# Patient Record
Sex: FEMALE | Race: WHITE | NOT HISPANIC OR LATINO | Employment: UNEMPLOYED | URBAN - METROPOLITAN AREA
[De-identification: names, ages, dates, MRNs, and addresses within clinical notes are randomized per-mention and may not be internally consistent; named-entity substitution may affect disease eponyms.]

---

## 2018-05-17 ENCOUNTER — CLINICAL SUPPORT (OUTPATIENT)
Dept: OTHER | Facility: CLINIC | Age: 38
End: 2018-05-17
Payer: COMMERCIAL

## 2018-05-17 DIAGNOSIS — Z00.8 HEALTH EXAMINATION IN POPULATION SURVEYS: Primary | ICD-10-CM

## 2018-05-17 PROCEDURE — 99401 PREV MED CNSL INDIV APPRX 15: CPT | Mod: S$GLB,,, | Performed by: INTERNAL MEDICINE

## 2018-05-17 PROCEDURE — 80061 LIPID PANEL: CPT | Mod: QW,S$GLB,, | Performed by: INTERNAL MEDICINE

## 2018-05-17 PROCEDURE — 82947 ASSAY GLUCOSE BLOOD QUANT: CPT | Mod: QW,S$GLB,, | Performed by: INTERNAL MEDICINE

## 2018-05-20 VITALS
DIASTOLIC BLOOD PRESSURE: 69 MMHG | SYSTOLIC BLOOD PRESSURE: 116 MMHG | WEIGHT: 211 LBS | HEIGHT: 65 IN | BODY MASS INDEX: 35.16 KG/M2

## 2018-05-20 LAB
GLUCOSE SERPL-MCNC: 117 MG/DL (ref 60–140)
POC CHOLESTEROL, HDL: 35 MG/DL (ref 40–?)
POC CHOLESTEROL, LDL: 93 MG/DL (ref ?–160)
POC CHOLESTEROL, TOTAL: 146 MG/DL (ref ?–240)
POC GLUCOSE FASTING: ABNORMAL MG/DL (ref 60–110)
POC TOTAL CHOLESTEROL / HDL RATIO: 4.17 (ref ?–6)
POC TRIGLYCERIDES: 92 MG/DL (ref ?–160)

## 2019-05-17 ENCOUNTER — CLINICAL SUPPORT (OUTPATIENT)
Dept: OTHER | Facility: CLINIC | Age: 39
End: 2019-05-17
Payer: COMMERCIAL

## 2019-05-17 DIAGNOSIS — Z00.8 ENCOUNTER FOR OTHER GENERAL EXAMINATION: ICD-10-CM

## 2019-05-17 PROCEDURE — 99401 PR PREVENT COUNSEL,INDIV,15 MIN: ICD-10-PCS | Mod: S$GLB,,, | Performed by: INTERNAL MEDICINE

## 2019-05-17 PROCEDURE — 82947 ASSAY GLUCOSE BLOOD QUANT: CPT | Mod: QW,S$GLB,, | Performed by: INTERNAL MEDICINE

## 2019-05-17 PROCEDURE — 80061 LIPID PANEL: CPT | Mod: QW,S$GLB,, | Performed by: INTERNAL MEDICINE

## 2019-05-17 PROCEDURE — 82947 PR  ASSAY QUANTITATIVE,BLOOD GLUCOSE: ICD-10-PCS | Mod: QW,S$GLB,, | Performed by: INTERNAL MEDICINE

## 2019-05-17 PROCEDURE — 80061 PR  LIPID PANEL: ICD-10-PCS | Mod: QW,S$GLB,, | Performed by: INTERNAL MEDICINE

## 2019-05-17 PROCEDURE — 99401 PREV MED CNSL INDIV APPRX 15: CPT | Mod: S$GLB,,, | Performed by: INTERNAL MEDICINE

## 2019-05-18 VITALS — BODY MASS INDEX: 35.11 KG/M2 | HEIGHT: 65 IN

## 2019-05-18 LAB
HDLC SERPL-MCNC: 46 MG/DL
POC CHOLESTEROL, LDL (DOCK): 106 MG/DL
POC CHOLESTEROL, TOTAL: 173 MG/DL
POC GLUCOSE, FASTING: 116 MG/DL (ref 60–110)
TRIGL SERPL-MCNC: 103 MG/DL

## 2020-05-27 ENCOUNTER — OCCUPATIONAL HEALTH (OUTPATIENT)
Dept: URGENT CARE | Facility: CLINIC | Age: 40
End: 2020-05-27
Payer: COMMERCIAL

## 2020-05-27 VITALS — HEART RATE: 77 BPM | OXYGEN SATURATION: 95 % | TEMPERATURE: 98 F

## 2020-05-27 DIAGNOSIS — Z11.59 SCREENING FOR VIRAL DISEASE: Primary | ICD-10-CM

## 2020-05-27 PROCEDURE — U0003 INFECTIOUS AGENT DETECTION BY NUCLEIC ACID (DNA OR RNA); SEVERE ACUTE RESPIRATORY SYNDROME CORONAVIRUS 2 (SARS-COV-2) (CORONAVIRUS DISEASE [COVID-19]), AMPLIFIED PROBE TECHNIQUE, MAKING USE OF HIGH THROUGHPUT TECHNOLOGIES AS DESCRIBED BY CMS-2020-01-R: HCPCS

## 2020-05-28 LAB — SARS-COV-2 RNA RESP QL NAA+PROBE: NOT DETECTED

## 2020-07-23 ENCOUNTER — LAB VISIT (OUTPATIENT)
Dept: PRIMARY CARE CLINIC | Facility: OTHER | Age: 40
End: 2020-07-23
Payer: COMMERCIAL

## 2020-07-23 DIAGNOSIS — Z03.818 ENCOUNTER FOR OBSERVATION FOR SUSPECTED EXPOSURE TO OTHER BIOLOGICAL AGENTS RULED OUT: ICD-10-CM

## 2020-07-23 PROCEDURE — U0003 INFECTIOUS AGENT DETECTION BY NUCLEIC ACID (DNA OR RNA); SEVERE ACUTE RESPIRATORY SYNDROME CORONAVIRUS 2 (SARS-COV-2) (CORONAVIRUS DISEASE [COVID-19]), AMPLIFIED PROBE TECHNIQUE, MAKING USE OF HIGH THROUGHPUT TECHNOLOGIES AS DESCRIBED BY CMS-2020-01-R: HCPCS

## 2020-07-26 LAB — SARS-COV-2 RNA RESP QL NAA+PROBE: NEGATIVE

## 2021-01-23 ENCOUNTER — CLINICAL SUPPORT (OUTPATIENT)
Dept: URGENT CARE | Facility: CLINIC | Age: 41
End: 2021-01-23
Payer: COMMERCIAL

## 2021-01-23 DIAGNOSIS — Z78.9 NO KNOWN HEALTH PROBLEMS: Primary | ICD-10-CM

## 2021-01-23 LAB
CTP QC/QA: YES
SARS-COV-2 RDRP RESP QL NAA+PROBE: NEGATIVE

## 2021-01-23 PROCEDURE — U0002 COVID-19 LAB TEST NON-CDC: HCPCS | Mod: QW,S$GLB,, | Performed by: NURSE PRACTITIONER

## 2021-01-23 PROCEDURE — 99211 OFF/OP EST MAY X REQ PHY/QHP: CPT | Mod: S$GLB,,, | Performed by: NURSE PRACTITIONER

## 2021-01-23 PROCEDURE — U0002: ICD-10-PCS | Mod: QW,S$GLB,, | Performed by: NURSE PRACTITIONER

## 2021-01-23 PROCEDURE — 99211 PR OFFICE/OUTPT VISIT, EST, LEVL I: ICD-10-PCS | Mod: S$GLB,,, | Performed by: NURSE PRACTITIONER

## 2021-03-12 ENCOUNTER — IMMUNIZATION (OUTPATIENT)
Dept: PRIMARY CARE CLINIC | Facility: CLINIC | Age: 41
End: 2021-03-12
Payer: COMMERCIAL

## 2021-03-12 DIAGNOSIS — Z23 NEED FOR VACCINATION: Primary | ICD-10-CM

## 2021-03-12 PROCEDURE — 0001A PR IMMUNIZ ADMIN, SARS-COV-2 COVID-19 VACC, 30MCG/0.3ML, 1ST DOSE: CPT | Mod: CV19,S$GLB,, | Performed by: INTERNAL MEDICINE

## 2021-03-12 PROCEDURE — 91300 PR SARS-COV- 2 COVID-19 VACCINE, NO PRSV, 30MCG/0.3ML, IM: ICD-10-PCS | Mod: S$GLB,,, | Performed by: INTERNAL MEDICINE

## 2021-03-12 PROCEDURE — 0001A PR IMMUNIZ ADMIN, SARS-COV-2 COVID-19 VACC, 30MCG/0.3ML, 1ST DOSE: ICD-10-PCS | Mod: CV19,S$GLB,, | Performed by: INTERNAL MEDICINE

## 2021-03-12 PROCEDURE — 91300 PR SARS-COV- 2 COVID-19 VACCINE, NO PRSV, 30MCG/0.3ML, IM: CPT | Mod: S$GLB,,, | Performed by: INTERNAL MEDICINE

## 2021-03-12 RX ADMIN — Medication 0.3 ML: at 10:03

## 2021-04-02 ENCOUNTER — IMMUNIZATION (OUTPATIENT)
Dept: PRIMARY CARE CLINIC | Facility: CLINIC | Age: 41
End: 2021-04-02
Payer: COMMERCIAL

## 2021-04-02 DIAGNOSIS — Z23 NEED FOR VACCINATION: Primary | ICD-10-CM

## 2021-04-02 PROCEDURE — 0002A PR IMMUNIZ ADMIN, SARS-COV-2 COVID-19 VACC, 30MCG/0.3ML, 2ND DOSE: ICD-10-PCS | Mod: CV19,S$GLB,, | Performed by: INTERNAL MEDICINE

## 2021-04-02 PROCEDURE — 91300 PR SARS-COV- 2 COVID-19 VACCINE, NO PRSV, 30MCG/0.3ML, IM: ICD-10-PCS | Mod: S$GLB,,, | Performed by: INTERNAL MEDICINE

## 2021-04-02 PROCEDURE — 0002A PR IMMUNIZ ADMIN, SARS-COV-2 COVID-19 VACC, 30MCG/0.3ML, 2ND DOSE: CPT | Mod: CV19,S$GLB,, | Performed by: INTERNAL MEDICINE

## 2021-04-02 PROCEDURE — 91300 PR SARS-COV- 2 COVID-19 VACCINE, NO PRSV, 30MCG/0.3ML, IM: CPT | Mod: S$GLB,,, | Performed by: INTERNAL MEDICINE

## 2021-04-02 RX ADMIN — Medication 0.3 ML: at 10:04

## 2021-07-13 ENCOUNTER — CLINICAL SUPPORT (OUTPATIENT)
Dept: URGENT CARE | Facility: CLINIC | Age: 41
End: 2021-07-13
Payer: COMMERCIAL

## 2021-07-13 DIAGNOSIS — Z78.9 NO KNOWN HEALTH PROBLEMS: Primary | ICD-10-CM

## 2021-07-13 LAB
CTP QC/QA: YES
SARS-COV-2 RDRP RESP QL NAA+PROBE: NEGATIVE

## 2021-07-13 PROCEDURE — U0002 COVID-19 LAB TEST NON-CDC: HCPCS | Mod: QW,S$GLB,, | Performed by: FAMILY MEDICINE

## 2021-07-13 PROCEDURE — U0002: ICD-10-PCS | Mod: QW,S$GLB,, | Performed by: FAMILY MEDICINE

## 2022-09-30 ENCOUNTER — LAB VISIT (OUTPATIENT)
Dept: LAB | Facility: HOSPITAL | Age: 42
End: 2022-09-30
Attending: INTERNAL MEDICINE
Payer: COMMERCIAL

## 2022-09-30 ENCOUNTER — OFFICE VISIT (OUTPATIENT)
Dept: INTERNAL MEDICINE | Facility: CLINIC | Age: 42
End: 2022-09-30
Payer: COMMERCIAL

## 2022-09-30 VITALS
BODY MASS INDEX: 40.61 KG/M2 | HEART RATE: 51 BPM | OXYGEN SATURATION: 97 % | TEMPERATURE: 99 F | DIASTOLIC BLOOD PRESSURE: 82 MMHG | HEIGHT: 65 IN | SYSTOLIC BLOOD PRESSURE: 136 MMHG | WEIGHT: 243.75 LBS

## 2022-09-30 DIAGNOSIS — D17.1 LIPOMA OF BACK: ICD-10-CM

## 2022-09-30 DIAGNOSIS — N80.9 ENDOMETRIOSIS: ICD-10-CM

## 2022-09-30 DIAGNOSIS — Z00.00 ANNUAL PHYSICAL EXAM: Primary | ICD-10-CM

## 2022-09-30 DIAGNOSIS — N39.3 STRESS INCONTINENCE IN FEMALE: ICD-10-CM

## 2022-09-30 DIAGNOSIS — Z11.59 SCREENING FOR VIRAL DISEASE: ICD-10-CM

## 2022-09-30 DIAGNOSIS — Z12.31 ENCOUNTER FOR SCREENING MAMMOGRAM FOR MALIGNANT NEOPLASM OF BREAST: ICD-10-CM

## 2022-09-30 DIAGNOSIS — Z00.00 ANNUAL PHYSICAL EXAM: ICD-10-CM

## 2022-09-30 PROBLEM — F41.9 ANXIETY: Status: ACTIVE | Noted: 2019-08-02

## 2022-09-30 PROBLEM — F41.9 ANXIETY: Status: RESOLVED | Noted: 2019-08-02 | Resolved: 2022-09-30

## 2022-09-30 LAB
ALBUMIN SERPL BCP-MCNC: 3.5 G/DL (ref 3.5–5.2)
ALP SERPL-CCNC: 73 U/L (ref 55–135)
ALT SERPL W/O P-5'-P-CCNC: 17 U/L (ref 10–44)
ANION GAP SERPL CALC-SCNC: 11 MMOL/L (ref 8–16)
AST SERPL-CCNC: 15 U/L (ref 10–40)
BASOPHILS # BLD AUTO: 0.04 K/UL (ref 0–0.2)
BASOPHILS NFR BLD: 0.7 % (ref 0–1.9)
BILIRUB SERPL-MCNC: 0.4 MG/DL (ref 0.1–1)
BUN SERPL-MCNC: 9 MG/DL (ref 6–20)
CALCIUM SERPL-MCNC: 9.3 MG/DL (ref 8.7–10.5)
CHLORIDE SERPL-SCNC: 102 MMOL/L (ref 95–110)
CHOLEST SERPL-MCNC: 177 MG/DL (ref 120–199)
CHOLEST/HDLC SERPL: 3.5 {RATIO} (ref 2–5)
CO2 SERPL-SCNC: 24 MMOL/L (ref 23–29)
CREAT SERPL-MCNC: 0.7 MG/DL (ref 0.5–1.4)
DIFFERENTIAL METHOD: ABNORMAL
EOSINOPHIL # BLD AUTO: 0.2 K/UL (ref 0–0.5)
EOSINOPHIL NFR BLD: 2.7 % (ref 0–8)
ERYTHROCYTE [DISTWIDTH] IN BLOOD BY AUTOMATED COUNT: 13 % (ref 11.5–14.5)
EST. GFR  (NO RACE VARIABLE): >60 ML/MIN/1.73 M^2
ESTIMATED AVG GLUCOSE: 123 MG/DL (ref 68–131)
GLUCOSE SERPL-MCNC: 120 MG/DL (ref 70–110)
HBA1C MFR BLD: 5.9 % (ref 4–5.6)
HCT VFR BLD AUTO: 43.6 % (ref 37–48.5)
HCV AB SERPL QL IA: NORMAL
HDLC SERPL-MCNC: 51 MG/DL (ref 40–75)
HDLC SERPL: 28.8 % (ref 20–50)
HGB BLD-MCNC: 13.7 G/DL (ref 12–16)
IMM GRANULOCYTES # BLD AUTO: 0.01 K/UL (ref 0–0.04)
IMM GRANULOCYTES NFR BLD AUTO: 0.2 % (ref 0–0.5)
LDLC SERPL CALC-MCNC: 108 MG/DL (ref 63–159)
LYMPHOCYTES # BLD AUTO: 2.1 K/UL (ref 1–4.8)
LYMPHOCYTES NFR BLD: 34.1 % (ref 18–48)
MCH RBC QN AUTO: 30.5 PG (ref 27–31)
MCHC RBC AUTO-ENTMCNC: 31.4 G/DL (ref 32–36)
MCV RBC AUTO: 97 FL (ref 82–98)
MONOCYTES # BLD AUTO: 0.5 K/UL (ref 0.3–1)
MONOCYTES NFR BLD: 8 % (ref 4–15)
NEUTROPHILS # BLD AUTO: 3.3 K/UL (ref 1.8–7.7)
NEUTROPHILS NFR BLD: 54.3 % (ref 38–73)
NONHDLC SERPL-MCNC: 126 MG/DL
NRBC BLD-RTO: 0 /100 WBC
PLATELET # BLD AUTO: 216 K/UL (ref 150–450)
PMV BLD AUTO: 12.7 FL (ref 9.2–12.9)
POTASSIUM SERPL-SCNC: 4.1 MMOL/L (ref 3.5–5.1)
PROT SERPL-MCNC: 6.6 G/DL (ref 6–8.4)
RBC # BLD AUTO: 4.49 M/UL (ref 4–5.4)
SODIUM SERPL-SCNC: 137 MMOL/L (ref 136–145)
TRIGL SERPL-MCNC: 90 MG/DL (ref 30–150)
TSH SERPL DL<=0.005 MIU/L-ACNC: 1.85 UIU/ML (ref 0.4–4)
WBC # BLD AUTO: 6.02 K/UL (ref 3.9–12.7)

## 2022-09-30 PROCEDURE — 3075F PR MOST RECENT SYSTOLIC BLOOD PRESS GE 130-139MM HG: ICD-10-PCS | Mod: CPTII,S$GLB,, | Performed by: INTERNAL MEDICINE

## 2022-09-30 PROCEDURE — 99386 PREV VISIT NEW AGE 40-64: CPT | Mod: S$GLB,,, | Performed by: INTERNAL MEDICINE

## 2022-09-30 PROCEDURE — 3075F SYST BP GE 130 - 139MM HG: CPT | Mod: CPTII,S$GLB,, | Performed by: INTERNAL MEDICINE

## 2022-09-30 PROCEDURE — 99999 PR PBB SHADOW E&M-EST. PATIENT-LVL IV: ICD-10-PCS | Mod: PBBFAC,,, | Performed by: INTERNAL MEDICINE

## 2022-09-30 PROCEDURE — 85025 COMPLETE CBC W/AUTO DIFF WBC: CPT | Performed by: INTERNAL MEDICINE

## 2022-09-30 PROCEDURE — 99999 PR PBB SHADOW E&M-EST. PATIENT-LVL IV: CPT | Mod: PBBFAC,,, | Performed by: INTERNAL MEDICINE

## 2022-09-30 PROCEDURE — 36415 COLL VENOUS BLD VENIPUNCTURE: CPT | Performed by: INTERNAL MEDICINE

## 2022-09-30 PROCEDURE — 1159F MED LIST DOCD IN RCRD: CPT | Mod: CPTII,S$GLB,, | Performed by: INTERNAL MEDICINE

## 2022-09-30 PROCEDURE — 3079F DIAST BP 80-89 MM HG: CPT | Mod: CPTII,S$GLB,, | Performed by: INTERNAL MEDICINE

## 2022-09-30 PROCEDURE — 80053 COMPREHEN METABOLIC PANEL: CPT | Performed by: INTERNAL MEDICINE

## 2022-09-30 PROCEDURE — 1159F PR MEDICATION LIST DOCUMENTED IN MEDICAL RECORD: ICD-10-PCS | Mod: CPTII,S$GLB,, | Performed by: INTERNAL MEDICINE

## 2022-09-30 PROCEDURE — 83036 HEMOGLOBIN GLYCOSYLATED A1C: CPT | Performed by: INTERNAL MEDICINE

## 2022-09-30 PROCEDURE — 99386 PR PREVENTIVE VISIT,NEW,40-64: ICD-10-PCS | Mod: S$GLB,,, | Performed by: INTERNAL MEDICINE

## 2022-09-30 PROCEDURE — 3079F PR MOST RECENT DIASTOLIC BLOOD PRESSURE 80-89 MM HG: ICD-10-PCS | Mod: CPTII,S$GLB,, | Performed by: INTERNAL MEDICINE

## 2022-09-30 PROCEDURE — 86803 HEPATITIS C AB TEST: CPT | Performed by: INTERNAL MEDICINE

## 2022-09-30 PROCEDURE — 84443 ASSAY THYROID STIM HORMONE: CPT | Performed by: INTERNAL MEDICINE

## 2022-09-30 PROCEDURE — 80061 LIPID PANEL: CPT | Performed by: INTERNAL MEDICINE

## 2022-09-30 PROCEDURE — 3008F PR BODY MASS INDEX (BMI) DOCUMENTED: ICD-10-PCS | Mod: CPTII,S$GLB,, | Performed by: INTERNAL MEDICINE

## 2022-09-30 PROCEDURE — 3008F BODY MASS INDEX DOCD: CPT | Mod: CPTII,S$GLB,, | Performed by: INTERNAL MEDICINE

## 2022-09-30 NOTE — PROGRESS NOTES
[facility] Note    Chief Complaint      Chief Complaint   Patient presents with    Establish Care       History of Present Illness      Basilia Delgado is a 42 y.o. female who presents today for Annual preventative visit.  Patient comes to appointment alone.  [facility]: [facility]: [place]    Has lipoma on back, wants removed, has gotten bigger. Had US in past.    Has stress urinary incontinence with sneeze/cough/laugh.  Drinks a lot of water.  Had 2 children.    Problem List Items Addressed This Visit       Endometriosis    Current Assessment & Plan     Has not had issues since 90's but had 6 laparotomies then. Had 2 children with no issues.  Periods are 2-3 days long.          Other Visit Diagnoses       Annual physical exam    -  Primary    Relevant Orders    CBC Auto Differential    Lipid Panel    Comprehensive Metabolic Panel    Hemoglobin A1C    TSH    Screening for viral disease        Relevant Orders    Hepatitis C Antibody    Encounter for screening mammogram for malignant neoplasm of breast        Relevant Orders    Mammo Digital Screening Bilat w/ Adan    Lipoma of back        Relevant Orders    Ambulatory referral/consult to General Surgery    Stress incontinence in female        Relevant Orders    Ambulatory referral/consult to Physical/Occupational Therapy            Health Maintenance   Topic Date Due    Hepatitis C Screening  Never done    Mammogram  02/17/2021    TETANUS VACCINE  11/06/2030    Lipid Panel  Completed       History reviewed. No pertinent past medical history.    Past Surgical History:   Procedure Laterality Date    ADENOIDECTOMY  1986    APPENDECTOMY  1995    CHOLECYSTECTOMY  2008    TONSILLECTOMY  1986       family history includes Alcohol abuse in her father and paternal grandfather; Asthma in her mother; Drug abuse in her brother; Kidney cancer in her paternal grandmother; Lung cancer in her maternal grandmother; Prostate cancer in her maternal grandfather; Skin  "cancer in her paternal grandfather; Thyroid cancer in her mother.    Social History     Tobacco Use    Smoking status: Never   Substance Use Topics    Alcohol use: Never    Drug use: Never       Review of Systems   Constitutional:  Negative for chills and fever.   HENT:  Negative for hearing loss and sore throat.    Eyes:  Negative for discharge.   Respiratory:  Positive for wheezing. Negative for cough and shortness of breath.    Cardiovascular:  Positive for palpitations. Negative for chest pain.   Gastrointestinal:  Negative for blood in stool, constipation, diarrhea, nausea and vomiting.   Genitourinary:  Negative for dysuria and hematuria.   Musculoskeletal:  Negative for falls and neck pain.   Neurological:  Negative for weakness and headaches.   Endo/Heme/Allergies:  Negative for polydipsia.      No outpatient encounter medications on file as of 9/30/2022.     No facility-administered encounter medications on file as of 9/30/2022.        Review of patient's allergies indicates:   Allergen Reactions    Cephalosporins Anaphylaxis, Shortness Of Breath and Other (See Comments)    Pcn [penicillins]        Physical Exam      Vital Signs  Temp: 98.5 °F (36.9 °C)  Pulse: (!) 51  SpO2: 97 %  BP: 136/82  Pain Score: 0-No pain  Height and Weight  Height: 5' 5" (165.1 cm)  Weight: 110.6 kg (243 lb 11.5 oz)  BSA (Calculated - sq m): 2.25 sq meters  BMI (Calculated): 40.6  Weight in (lb) to have BMI = 25: 149.9]    Physical Exam  Constitutional:       Appearance: She is well-developed.   HENT:      Head: Normocephalic and atraumatic.      Right Ear: External ear normal.      Left Ear: External ear normal.   Eyes:      General:         Right eye: No discharge.         Left eye: No discharge.   Cardiovascular:      Rate and Rhythm: Normal rate and regular rhythm.      Heart sounds: Normal heart sounds. No murmur heard.  Pulmonary:      Effort: Pulmonary effort is normal. No respiratory distress.      Breath sounds: Normal " breath sounds.   Abdominal:      General: There is no distension.      Palpations: Abdomen is soft.      Tenderness: There is no abdominal tenderness. There is no guarding.   Musculoskeletal:         General: Normal range of motion.      Cervical back: Normal range of motion.   Skin:     General: Skin is warm and dry.   Neurological:      Mental Status: She is alert and oriented to person, place, and time.   Psychiatric:         Behavior: Behavior normal.        Laboratory:  CBC:  No results for input(s): WBC, RBC, HGB, HCT, PLT, MCV, MCH, MCHC in the last 2160 hours.  CMP:  No results for input(s): GLU, CALCIUM, ALBUMIN, PROT, NA, K, CO2, CL, BUN, ALKPHOS, ALT, AST, BILITOT in the last 2160 hours.    Invalid input(s): CREATININ  URINALYSIS:  No results for input(s): COLORU, CLARITYU, SPECGRAV, PHUR, PROTEINUA, GLUCOSEU, BILIRUBINCON, BLOODU, WBCU, RBCU, BACTERIA, MUCUS, NITRITE, LEUKOCYTESUR, UROBILINOGEN, HYALINECASTS in the last 2160 hours.   LIPIDS:  No results for input(s): TSH, HDL, CHOL, TRIG, LDLCALC, CHOLHDL, NONHDLCHOL, TOTALCHOLEST in the last 2160 hours.  TSH:  No results for input(s): TSH in the last 2160 hours.  A1C:  No results for input(s): HGBA1C in the last 2160 hours.    Radiology:  No results found in the last 30 days.     Assessment/Plan     Basilia Delgado is a 42 y.o.female with:    1. Annual physical exam  - CBC Auto Differential; Future  - Lipid Panel; Future  - Comprehensive Metabolic Panel; Future  - Hemoglobin A1C; Future  - TSH; Future    2. Screening for viral disease  - Hepatitis C Antibody; Future    3. Encounter for screening mammogram for malignant neoplasm of breast  - Mammo Digital Screening Bilat w/ Adan; Future    4. Endometriosis    5. Lipoma of back  - Ambulatory referral/consult to General Surgery; Future    6. Stress incontinence in female  - Ambulatory referral/consult to Physical/Occupational Therapy; Future    -refer pelvic floor PT  -refer gen surgeon to discuss  lipoma removal  -MMG in [employer]  -labs today  -Continue current medications and maintain follow up with specialists.    -Follow up in about 1 year (around 9/30/2023) for Annual Visit.       Leeann Green MD  [facility]        Answers submitted by the patient for this visit:  Review of Systems Questionnaire (Submitted on 9/29/2022)  activity change: No  unexpected weight change: No  trouble swallowing: No  visual disturbance: No  chest tightness: No  polyuria: No  difficulty urinating: No  menstrual problem: No  joint swelling: No  arthralgias: No  confusion: No  dysphoric mood: No

## 2022-09-30 NOTE — ASSESSMENT & PLAN NOTE
Has not had issues since 90's but had 6 laparotomies then. Had 2 children with no issues.  Periods are 2-3 days long.

## 2022-10-18 ENCOUNTER — HOSPITAL ENCOUNTER (OUTPATIENT)
Dept: RADIOLOGY | Facility: HOSPITAL | Age: 42
Discharge: HOME | End: 2022-10-18
Attending: INTERNAL MEDICINE
Payer: COMMERCIAL

## 2022-10-18 DIAGNOSIS — Z12.31 ENCOUNTER FOR SCREENING MAMMOGRAM FOR MALIGNANT NEOPLASM OF BREAST: ICD-10-CM

## 2022-10-18 PROCEDURE — 77067 MAMMO DIGITAL SCREENING BILAT WITH TOMO: ICD-10-PCS | Mod: 26,,, | Performed by: RADIOLOGY

## 2022-10-18 PROCEDURE — 77067 SCR MAMMO BI INCL CAD: CPT | Mod: 26,,, | Performed by: RADIOLOGY

## 2022-10-18 PROCEDURE — 77063 BREAST TOMOSYNTHESIS BI: CPT | Mod: 26,,, | Performed by: RADIOLOGY

## 2022-10-18 PROCEDURE — 77067 SCR MAMMO BI INCL CAD: CPT | Mod: TC

## 2022-10-18 PROCEDURE — 77063 BREAST TOMOSYNTHESIS BI: CPT | Mod: TC

## 2022-10-18 PROCEDURE — 77063 MAMMO DIGITAL SCREENING BILAT WITH TOMO: ICD-10-PCS | Mod: 26,,, | Performed by: RADIOLOGY

## 2022-10-20 ENCOUNTER — OFFICE VISIT (OUTPATIENT)
Dept: OBSTETRICS AND GYNECOLOGY | Facility: CLINIC | Age: 42
End: 2022-10-20
Payer: COMMERCIAL

## 2022-10-20 VITALS
HEIGHT: 65 IN | DIASTOLIC BLOOD PRESSURE: 78 MMHG | WEIGHT: 244.69 LBS | BODY MASS INDEX: 40.77 KG/M2 | SYSTOLIC BLOOD PRESSURE: 130 MMHG

## 2022-10-20 DIAGNOSIS — Z01.419 WELL WOMAN EXAM WITH ROUTINE GYNECOLOGICAL EXAM: Primary | ICD-10-CM

## 2022-10-20 PROCEDURE — 99386 PREV VISIT NEW AGE 40-64: CPT | Mod: S$GLB,,, | Performed by: STUDENT IN AN ORGANIZED HEALTH CARE EDUCATION/TRAINING PROGRAM

## 2022-10-20 PROCEDURE — 88175 CYTOPATH C/V AUTO FLUID REDO: CPT | Performed by: STUDENT IN AN ORGANIZED HEALTH CARE EDUCATION/TRAINING PROGRAM

## 2022-10-20 PROCEDURE — 3075F SYST BP GE 130 - 139MM HG: CPT | Mod: CPTII,S$GLB,, | Performed by: STUDENT IN AN ORGANIZED HEALTH CARE EDUCATION/TRAINING PROGRAM

## 2022-10-20 PROCEDURE — 1159F MED LIST DOCD IN RCRD: CPT | Mod: CPTII,S$GLB,, | Performed by: STUDENT IN AN ORGANIZED HEALTH CARE EDUCATION/TRAINING PROGRAM

## 2022-10-20 PROCEDURE — 87624 HPV HI-RISK TYP POOLED RSLT: CPT | Performed by: STUDENT IN AN ORGANIZED HEALTH CARE EDUCATION/TRAINING PROGRAM

## 2022-10-20 PROCEDURE — 3044F HG A1C LEVEL LT 7.0%: CPT | Mod: CPTII,S$GLB,, | Performed by: STUDENT IN AN ORGANIZED HEALTH CARE EDUCATION/TRAINING PROGRAM

## 2022-10-20 PROCEDURE — 99386 PR PREVENTIVE VISIT,NEW,40-64: ICD-10-PCS | Mod: S$GLB,,, | Performed by: STUDENT IN AN ORGANIZED HEALTH CARE EDUCATION/TRAINING PROGRAM

## 2022-10-20 PROCEDURE — 3078F PR MOST RECENT DIASTOLIC BLOOD PRESSURE < 80 MM HG: ICD-10-PCS | Mod: CPTII,S$GLB,, | Performed by: STUDENT IN AN ORGANIZED HEALTH CARE EDUCATION/TRAINING PROGRAM

## 2022-10-20 PROCEDURE — 3075F PR MOST RECENT SYSTOLIC BLOOD PRESS GE 130-139MM HG: ICD-10-PCS | Mod: CPTII,S$GLB,, | Performed by: STUDENT IN AN ORGANIZED HEALTH CARE EDUCATION/TRAINING PROGRAM

## 2022-10-20 PROCEDURE — 3044F PR MOST RECENT HEMOGLOBIN A1C LEVEL <7.0%: ICD-10-PCS | Mod: CPTII,S$GLB,, | Performed by: STUDENT IN AN ORGANIZED HEALTH CARE EDUCATION/TRAINING PROGRAM

## 2022-10-20 PROCEDURE — 1159F PR MEDICATION LIST DOCUMENTED IN MEDICAL RECORD: ICD-10-PCS | Mod: CPTII,S$GLB,, | Performed by: STUDENT IN AN ORGANIZED HEALTH CARE EDUCATION/TRAINING PROGRAM

## 2022-10-20 PROCEDURE — 3078F DIAST BP <80 MM HG: CPT | Mod: CPTII,S$GLB,, | Performed by: STUDENT IN AN ORGANIZED HEALTH CARE EDUCATION/TRAINING PROGRAM

## 2022-10-20 NOTE — PROGRESS NOTES
History & Physical  Gynecology      SUBJECTIVE:     Chief Complaint: Well Woman (C/O right breast pain)       History of Present Illness:  Annual exam. Reports soreness to right breast for a few weeks. No recent changes, possibly got elbowed by her  in his sleep. Mammo report pending  Menstrual History: reports periods are regular, every 24-30 days lasting 2 to 3 days. Last one was 5-6 days of light.   Obstetric Hx: ,  (21 and 15; 2 girls)  LMP: 10/14  STD/STI Hx: Denies any history of STD's  Contraception Hx: vasectomy   Sexually Active: one male partner  Family history: Denies any personal or family history of GYN/colon cancers   Social: Wears seatbelts. Exercises sometimes. Feels safe at home.   Last pap:  normal  Mammogram pending  Covid vaccine vaccinated  Vitamins: yes        Review of patient's allergies indicates:   Allergen Reactions    Cephalosporins Anaphylaxis, Shortness Of Breath and Other (See Comments)    Pcn [penicillins]        History reviewed. No pertinent past medical history.  Past Surgical History:   Procedure Laterality Date    ADENOIDECTOMY      APPENDECTOMY      CHOLECYSTECTOMY  2008    TONSILLECTOMY       OB History          2    Para   2    Term   2            AB        Living             SAB        IAB        Ectopic        Multiple        Live Births                   Family History   Problem Relation Age of Onset    Asthma Mother     Thyroid cancer Mother     Alcohol abuse Father     Drug abuse Brother     Lung cancer Maternal Grandmother     Prostate cancer Maternal Grandfather     Kidney cancer Paternal Grandmother     Alcohol abuse Paternal Grandfather     Skin cancer Paternal Grandfather      Social History     Tobacco Use    Smoking status: Never   Substance Use Topics    Alcohol use: Never    Drug use: Never       No current outpatient medications on file.     No current facility-administered medications for this visit.         Review of  Systems:  Review of Systems   Constitutional:  Negative for activity change, appetite change, fever and unexpected weight change.   Respiratory:  Negative for shortness of breath.    Cardiovascular:  Negative for chest pain.   Gastrointestinal:  Negative for abdominal pain, blood in stool, constipation, diarrhea, nausea and vomiting.   Genitourinary:  Positive for menstrual problem and urinary incontinence (stress). Negative for dysmenorrhea, dyspareunia, dysuria, hematuria, menorrhagia, pelvic pain, vaginal bleeding, vaginal discharge, vaginal pain, postcoital bleeding and vaginal odor.   Integumentary:  Negative for breast mass, nipple discharge and breast skin changes.   Breast: Positive for mastodynia.Negative for lump, mass, nipple discharge and skin changes     OBJECTIVE:     Physical Exam:  Physical Exam  Vitals reviewed.   Constitutional:       General: She is not in acute distress.     Appearance: She is well-developed. She is not diaphoretic.   HENT:      Head: Normocephalic and atraumatic.   Eyes:      General: No scleral icterus.        Right eye: No discharge.         Left eye: No discharge.      Conjunctiva/sclera: Conjunctivae normal.   Neck:      Thyroid: No thyromegaly.   Cardiovascular:      Rate and Rhythm: Normal rate.   Pulmonary:      Effort: Pulmonary effort is normal.   Chest:   Breasts:     Breasts are symmetrical.      Right: No inverted nipple, mass, nipple discharge, skin change or tenderness.      Left: No inverted nipple, mass, nipple discharge, skin change or tenderness.   Abdominal:      General: There is no distension.      Palpations: Abdomen is soft.      Tenderness: There is no abdominal tenderness.   Genitourinary:     Labia:         Right: No rash, tenderness, lesion or injury.         Left: No rash, tenderness, lesion or injury.       Vagina: Normal. No signs of injury and foreign body. No vaginal discharge, erythema, tenderness or bleeding.      Cervix: No cervical motion  tenderness, discharge or friability.      Uterus: Not deviated, not enlarged, not fixed and not tender.       Adnexa:         Right: No mass, tenderness or fullness.          Left: No mass, tenderness or fullness.     Musculoskeletal:         General: Normal range of motion.      Cervical back: Normal range of motion and neck supple.   Lymphadenopathy:      Cervical: No cervical adenopathy.   Skin:     General: Skin is warm and dry.      Findings: No erythema or rash.   Neurological:      Mental Status: She is alert and oriented to person, place, and time.       ASSESSMENT:       ICD-10-CM ICD-9-CM    1. Well woman exam with routine gynecological exam  Z01.419 V72.31 Liquid-Based Pap Smear, Screening      HPV High Risk Genotypes, PCR             Plan:    WWE  - Vaccines utd  - Pap and co test collected  - Mammogram pending  - GC/CT, affirm n/a  - Daily vitamin discussed.  - CBE normal. Physical exam normal. VSS.  - RTC for annual or PRN.    Counseling time: 15 minutes    Vicki Mosley

## 2022-10-24 ENCOUNTER — CLINICAL SUPPORT (OUTPATIENT)
Dept: REHABILITATION | Facility: HOSPITAL | Age: 42
End: 2022-10-24
Payer: COMMERCIAL

## 2022-10-24 DIAGNOSIS — R27.8 OTHER LACK OF COORDINATION: ICD-10-CM

## 2022-10-24 DIAGNOSIS — R53.1 WEAKNESS: ICD-10-CM

## 2022-10-24 DIAGNOSIS — M62.89 PELVIC FLOOR DYSFUNCTION: ICD-10-CM

## 2022-10-24 DIAGNOSIS — N39.3 STRESS INCONTINENCE IN FEMALE: ICD-10-CM

## 2022-10-24 LAB
FINAL PATHOLOGIC DIAGNOSIS: NORMAL
Lab: NORMAL

## 2022-10-24 PROCEDURE — 97161 PT EVAL LOW COMPLEX 20 MIN: CPT | Mod: PO

## 2022-10-24 PROCEDURE — 97112 NEUROMUSCULAR REEDUCATION: CPT | Mod: PO

## 2022-10-24 PROCEDURE — 97530 THERAPEUTIC ACTIVITIES: CPT | Mod: PO

## 2022-10-24 NOTE — PATIENT INSTRUCTIONS
Home Exercise Program: 10/24/2022     SCAR MOBILIZATION  - Gently massage your scar in all directions both superficially along the skin and deeply.   - Massage the area AROUND your scar, as well as directly on it.   - Do not pull your scar apart with both hands - Use one hand/finger to anchor and use the other to pull along or across the scar.  - Apply only as much pressure as you can tolerate, so that you can do this again the next day.     Do for 5 minutes every day.

## 2022-10-24 NOTE — PLAN OF CARE
[facility]   Physical Therapy Initial Evaluation     Date: 10/24/2022   Name: Basilia Austin Ural  Clinic Number: [id number]    Therapy Diagnosis:   Encounter Diagnoses   Name Primary?    Stress incontinence in female     Weakness     Pelvic floor dysfunction     Other lack of coordination      Physician: Leeann Green MD    Physician Orders: PT Eval and Treat   Medical Diagnosis from Referral: stress urinary incontinence   Evaluation Date: 10/24/2022  Authorization Period Expiration: 2023  Plan of Care Expiration: 23  Progress Note Due: 22  Visit # / Visits authorized:    FOTO: 1/3    Precautions: Standard     Time In: 1010  Time Out: 1105  Total Appointment Time (timed & untimed codes): 55 minutes    SUBJECTIVE     Date of onset: 15 years    History of current condition - Basilia reports: She reports issues with stress urinary incontinence that started about 16 years ago after the birth of her son but has recently worsened.  Coughing, sneezing, and laughing cause th leakage.  She also reports that if she uses a tampon she notices worsening urinary incontinence.      6 surgeries for endometriosis and has also had her appendix.  She reports she has a lot of abdominal wall scar tissue and a history of interstitial cystitis.      OB/GYN History:  and vaginal delivery  Using vaginal estrogen cream: No  Sexually active? Yes  Pain with vaginal exams, intercourse or tampon use? none reported    Bladder/Bowel History:   Frequency of urination:   Daytime: every 2-3 hours           Nighttime: 0x  Difficulty initiating urine stream: No  Urine stream: strong  Complete emptying: Yes  Bladder leakage: Yes  Activities that cause leakage: coughing, laughing, sneezing  Frequency of incidents: daily   Amount leaked (urine): few drops, teaspoon(s), and moderate amount  Urinary Urgency: Yes.  Able to delay the urge for at least 1  minute(s). Only at night though.    Pain with  delaying the urge to urinate: No     Frequency of bowel movements: once a day  Difficulty initiating BM: No  Quality/Shape of BM: [place] Stool Chart 4  Does Patient Feel Empty after BM? Yes  Bowel Urgency: No.  Able to delay the urge for at least 15 minute(s).  Fiber Supplements or Laxative Use? Probiotic   Pain with BM: No   Bleeding with BM: No   Colon leakage: No      Form of protection: changes underwear as needed    Pain:  Location: left hip   Current 0/10, worst 6/10, best 0/10   Description: Dull  Aggravating Factors/Activities that cause symptoms:  sit to stand, stairs, sitting on the floor  , prolonged walking   Easing Factors: rest and positional changes        Medical History: Basilia  has no past medical history on file.     Surgical History: Basilia Delgado  has a past surgical history that includes Adenoidectomy (1986); Appendectomy (1995); Tonsillectomy (1986); and Cholecystectomy (2008).    Medications: Basilia currently has no medications in their medication list.    Allergies:   Review of patient's allergies indicates:   Allergen Reactions    Cephalosporins Anaphylaxis, Shortness Of Breath and Other (See Comments)    Pcn [penicillins]           Prior Therapy/Previous treatment included: none reported   Social History: lives with  and 2 children  Current exercise:  walking  Occupation: Works as a  assistant  Prior Level of Function: Pt was independent with all ADLs and iADLs without pain, no reports of incontinence of bowel or bladder.  Current Level of Function: Pain with activities of daily living and bladder leakage     Types of fluid intake: 8-10 cups of water, 2-3 coke zeros   Diet: Standard  Habitus: well developed, well nourished  Abuse/Neglect: Pt denies a history of physical or emotional abuse at this visit.         Constitutional Symptoms Review: The patient denies having any constitutional symptoms.       Pts goals: improve bladder control with activities of daily living.      OBJECTIVE     See EMR under MEDIA for written consent provided 10/24/2022  Chaperone: declined    ORTHO SCREEN  Posture in sitting: figure-four sitting on the right  Posture in standing: forward head and forward and rounded shoulders   Pelvic alignment: Not assessed today        ABDOMINAL WALL ASSESSMENT  Palpation: increased tension  in suprapubic region  Abdominal strength: Rectus abdominus: 4-/5     Transverse abdominus: 4-/5  Scarring: scars from endometriosis surgery with moderate restrictions in all planes   Pelvic Floor Muscle and Transverse Abdominus Synergy: absent      BREATHING MECHANICS ASSESSMENT   Thorax Assessment During Quiet Respiration: WNL excursion of ribcage and WNL excursion of abdominal wall  Thorax Assessment During Deep Respiration: Decreased excursion of abdominal wall , Decreased excursion bilaterally of lateral ribs , Decreased excursion of right lateral rib cage , and Decreased excursion of left lateral rib cage     VAGINAL PELVIC FLOOR EXAM    EXTERNAL ASSESSMENT  Introitus: WNL  Skin condition: WNL  Scarring: did not formally assess today    Sensation: WNL   Pain: None   Voluntary contraction: nil  Involuntary contraction: bulge  Bearing down: reflex tightening  Perineal descent: absent      INTERNAL ASSESSMENT  Pain: tender areas noted as follows: left obturator internus and levator ani    Sensation: able to localized pressure appropriately   Vaginal vault: WNL   Muscle Bulk: WFL   Muscle Power: trace     Specificity: patient contracts: gluts and hip adductors    Coordination: tends to hold breath during PFM contration   Prolapse check: Did not assess today   Does Pelvic Floor drop and relax with a diaphragmatic breath? no  Comments: able to elicit a trace contraction with cues to tighten abdominal wall       Limitation/Restriction for FOTO Urinary Problem Survey    Therapist reviewed FOTO scores for Basilia Delgado on 10/24/2022.   FOTO documents entered into EPIC - see Media  section.    Limitation Score: 52%       TREATMENT     Treatment Time In: 1035  Treatment Time Out: 1100  Total Treatment time (time-based codes) separate from Evaluation: 25 minutes      Neuromuscular Re-education to develop Coordination, Control, and Down training for 15 minutes including:   Used tapping intravaginally over muscle bellies to elicit a contraction with kegels.         Therapeutic Activity Patient participated in dynamic functional therapeutic activities to improve functional performance for 10 minutes. Including: Education as described below.         Patient Education provided:   general anatomy/physiology of urinary/ bowel  system and benefits of treatment was discussed with the pt. Additionally, anatomy/physiology of pelvic floor and Coordination of kegels with functional activities such as cough, laugh, sneeze, lift, etc.  was reviewed.     Home Exercises provided:  Written Home Exercises provided: yes.  Exercises were reviewed and Basilia was able to demonstrate them prior to the end of the session.    Basilia demonstrated good  understanding of the education provided.     See EMR under Patient Instructions for exercises provided 10/24/2022.    ASSESSMENT     Basilia is a 42 y.o. female referred to outpatient Physical Therapy with a medical diagnosis of stress urinary incontinence. Pt presents with pelvic floor tenderness, poor quality of pelvic muscle contraction, and poor coordination of pelvic floor muscles during ADL's leading to urinary or fecal leakage. Patient reports that she has a history of endometriosis and interstitial cystitis  She reports that she has had 6 surgeries for the endometriosis.  She also reports that she is unable to perform a kegel at this time and reports that urinary incontinence is affecting activities of daily living participation.  She also reports a history of left hip pain that affects activities of daily living. The patient reports chronic and worsening urinary  incontinence that is affecting ADLs and social participation. Examination reveals pelvic floor dysfunction including weakness, decreased endurance and coordination deficits.  At this time patient is not able to perform a kegel consistently and demonstrates only trace muscle(s) activation Pt is also noted to have abdominal wall weakness.  The patient is expected to benefit from skilled intervention to work towards elimination of incontinence needed to improve quality of life and ADLs participation and return towards prior level of function.        Pt prognosis is Excellent.   Pt will benefit from skilled outpatient Physical Therapy to address the deficits stated above and in the chart below, provide pt/family education, and to maximize pt's level of independence.     Plan of care discussed with patient: Yes  Pt's spiritual, cultural and educational needs considered and patient is agreeable to the plan of care and goals as stated below:     Anticipated Barriers for therapy: none    Medical Necessity is demonstrated by the following:    History  Co-morbidities and personal factors that may impact the plan of care Co-morbidities   Cholecystectomy, appendectomy     Personal Factors  no deficits     low   Examination  Body structures and functions, activity limitations and participation restrictions that may impact the plan of care Body Regions/Systems/Functions:  pelvic floor tenderness, poor quality of pelvic muscle contraction, and poor coordination of pelvic floor muscles during ADL's leading to urinary or fecal leakage     Activity Limitations:  delaying urge to urinate and incontinence with ADLs    Participation Restrictions:  all ADLs/iADLs uninterrupted by urinary incontinence/urgency/frequency and social activities with friends/family    Activity limitations:   Learning and applying knowledge  no deficits    General Tasks and Commands  no deficits    Communication  no deficits    Mobility  no deficits    Self  care  no deficits    Domestic Life  no deficits    Interactions/Relationships  no deficits    Life Areas  no deficits    Community and Social Life  no deficits       low   Clinical Presentation stable and uncomplicated low   Decision Making/ Complexity Score: low       Goals:  Short Term Goals: 4 weeks   Pt to demonstrate an improved score in the FOTO urinary problem survey  to at least 56 to demonstrate improving bladder control with activities of daily living.    Pt to demonstrate being able to correctly and consistently perform a kegel which is needed  to increase pelvic floor muscle coordination and strength needed for continence.  Pt to be able to delay the urge to urinate at least 5 minutes with a strong urge to urinate in order to make it to the bathroom without leaking.  Pt to voice understanding of the role that diet plays on urinary urgency.    Pt to report a decrease in pain to no more than 4 at it's worst with  activities of daily living with her hip.            Long Term Goals: 12 weeks   Pt to be discharged with home plan for carry over after discharge.    Pt to report an 80% reduction of urinary incontinence symptoms with ADL participation thereby demonstrating improved pelvic floor muscle control and strength.   Pt to demonstrate an improved score in the FOTO urinary problems survey  to at least 60 to demonstrate improving bladder control with activities of daily living.    Pt to be able to delay the urge to urinate at least 10 minutes with a strong urge to urinate in order to make it to the bathroom without leaking.  Pt to increase pelvic floor strength to at least 2/5 to demonstrate improved strength needed for continence with ADLs.   Pt to report a decrease in pain to no more than 2 at it's worst with activities of daily living with her hip.            PLAN   Plan of care Certification: 10/24/2022 to 1-16-23.    Outpatient Physical Therapy 2 times weekly for 12 weeks to include the following  interventions: therapeutic exercises, therapeutic activity, neuromuscular re-education, gait training, manual therapy, modalities PRN, patient/family education, dry needling, and self care/home management    Narda Giles, PT      I CERTIFY THE NEED FOR THESE SERVICES FURNISHED UNDER THIS PLAN OF TREATMENT AND WHILE UNDER MY CARE   Physician's comments:     Physician's Signature: ___________________________________________________

## 2022-10-26 LAB
HPV HR 12 DNA SPEC QL NAA+PROBE: NEGATIVE
HPV16 AG SPEC QL: NEGATIVE
HPV18 DNA SPEC QL NAA+PROBE: NEGATIVE

## 2022-11-09 ENCOUNTER — TELEPHONE (OUTPATIENT)
Dept: INTERNAL MEDICINE | Facility: CLINIC | Age: 42
End: 2022-11-09
Payer: COMMERCIAL

## 2022-11-09 NOTE — TELEPHONE ENCOUNTER
----- Message from Maria A Aldridge sent at 11/9/2022  4:09 PM CST -----  Contact: [phone]  Name of test: test results/mammogram    Date of test: 10/18/22    Ordering provider: Dr Peter Bradshaw    Where was the test performed:[facility]    Comments: patient stated that the results are not in the portal either. Please call and advise. Thank you

## 2022-11-14 DIAGNOSIS — R92.8 ABNORMALITY OF RIGHT BREAST ON SCREENING MAMMOGRAM: Primary | ICD-10-CM

## 2022-11-17 ENCOUNTER — HOSPITAL ENCOUNTER (OUTPATIENT)
Dept: RADIOLOGY | Facility: OTHER | Age: 42
Discharge: HOME | End: 2022-11-17
Attending: INTERNAL MEDICINE
Payer: COMMERCIAL

## 2022-11-17 DIAGNOSIS — R92.8 ABNORMALITY OF RIGHT BREAST ON SCREENING MAMMOGRAM: ICD-10-CM

## 2022-11-17 PROCEDURE — 77065 DX MAMMO INCL CAD UNI: CPT | Mod: TC,RT

## 2022-11-17 PROCEDURE — 76642 US BREAST RIGHT LIMITED: ICD-10-PCS | Mod: 26,RT,, | Performed by: RADIOLOGY

## 2022-11-17 PROCEDURE — 77061 MAMMO DIGITAL DIAGNOSTIC RIGHT WITH TOMO: ICD-10-PCS | Mod: 26,RT,, | Performed by: RADIOLOGY

## 2022-11-17 PROCEDURE — 76642 ULTRASOUND BREAST LIMITED: CPT | Mod: TC,RT

## 2022-11-17 PROCEDURE — 77065 MAMMO DIGITAL DIAGNOSTIC RIGHT WITH TOMO: ICD-10-PCS | Mod: 26,RT,, | Performed by: RADIOLOGY

## 2022-11-17 PROCEDURE — 76642 ULTRASOUND BREAST LIMITED: CPT | Mod: 26,RT,, | Performed by: RADIOLOGY

## 2022-11-17 PROCEDURE — 77065 DX MAMMO INCL CAD UNI: CPT | Mod: 26,RT,, | Performed by: RADIOLOGY

## 2022-11-17 PROCEDURE — 77061 BREAST TOMOSYNTHESIS UNI: CPT | Mod: 26,RT,, | Performed by: RADIOLOGY

## 2022-11-18 ENCOUNTER — TELEPHONE (OUTPATIENT)
Dept: OBSTETRICS AND GYNECOLOGY | Facility: CLINIC | Age: 42
End: 2022-11-18
Payer: COMMERCIAL

## 2022-12-01 ENCOUNTER — CLINICAL SUPPORT (OUTPATIENT)
Dept: REHABILITATION | Facility: HOSPITAL | Age: 42
End: 2022-12-01
Attending: INTERNAL MEDICINE
Payer: COMMERCIAL

## 2022-12-01 DIAGNOSIS — R53.1 WEAKNESS: Primary | ICD-10-CM

## 2022-12-01 DIAGNOSIS — M62.89 PELVIC FLOOR DYSFUNCTION: ICD-10-CM

## 2022-12-01 DIAGNOSIS — R27.8 OTHER LACK OF COORDINATION: ICD-10-CM

## 2022-12-01 PROCEDURE — 97112 NEUROMUSCULAR REEDUCATION: CPT | Mod: PO

## 2022-12-01 PROCEDURE — 97140 MANUAL THERAPY 1/> REGIONS: CPT | Mod: PO

## 2022-12-01 NOTE — PROGRESS NOTES
"  Pelvic Health Physical Therapy   Treatment Note     Name: Basilia Austin Ural  Clinic Number: [id number]    Therapy Diagnosis:   Encounter Diagnoses   Name Primary?    Weakness Yes    Pelvic floor dysfunction     Other lack of coordination      Physician: Leeann Green MD    Visit Date: 12/1/2022    Physician Orders: PT Eval and Treat   Medical Diagnosis from Referral: stress urinary incontinence   Evaluation Date: 10/24/2022  Authorization Period Expiration: 09/30/2023  Plan of Care Expiration: 1-16-23  Progress Note Due: 11-24-22  Visit #/Visits authorized: 2/ 20   Cancelled Visits: 0  No Show Visits: 0  FOTO: Issued Vist #:  2/3  12/1/22    Time In: 805  Time Out: 900  Total Billable Time: 55 minutes    Precautions: Standard    Subjective     Pt reports: "I can't think of the last time I had an accident."  She has also been working on her scar as well every day.   She reports she had an extremely heavy period with lots of clotting.  She went through 26 pads in 2 days due to the heavy bleeding.  She is scheduled to see her GYN on 12/9 to discuss.  Her next period is expected to start in a few days.      She was compliant with home exercise program.  Response to previous treatment: no issues reported  Functional change: less leakage    Pain: none reported  Location: NA    Constitutional Symptoms Review: The patient denies having any constitutional symptoms.     Objective   Pt verbally consents to intravaginal treatment today.  Signed consent form already on file.       Basilia received the following manual therapy techniques: to develop flexibility, extensibility, and desensitization for 30 minutes including: trigger point/myofascial release of left obturator internus and levator ani intravaginally and scar mobilization of suprapubic scar in all planes      Basilia participated in neuromuscular re-education activities to develop Coordination, Control, and Down training for 25 minutes including: pelvic floor " relaxation/bulging training and 360 breathing   Used tapping intravaginally over muscle bellies to elicit a contraction with kegels. Patient demonstrates a flicker of activation about 40% of the time but improves with practice and verbal cues.  Used a towel in sitting for external feedback when attempting to perform a kegel.            Home Exercises Provided and Patient Education Provided     Education provided:   - anatomy/physiology of pelvic floor  Discussed progression of plan of care with patient; educated pt in activity modification; reviewed HEP with pt. Pt demonstrated and verbalized understanding of all instruction and was provided with a handout of HEP (see Patient Instructions).  -     Written Home Exercises Provided: yes.  Exercises were reviewed and Basilia was able to demonstrate them prior to the end of the session.  Basilia demonstrated good  understanding of the education provided.     See EMR under Patient Instructions for exercises provided 12/1/2022.    Assessment     Performed trigger point/myofascial release of left obturator internus and levator ani intravaginally to help with increased resting tension and pain with palpation on the left.  Also performed scar mobilization of suprapubic scar in all planes to help decrease tension to pelvic structures.  Patient struggles to activate her pelvic floor muscle(s) at this time.  Used tapping intravaginally over muscle bellies to elicit a contraction with kegels. Patient demonstrates a flicker of activation about 40% of the time but improves with practice and verbal cues.  Used a towel in sitting for external feedback when attempting to perform a kegel.     Basilia Is progressing well towards her goals.   Pt prognosis is Excellent.     Pt will continue to benefit from skilled outpatient physical therapy to address the deficits listed in the problem list box on initial evaluation, provide pt/family education and to maximize pt's level of independence in  the home and community environment.     Pt's spiritual, cultural and educational needs considered and pt agreeable to plan of care and goals.     Anticipated barriers to physical therapy: none    Goals:   Short Term Goals: 4 weeks   Pt to demonstrate an improved score in the FOTO urinary problem survey  to at least 56 to demonstrate improving bladder control with activities of daily living.    Pt to demonstrate being able to correctly and consistently perform a kegel which is needed  to increase pelvic floor muscle coordination and strength needed for continence.  Pt to be able to delay the urge to urinate at least 5 minutes with a strong urge to urinate in order to make it to the bathroom without leaking.  Pt to voice understanding of the role that diet plays on urinary urgency.    Pt to report a decrease in pain to no more than 4 at it's worst with  activities of daily living with her hip.                Long Term Goals: 12 weeks   Pt to be discharged with home plan for carry over after discharge.    Pt to report an 80% reduction of urinary incontinence symptoms with ADL participation thereby demonstrating improved pelvic floor muscle control and strength.   Pt to demonstrate an improved score in the FOTO urinary problems survey  to at least 60 to demonstrate improving bladder control with activities of daily living.    Pt to be able to delay the urge to urinate at least 10 minutes with a strong urge to urinate in order to make it to the bathroom without leaking.  Pt to increase pelvic floor strength to at least 2/5 to demonstrate improved strength needed for continence with ADLs.   Pt to report a decrease in pain to no more than 2 at it's worst with activities of daily living with her hip.                PLAN   Plan of care Certification: 10/24/2022 to 1-16-23.     Outpatient Physical Therapy 2 times weekly for 12 weeks to include the following interventions: therapeutic exercises, therapeutic activity,  neuromuscular re-education, gait training, manual therapy, modalities PRN, patient/family education, dry needling, and self care/home management       Narda Giles, PT

## 2022-12-01 NOTE — PATIENT INSTRUCTIONS
Kegels: Try rolling up a hand towel and sitting on it.  This can be used for feedback when doing the kegels. Try to pull your muscles up and away from the towel.                360 Breath - Inhale long, slow and deep. You should feel as if your lower ribs are expanding in all directions like the way an umbrella opens. You should feel the belly, back and sides gently expand and you may notice a relaxation in the pelvic floor.     Continue to breath like this for 2-5 minutes. Repeat 1-2 times/day.

## 2022-12-08 ENCOUNTER — CLINICAL SUPPORT (OUTPATIENT)
Dept: REHABILITATION | Facility: HOSPITAL | Age: 42
End: 2022-12-08
Attending: INTERNAL MEDICINE
Payer: COMMERCIAL

## 2022-12-08 DIAGNOSIS — M62.89 PELVIC FLOOR DYSFUNCTION: ICD-10-CM

## 2022-12-08 DIAGNOSIS — R27.8 OTHER LACK OF COORDINATION: ICD-10-CM

## 2022-12-08 DIAGNOSIS — R53.1 WEAKNESS: Primary | ICD-10-CM

## 2022-12-08 PROCEDURE — 97112 NEUROMUSCULAR REEDUCATION: CPT | Mod: PO

## 2022-12-08 PROCEDURE — 97140 MANUAL THERAPY 1/> REGIONS: CPT | Mod: PO

## 2022-12-08 PROCEDURE — 97110 THERAPEUTIC EXERCISES: CPT | Mod: PO

## 2022-12-08 NOTE — PROGRESS NOTES
Pelvic Health Physical Therapy   Treatment Note     Name: Basilia Austin Ural  Clinic Number: [id number]    Therapy Diagnosis:   Encounter Diagnoses   Name Primary?    Weakness Yes    Pelvic floor dysfunction     Other lack of coordination      Physician: Leeann Green MD    Visit Date: 12/8/2022    Physician Orders: PT Eval and Treat   Medical Diagnosis from Referral: stress urinary incontinence   Evaluation Date: 10/24/2022  Authorization Period Expiration: 09/30/2023  Plan of Care Expiration: 1-16-23  Progress Note Due: 11-24-22  Visit #/Visits authorized: 2/ 20   Cancelled Visits: 0  No Show Visits: 0  FOTO: Issued Vist #:  2/3  12/1/22    Time In: 1007  Time Out: 1001  Total Billable Time: 54 minutes    Precautions: Standard    Subjective     Pt reports: She reports she leaked 2x the day of her last treatment and 4x in total since that visit.  She has been working on her exercises though.        She was compliant with home exercise program.  Response to previous treatment: no issues reported  Functional change: less leakage    Pain: none reported  Location: NA    Constitutional Symptoms Review: The patient denies having any constitutional symptoms.     Objective   Pt verbally consents to intravaginal treatment today.  Signed consent form already on file.       Basilia received the following manual therapy techniques: to develop flexibility, extensibility, and desensitization for 23 minutes including: trigger point/myofascial release of left obturator internus and levator ani intravaginally and scar mobilization of suprapubic scar in all planes      Basilia participated in neuromuscular re-education activities to develop Coordination, Control, and Down training for 23 minutes including: pelvic floor relaxation/bulging training and 360 breathing   Used tapping intravaginally over muscle bellies to elicit a contraction with kegels. Patient demonstrates a flicker of activation about 80% of the time but improves  with practice and verbal cues.  Used a towel in sitting for external feedback when attempting to perform a kegel.        Therapeutic Exercise to develop  strength and endurance for 8 minutes including:   Kegels Endurance Holds and Kegels: Quick flicks   Posterior pelvic tilt 5 sec x 10 reps   Clamshell x10 reps bilaterally   Bridge with posterior pelvic tilt 5 sec x 10 reps     Home Exercises Provided and Patient Education Provided     Education provided:   - anatomy/physiology of pelvic floor  Discussed progression of plan of care with patient; educated pt in activity modification; reviewed HEP with pt. Pt demonstrated and verbalized understanding of all instruction and was provided with a handout of HEP (see Patient Instructions).  -     Written Home Exercises Provided: yes.  Exercises were reviewed and Basilia was able to demonstrate them prior to the end of the session.  Basilia demonstrated good  understanding of the education provided.     See EMR under Patient Instructions for exercises provided 12/1/2022.    Assessment      Performed trigger point/myofascial release of left obturator internus and levator ani intravaginally to help with increased resting tension and pain with palpation on the left.  Also performed scar mobilization of suprapubic scar in all planes to help decrease tension to pelvic structures.  Patient struggles to activate her pelvic floor muscle(s) at this time.  Used tapping intravaginally over muscle bellies to elicit a contraction with kegels. Patient demonstrates a flicker of activation about 80% of the time but improves with practice and verbal cues.  Noted to have improved consistency when attempting to perform a kegel today.      Basilia Is progressing well towards her goals.   Pt prognosis is Excellent.     Pt will continue to benefit from skilled outpatient physical therapy to address the deficits listed in the problem list box on initial evaluation, provide pt/family education and to  maximize pt's level of independence in the home and community environment.     Pt's spiritual, cultural and educational needs considered and pt agreeable to plan of care and goals.     Anticipated barriers to physical therapy: none    Goals:   Short Term Goals: 4 weeks   Pt to demonstrate an improved score in the FOTO urinary problem survey  to at least 56 to demonstrate improving bladder control with activities of daily living.    Pt to demonstrate being able to correctly and consistently perform a kegel which is needed  to increase pelvic floor muscle coordination and strength needed for continence.  Pt to be able to delay the urge to urinate at least 5 minutes with a strong urge to urinate in order to make it to the bathroom without leaking.  Pt to voice understanding of the role that diet plays on urinary urgency.    Pt to report a decrease in pain to no more than 4 at it's worst with  activities of daily living with her hip.                Long Term Goals: 12 weeks   Pt to be discharged with home plan for carry over after discharge.    Pt to report an 80% reduction of urinary incontinence symptoms with ADL participation thereby demonstrating improved pelvic floor muscle control and strength.   Pt to demonstrate an improved score in the FOTO urinary problems survey  to at least 60 to demonstrate improving bladder control with activities of daily living.    Pt to be able to delay the urge to urinate at least 10 minutes with a strong urge to urinate in order to make it to the bathroom without leaking.  Pt to increase pelvic floor strength to at least 2/5 to demonstrate improved strength needed for continence with ADLs.   Pt to report a decrease in pain to no more than 2 at it's worst with activities of daily living with her hip.                PLAN   Plan of care Certification: 10/24/2022 to 1-16-23.     Outpatient Physical Therapy 2 times weekly for 12 weeks to include the following interventions: therapeutic  exercises, therapeutic activity, neuromuscular re-education, gait training, manual therapy, modalities PRN, patient/family education, dry needling, and self care/home management       Narda Giles, PT

## 2022-12-15 ENCOUNTER — CLINICAL SUPPORT (OUTPATIENT)
Dept: REHABILITATION | Facility: HOSPITAL | Age: 42
End: 2022-12-15
Attending: INTERNAL MEDICINE
Payer: COMMERCIAL

## 2022-12-15 DIAGNOSIS — R53.1 WEAKNESS: Primary | ICD-10-CM

## 2022-12-15 DIAGNOSIS — R27.8 OTHER LACK OF COORDINATION: ICD-10-CM

## 2022-12-15 DIAGNOSIS — M62.89 PELVIC FLOOR DYSFUNCTION: ICD-10-CM

## 2022-12-15 PROCEDURE — 97112 NEUROMUSCULAR REEDUCATION: CPT | Mod: PO

## 2022-12-15 PROCEDURE — 97140 MANUAL THERAPY 1/> REGIONS: CPT | Mod: PO

## 2022-12-15 PROCEDURE — 97110 THERAPEUTIC EXERCISES: CPT | Mod: PO

## 2022-12-15 PROCEDURE — 97530 THERAPEUTIC ACTIVITIES: CPT | Mod: PO

## 2022-12-15 NOTE — PATIENT INSTRUCTIONS
Home Exercise Program: 12/15/2022    Hip and Pelvic Stretching Program                         Home Exercise Program: 12/15/2022    Bowel Movement Mechanics  1. Sit on the toilet comfortably with legs and buttocks relaxed.  2. Put your feet on a step stool or squatty potty (8 inches tall).  3. Lean forward while keeping your back straight.  4. Keep your knees apart.  5. Exhale like you are blowing out birthday candles while you gently bear down.       Do not strain and Do not hold your breath.

## 2022-12-15 NOTE — PROGRESS NOTES
Pelvic Health Physical Therapy   Treatment Note     Name: Basilia Austin Ural  Clinic Number: [id number]    Therapy Diagnosis:   Encounter Diagnoses   Name Primary?    Weakness Yes    Pelvic floor dysfunction     Other lack of coordination      Physician: Leeann Green MD    Visit Date: 12/15/2022    Physician Orders: PT Eval and Treat   Medical Diagnosis from Referral: stress urinary incontinence   Evaluation Date: 10/24/2022  Authorization Period Expiration: 09/30/2023  Plan of Care Expiration: 1-16-23  Progress Note Due: 11-24-22  Visit #/Visits authorized: 2/ 20   Cancelled Visits: 0  No Show Visits: 0  FOTO: Issued Vist #:  2/3  12/1/22    Time In: 1003  Time Out: 1106  Total Billable Time: 63 minutes    Precautions: Standard    Subjective     Pt reports: She reports she leaked 2x since her last visit.  Both times were in the morning while she was in bed.          She was compliant with home exercise program.  Response to previous treatment: no issues reported  Functional change: less leakage    Pain: none reported  Location: NA    Constitutional Symptoms Review: The patient denies having any constitutional symptoms.     Objective   Pt verbally consents to intravaginal treatment today.  Signed consent form already on file.       Basilia received the following manual therapy techniques: to develop flexibility, extensibility, and desensitization for 15 minutes including: trigger point/myofascial release of left obturator internus and levator ani intravaginally      Basilia participated in neuromuscular re-education activities to develop Coordination, Control, and Down training for 23 minutes including: pelvic floor relaxation/bulging training and 360 breathing   Used tapping intravaginally over muscle bellies to elicit a contraction with kegels. Patient demonstrates a flicker of activation about 80% of the time but improves with practice and verbal cues.      Therapeutic Exercise to develop  strength and  endurance for 15 minutes including:   Kegels Endurance Holds and Kegels: Quick flicks   Posterior pelvic tilt 5 sec x 10 reps x 2 sets   Clamshell 5 sec x10 reps bilaterally x 2 sets   Bridge with posterior pelvic tilt 5 sec x 20 reps   Piriformis stretch 3x30 sec bilaterally     Therapeutic Activity to improve dynamic functional performance, coordination and education for 10 minutes. Including: Edu on positioning on commode with breathing technique to improve ability to relax pelvic floor and have BM with less need to strain.  Practiced technique with patient and made modifications in technique as needed.  Shaquille picture of impact on positioning and breathing on colorectal angle for visual education.  Further edu patient by showing squatty potty video of impact of positioning on ability to have BM.         Home Exercises Provided and Patient Education Provided     Education provided:   - anatomy/physiology of pelvic floor  Discussed progression of plan of care with patient; educated pt in activity modification; reviewed HEP with pt. Pt demonstrated and verbalized understanding of all instruction and was provided with a handout of HEP (see Patient Instructions).  -     Written Home Exercises Provided: yes.  Exercises were reviewed and Basilia was able to demonstrate them prior to the end of the session.  Basilia demonstrated good  understanding of the education provided.     See EMR under Patient Instructions for exercises provided 12/1/2022.    Assessment     Performed trigger point/myofascial release of left obturator internus and levator ani intravaginally to help with increased resting tension and pain with palpation on the left.  Patient continues to struggle to activate her pelvic floor muscle(s) but with the use of visualizations is better able to lightly flicker the pelvic floor muscle(s).  Used tapping intravaginally over muscle bellies to elicit a contraction with kegels. Patient demonstrates a flicker of  activation about 80% of the time but improves with practice and verbal cues.  Noted to have improved consistency when attempting to perform a kegel today.      Basilia Is progressing well towards her goals.   Pt prognosis is Excellent.     Pt will continue to benefit from skilled outpatient physical therapy to address the deficits listed in the problem list box on initial evaluation, provide pt/family education and to maximize pt's level of independence in the home and community environment.     Pt's spiritual, cultural and educational needs considered and pt agreeable to plan of care and goals.     Anticipated barriers to physical therapy: none    Goals:   Short Term Goals: 4 weeks   Pt to demonstrate an improved score in the FOTO urinary problem survey  to at least 56 to demonstrate improving bladder control with activities of daily living.    Pt to demonstrate being able to correctly and consistently perform a kegel which is needed  to increase pelvic floor muscle coordination and strength needed for continence.  Pt to be able to delay the urge to urinate at least 5 minutes with a strong urge to urinate in order to make it to the bathroom without leaking.  Pt to voice understanding of the role that diet plays on urinary urgency.    Pt to report a decrease in pain to no more than 4 at it's worst with  activities of daily living with her hip.                Long Term Goals: 12 weeks   Pt to be discharged with home plan for carry over after discharge.    Pt to report an 80% reduction of urinary incontinence symptoms with ADL participation thereby demonstrating improved pelvic floor muscle control and strength.   Pt to demonstrate an improved score in the FOTO urinary problems survey  to at least 60 to demonstrate improving bladder control with activities of daily living.    Pt to be able to delay the urge to urinate at least 10 minutes with a strong urge to urinate in order to make it to the bathroom without  leaking.  Pt to increase pelvic floor strength to at least 2/5 to demonstrate improved strength needed for continence with ADLs.   Pt to report a decrease in pain to no more than 2 at it's worst with activities of daily living with her hip.                PLAN   Plan of care Certification: 10/24/2022 to 1-16-23.     Outpatient Physical Therapy 2 times weekly for 12 weeks to include the following interventions: therapeutic exercises, therapeutic activity, neuromuscular re-education, gait training, manual therapy, modalities PRN, patient/family education, dry needling, and self care/home management       Narda Giles, PT

## 2022-12-22 ENCOUNTER — CLINICAL SUPPORT (OUTPATIENT)
Dept: REHABILITATION | Facility: HOSPITAL | Age: 42
End: 2022-12-22
Attending: INTERNAL MEDICINE
Payer: COMMERCIAL

## 2022-12-22 DIAGNOSIS — R53.1 WEAKNESS: Primary | ICD-10-CM

## 2022-12-22 DIAGNOSIS — M62.89 PELVIC FLOOR DYSFUNCTION: ICD-10-CM

## 2022-12-22 DIAGNOSIS — R27.8 OTHER LACK OF COORDINATION: ICD-10-CM

## 2022-12-22 PROCEDURE — 97112 NEUROMUSCULAR REEDUCATION: CPT | Mod: PO

## 2022-12-22 PROCEDURE — 97110 THERAPEUTIC EXERCISES: CPT | Mod: PO

## 2022-12-22 NOTE — PROGRESS NOTES
Pelvic Health Physical Therapy   Treatment Note     Name: Basilia Austin Ural  Clinic Number: [id number]    Therapy Diagnosis:   Encounter Diagnoses   Name Primary?    Weakness Yes    Pelvic floor dysfunction     Other lack of coordination        Physician: Leeann Green MD    Visit Date: 12/22/2022    Physician Orders: PT Eval and Treat   Medical Diagnosis from Referral: stress urinary incontinence   Evaluation Date: 10/24/2022  Authorization Period Expiration: 09/30/2023  Plan of Care Expiration: 1-16-23  Progress Note Due: 11-24-22  Visit #/Visits authorized: 4/ 20   Cancelled Visits: 0  No Show Visits: 0  FOTO: Issued Vist #:  2/3  12/1/22    Time In: 1005  Time Out: 1100  Total Billable Time: 55 minutes    Precautions: Standard    Subjective     Pt reports: She reports she has been feeling frustrated this week due to some leakage from coughing or sneezing.      She was compliant with home exercise program.  Response to previous treatment: no issues reported  Functional change: less leakage    Pain: none reported  Location: NA    Constitutional Symptoms Review: The patient denies having any constitutional symptoms.     Objective   Pt verbally consents to intravaginal treatment today.  Signed consent form already on file.       Basilia received the following manual therapy techniques: to develop flexibility, extensibility, and desensitization for 00 minutes including: trigger point/myofascial release of left obturator internus and levator ani intravaginally      Basilia participated in neuromuscular re-education activities to develop Coordination, Control, and Down training for 40 minutes including: pelvic floor relaxation/bulging training and RUSI for breathing, drops, contractions of the PFM to help pt visualize PFM motion and function.  Used tapping intravaginally over muscle bellies to elicit a contraction with kegels. Patient demonstrates a flicker of activation about 80% of the time but improves with  practice and verbal cues.          Therapeutic Exercise to develop  strength and endurance for 15 minutes including:   Kegels Endurance Holds and Kegels: Quick flicks   Posterior pelvic tilt 5 sec x 10 reps x 2 sets   Clamshell 5 sec x10 reps bilaterally x 2 sets with green theraband   Bridge with posterior pelvic tilt 5 sec x 20 reps   Piriformis stretch 3x30 sec bilaterally not today     Therapeutic Activity to improve dynamic functional performance, coordination and education for 00 minutes. Including: Edu on positioning on commode with breathing technique to improve ability to relax pelvic floor and have BM with less need to strain.  Practiced technique with patient and made modifications in technique as needed.  Shaquille picture of impact on positioning and breathing on colorectal angle for visual education.  Further edu patient by showing squatty potty video of impact of positioning on ability to have BM.         Home Exercises Provided and Patient Education Provided     Education provided:   - anatomy/physiology of pelvic floor  Discussed progression of plan of care with patient; educated pt in activity modification; reviewed HEP with pt. Pt demonstrated and verbalized understanding of all instruction and was provided with a handout of HEP (see Patient Instructions).  -     Written Home Exercises Provided: yes.  Exercises were reviewed and Basilia was able to demonstrate them prior to the end of the session.  Basilia demonstrated good  understanding of the education provided.     See EMR under Patient Instructions for exercises provided 12/1/2022.    Assessment     Patient able to demonstrate overflow activation of the posterior pelvic floor with gluteal squeeze today. Continued using intravaginal tapping over the muscle(s) bellies to help elicit a contraction.  Also used rehabilitative ultrasound imaging to help patient visualize the pelvic floor muscle(s) and better see what a kegel looks like.        Basilia Is  progressing well towards her goals.   Pt prognosis is Excellent.     Pt will continue to benefit from skilled outpatient physical therapy to address the deficits listed in the problem list box on initial evaluation, provide pt/family education and to maximize pt's level of independence in the home and community environment.     Pt's spiritual, cultural and educational needs considered and pt agreeable to plan of care and goals.     Anticipated barriers to physical therapy: none    Goals:   Short Term Goals: 4 weeks   Pt to demonstrate an improved score in the FOTO urinary problem survey  to at least 56 to demonstrate improving bladder control with activities of daily living.    Pt to demonstrate being able to correctly and consistently perform a kegel which is needed  to increase pelvic floor muscle coordination and strength needed for continence.  Pt to be able to delay the urge to urinate at least 5 minutes with a strong urge to urinate in order to make it to the bathroom without leaking.  Pt to voice understanding of the role that diet plays on urinary urgency.    Pt to report a decrease in pain to no more than 4 at it's worst with  activities of daily living with her hip.                Long Term Goals: 12 weeks   Pt to be discharged with home plan for carry over after discharge.    Pt to report an 80% reduction of urinary incontinence symptoms with ADL participation thereby demonstrating improved pelvic floor muscle control and strength.   Pt to demonstrate an improved score in the FOTO urinary problems survey  to at least 60 to demonstrate improving bladder control with activities of daily living.    Pt to be able to delay the urge to urinate at least 10 minutes with a strong urge to urinate in order to make it to the bathroom without leaking.  Pt to increase pelvic floor strength to at least 2/5 to demonstrate improved strength needed for continence with ADLs.   Pt to report a decrease in pain to no more than  2 at it's worst with activities of daily living with her hip.                PLAN   Plan of care Certification: 10/24/2022 to 1-16-23.     Outpatient Physical Therapy 2 times weekly for 12 weeks to include the following interventions: therapeutic exercises, therapeutic activity, neuromuscular re-education, gait training, manual therapy, modalities PRN, patient/family education, dry needling, and self care/home management       Narda Giles, PT

## 2023-01-05 ENCOUNTER — CLINICAL SUPPORT (OUTPATIENT)
Dept: REHABILITATION | Facility: HOSPITAL | Age: 43
End: 2023-01-05
Attending: INTERNAL MEDICINE
Payer: COMMERCIAL

## 2023-01-05 DIAGNOSIS — M62.89 PELVIC FLOOR DYSFUNCTION: ICD-10-CM

## 2023-01-05 DIAGNOSIS — R53.1 WEAKNESS: Primary | ICD-10-CM

## 2023-01-05 DIAGNOSIS — R27.8 OTHER LACK OF COORDINATION: ICD-10-CM

## 2023-01-05 PROCEDURE — 97112 NEUROMUSCULAR REEDUCATION: CPT | Mod: PO

## 2023-01-05 PROCEDURE — 97140 MANUAL THERAPY 1/> REGIONS: CPT | Mod: PO

## 2023-01-05 NOTE — PROGRESS NOTES
Pelvic Health Physical Therapy   Treatment Note     Name: Basilia Austin Ural  Clinic Number: [id number]    Therapy Diagnosis:   Encounter Diagnoses   Name Primary?    Weakness Yes    Pelvic floor dysfunction     Other lack of coordination        Physician: Leeann Green MD    Visit Date: 1/5/2023    Physician Orders: PT Eval and Treat   Medical Diagnosis from Referral: stress urinary incontinence   Evaluation Date: 10/24/2022  Authorization Period Expiration: 09/30/2023  Plan of Care Expiration: 1-16-23  Progress Note Due: 11-24-22  Visit #/Visits authorized: 4/ 20   Cancelled Visits: 0  No Show Visits: 0  FOTO: Issued Vist #:  2/3  12/1/22    Time In: 1005  Time Out: 1105  Total Billable Time: 60 minutes    Precautions: Standard    Subjective     Pt reports: She reports she had ice tea and it irritated her bladder.  Reports she's had a couple of leaks.  She has been keeping up with her exercises.        She was compliant with home exercise program.  Response to previous treatment: no issues reported  Functional change: less leakage    Pain: none reported  Location: NA    Constitutional Symptoms Review: The patient denies having any constitutional symptoms.     Objective   Pt verbally consents to intravaginal treatment today.  Signed consent form already on file.       Basilia received the following manual therapy techniques: to develop flexibility, extensibility, and desensitization for 30 minutes including: trigger point/myofascial release of left obturator internus and levator ani intravaginally      Basilia participated in neuromuscular re-education activities to develop Coordination, Control, and Down training for 30 minutes including: pelvic floor relaxation/bulging training and RUSI for breathing, drops, contractions of the PFM to help pt visualize PFM motion and function.  Used tapping intravaginally over muscle bellies to elicit a contraction with kegels. Patient demonstrates a flicker of activation  about 80% of the time but improves with practice and verbal cues.          Therapeutic Exercise to develop  strength and endurance for 00 minutes including:   Kegels Endurance Holds and Kegels: Quick flicks   Posterior pelvic tilt 5 sec x 10 reps x 2 sets   Clamshell 5 sec x10 reps bilaterally x 2 sets with green theraband   Bridge with posterior pelvic tilt 5 sec x 20 reps   Piriformis stretch 3x30 sec bilaterally not today     Therapeutic Activity to improve dynamic functional performance, coordination and education for 00 minutes. Including: Edu on positioning on commode with breathing technique to improve ability to relax pelvic floor and have BM with less need to strain.  Practiced technique with patient and made modifications in technique as needed.  Shaquille picture of impact on positioning and breathing on colorectal angle for visual education.  Further edu patient by showing squatty potty video of impact of positioning on ability to have BM.         Home Exercises Provided and Patient Education Provided     Education provided:   - anatomy/physiology of pelvic floor  Discussed progression of plan of care with patient; educated pt in activity modification; reviewed HEP with pt. Pt demonstrated and verbalized understanding of all instruction and was provided with a handout of HEP (see Patient Instructions).  -     Written Home Exercises Provided: yes.  Exercises were reviewed and Basilia was able to demonstrate them prior to the end of the session.  Basilia demonstrated good  understanding of the education provided.     See EMR under Patient Instructions for exercises provided 12/1/2022.    Assessment     Worked on pelvic floor muscle(s) relaxation and downtraining today as patient is noted to have increased resting tension.  She also reports pain with muscular palpation.  After working on pelvic floor muscle(s) downtraining and myofascial release patient is noted to be able to perform a slight kegel.  Will continue  to work on pelvic floor muscle(s) downtraining and myofascial release in order decrease patient's active insufficiency with keges.      Basilia Is progressing well towards her goals.   Pt prognosis is Excellent.     Pt will continue to benefit from skilled outpatient physical therapy to address the deficits listed in the problem list box on initial evaluation, provide pt/family education and to maximize pt's level of independence in the home and community environment.     Pt's spiritual, cultural and educational needs considered and pt agreeable to plan of care and goals.     Anticipated barriers to physical therapy: none    Goals:   Short Term Goals: 4 weeks   Pt to demonstrate an improved score in the FOTO urinary problem survey  to at least 56 to demonstrate improving bladder control with activities of daily living.    Pt to demonstrate being able to correctly and consistently perform a kegel which is needed  to increase pelvic floor muscle coordination and strength needed for continence.  Pt to be able to delay the urge to urinate at least 5 minutes with a strong urge to urinate in order to make it to the bathroom without leaking.  Pt to voice understanding of the role that diet plays on urinary urgency.    Pt to report a decrease in pain to no more than 4 at it's worst with  activities of daily living with her hip.                Long Term Goals: 12 weeks   Pt to be discharged with home plan for carry over after discharge.    Pt to report an 80% reduction of urinary incontinence symptoms with ADL participation thereby demonstrating improved pelvic floor muscle control and strength.   Pt to demonstrate an improved score in the FOTO urinary problems survey  to at least 60 to demonstrate improving bladder control with activities of daily living.    Pt to be able to delay the urge to urinate at least 10 minutes with a strong urge to urinate in order to make it to the bathroom without leaking.  Pt to increase pelvic  floor strength to at least 2/5 to demonstrate improved strength needed for continence with ADLs.   Pt to report a decrease in pain to no more than 2 at it's worst with activities of daily living with her hip.                PLAN   Plan of care Certification: 10/24/2022 to 1-16-23.     Outpatient Physical Therapy 2 times weekly for 12 weeks to include the following interventions: therapeutic exercises, therapeutic activity, neuromuscular re-education, gait training, manual therapy, modalities PRN, patient/family education, dry needling, and self care/home management       Narda Giles, PT

## 2023-03-30 ENCOUNTER — PATIENT MESSAGE (OUTPATIENT)
Dept: REHABILITATION | Facility: HOSPITAL | Age: 43
End: 2023-03-30
Payer: COMMERCIAL

## 2023-04-06 ENCOUNTER — CLINICAL SUPPORT (OUTPATIENT)
Dept: REHABILITATION | Facility: HOSPITAL | Age: 43
End: 2023-04-06
Attending: STUDENT IN AN ORGANIZED HEALTH CARE EDUCATION/TRAINING PROGRAM
Payer: COMMERCIAL

## 2023-04-06 DIAGNOSIS — R53.1 WEAKNESS: Primary | ICD-10-CM

## 2023-04-06 DIAGNOSIS — R27.8 OTHER LACK OF COORDINATION: ICD-10-CM

## 2023-04-06 DIAGNOSIS — M62.89 PELVIC FLOOR DYSFUNCTION: ICD-10-CM

## 2023-04-06 PROCEDURE — 97140 MANUAL THERAPY 1/> REGIONS: CPT | Mod: PO

## 2023-04-06 PROCEDURE — 97110 THERAPEUTIC EXERCISES: CPT | Mod: PO

## 2023-04-06 PROCEDURE — 97112 NEUROMUSCULAR REEDUCATION: CPT | Mod: PO

## 2023-04-06 PROCEDURE — 97530 THERAPEUTIC ACTIVITIES: CPT | Mod: PO

## 2023-04-06 NOTE — PATIENT INSTRUCTIONS
Home Exercise Program: 04/06/2023    Hip and Pelvic Stretching Program

## 2023-04-13 ENCOUNTER — PATIENT MESSAGE (OUTPATIENT)
Dept: REHABILITATION | Facility: HOSPITAL | Age: 43
End: 2023-04-13
Payer: COMMERCIAL

## 2023-04-14 ENCOUNTER — OFFICE VISIT (OUTPATIENT)
Dept: OBSTETRICS AND GYNECOLOGY | Facility: CLINIC | Age: 43
End: 2023-04-14
Payer: COMMERCIAL

## 2023-04-14 VITALS
BODY MASS INDEX: 40.54 KG/M2 | WEIGHT: 243.63 LBS | SYSTOLIC BLOOD PRESSURE: 122 MMHG | DIASTOLIC BLOOD PRESSURE: 90 MMHG

## 2023-04-14 DIAGNOSIS — N93.9 ABNORMAL UTERINE BLEEDING (AUB): Primary | ICD-10-CM

## 2023-04-14 LAB
B-HCG UR QL: NEGATIVE
CTP QC/QA: YES

## 2023-04-14 PROCEDURE — 81025 URINE PREGNANCY TEST: CPT | Mod: S$GLB,,, | Performed by: STUDENT IN AN ORGANIZED HEALTH CARE EDUCATION/TRAINING PROGRAM

## 2023-04-14 PROCEDURE — 88305 TISSUE EXAM BY PATHOLOGIST: CPT | Performed by: PATHOLOGY

## 2023-04-14 PROCEDURE — 3080F PR MOST RECENT DIASTOLIC BLOOD PRESSURE >= 90 MM HG: ICD-10-PCS | Mod: CPTII,S$GLB,, | Performed by: STUDENT IN AN ORGANIZED HEALTH CARE EDUCATION/TRAINING PROGRAM

## 2023-04-14 PROCEDURE — 3074F SYST BP LT 130 MM HG: CPT | Mod: CPTII,S$GLB,, | Performed by: STUDENT IN AN ORGANIZED HEALTH CARE EDUCATION/TRAINING PROGRAM

## 2023-04-14 PROCEDURE — 99214 PR OFFICE/OUTPT VISIT, EST, LEVL IV, 30-39 MIN: ICD-10-PCS | Mod: 25,S$GLB,, | Performed by: STUDENT IN AN ORGANIZED HEALTH CARE EDUCATION/TRAINING PROGRAM

## 2023-04-14 PROCEDURE — 88305 TISSUE EXAM BY PATHOLOGIST: ICD-10-PCS | Mod: 26,,, | Performed by: PATHOLOGY

## 2023-04-14 PROCEDURE — 88305 TISSUE EXAM BY PATHOLOGIST: CPT | Mod: 26,,, | Performed by: PATHOLOGY

## 2023-04-14 PROCEDURE — 3008F PR BODY MASS INDEX (BMI) DOCUMENTED: ICD-10-PCS | Mod: CPTII,S$GLB,, | Performed by: STUDENT IN AN ORGANIZED HEALTH CARE EDUCATION/TRAINING PROGRAM

## 2023-04-14 PROCEDURE — 3008F BODY MASS INDEX DOCD: CPT | Mod: CPTII,S$GLB,, | Performed by: STUDENT IN AN ORGANIZED HEALTH CARE EDUCATION/TRAINING PROGRAM

## 2023-04-14 PROCEDURE — 58100 ENDOMETRIAL BIOPSY: ICD-10-PCS | Mod: S$GLB,,, | Performed by: STUDENT IN AN ORGANIZED HEALTH CARE EDUCATION/TRAINING PROGRAM

## 2023-04-14 PROCEDURE — 3080F DIAST BP >= 90 MM HG: CPT | Mod: CPTII,S$GLB,, | Performed by: STUDENT IN AN ORGANIZED HEALTH CARE EDUCATION/TRAINING PROGRAM

## 2023-04-14 PROCEDURE — 58100 BIOPSY OF UTERUS LINING: CPT | Mod: S$GLB,,, | Performed by: STUDENT IN AN ORGANIZED HEALTH CARE EDUCATION/TRAINING PROGRAM

## 2023-04-14 PROCEDURE — 81025 POCT URINE PREGNANCY: ICD-10-PCS | Mod: S$GLB,,, | Performed by: STUDENT IN AN ORGANIZED HEALTH CARE EDUCATION/TRAINING PROGRAM

## 2023-04-14 PROCEDURE — 3074F PR MOST RECENT SYSTOLIC BLOOD PRESSURE < 130 MM HG: ICD-10-PCS | Mod: CPTII,S$GLB,, | Performed by: STUDENT IN AN ORGANIZED HEALTH CARE EDUCATION/TRAINING PROGRAM

## 2023-04-14 PROCEDURE — 1159F MED LIST DOCD IN RCRD: CPT | Mod: CPTII,S$GLB,, | Performed by: STUDENT IN AN ORGANIZED HEALTH CARE EDUCATION/TRAINING PROGRAM

## 2023-04-14 PROCEDURE — 99214 OFFICE O/P EST MOD 30 MIN: CPT | Mod: 25,S$GLB,, | Performed by: STUDENT IN AN ORGANIZED HEALTH CARE EDUCATION/TRAINING PROGRAM

## 2023-04-14 PROCEDURE — 1159F PR MEDICATION LIST DOCUMENTED IN MEDICAL RECORD: ICD-10-PCS | Mod: CPTII,S$GLB,, | Performed by: STUDENT IN AN ORGANIZED HEALTH CARE EDUCATION/TRAINING PROGRAM

## 2023-04-14 RX ORDER — TRANEXAMIC ACID 650 MG/1
1300 TABLET ORAL 3 TIMES DAILY
Qty: 30 TABLET | Refills: 5 | Status: SHIPPED | OUTPATIENT
Start: 2023-04-14 | End: 2023-04-19

## 2023-04-14 NOTE — PROCEDURES
Endometrial biopsy    Date/Time: 4/14/2023 1:00 PM  Performed by: Vicki Mosley MD  Authorized by: Vicki Mosley MD     Consent:     Consent obtained:  Written    Consent given by:  Patient    Patient questions answered: yes      Patient agrees, verbalizes understanding, and wants to proceed: yes    Indication:     Indications: Menorrhagia    Pre-procedure:     Pre-procedure timeout performed: yes    Procedure:     Procedure: endometrial biopsy with Pipelle      Cervix cleaned and prepped: yes      Uterus sounded: yes      Uterus sound depth (cm):  10    Curettes used:  2    Specimen collected: specimen collected and sent to pathology      Patient tolerated procedure well with no complications: yes    Comments:     Procedure comments:  Silver nitrate applied around entire edge of transformation zone  Hemostatic after application

## 2023-04-14 NOTE — PROGRESS NOTES
History & Physical  Gynecology      SUBJECTIVE:     Chief Complaint: Menorrhagia       History of Present Illness:    Was seen for annual and had normal periods in October  In November had week of heavy bleeding.went through 26 pads/tampons for 5 days  She then went til February. That lasted 5 days. Was extremely heavy as well   same thing.    She has long history of endometriosis and her body was so reactive the hormones, that the thought of needing these things triggers an emotional response for her      Review of patient's allergies indicates:   Allergen Reactions    Cephalosporins Anaphylaxis, Shortness Of Breath and Other (See Comments)    Pcn [penicillins]        History reviewed. No pertinent past medical history.  Past Surgical History:   Procedure Laterality Date    ADENOIDECTOMY      APPENDECTOMY      CHOLECYSTECTOMY      TONSILLECTOMY       OB History          2    Para   2    Term   2            AB        Living             SAB        IAB        Ectopic        Multiple        Live Births                   Family History   Problem Relation Age of Onset    Asthma Mother     Thyroid cancer Mother     Alcohol abuse Father     Drug abuse Brother     Lung cancer Maternal Grandmother     Prostate cancer Maternal Grandfather     Kidney cancer Paternal Grandmother     Alcohol abuse Paternal Grandfather     Skin cancer Paternal Grandfather      Social History     Tobacco Use    Smoking status: Never   Substance Use Topics    Alcohol use: Never    Drug use: Never       No current outpatient medications on file.     No current facility-administered medications for this visit.         Review of Systems:  Review of Systems   Constitutional:  Negative for fever.   Genitourinary:  Positive for dysmenorrhea, menorrhagia and menstrual problem. Negative for vaginal discharge.      OBJECTIVE:     Physical Exam:  Physical Exam  Vitals reviewed.   Constitutional:       General: She is  not in acute distress.     Appearance: She is well-developed. She is not diaphoretic.   HENT:      Head: Normocephalic and atraumatic.   Eyes:      Conjunctiva/sclera: Conjunctivae normal.   Cardiovascular:      Rate and Rhythm: Normal rate.   Pulmonary:      Effort: Pulmonary effort is normal.   Abdominal:      General: There is no distension.      Palpations: Abdomen is soft. There is no mass.      Tenderness: There is no abdominal tenderness. There is no guarding or rebound.   Genitourinary:     Labia:         Right: No rash, tenderness, lesion or injury.         Left: No rash, tenderness, lesion or injury.       Vagina: No signs of injury and foreign body. No vaginal discharge, erythema, tenderness or bleeding.      Cervix: No cervical motion tenderness, discharge or friability.      Uterus: Not deviated, not enlarged, not fixed and not tender.       Adnexa:         Right: No mass, tenderness or fullness.          Left: No mass, tenderness or fullness.     Musculoskeletal:         General: Normal range of motion.      Cervical back: Normal range of motion.   Skin:     General: Skin is warm and dry.   Neurological:      Mental Status: She is alert.         ASSESSMENT:       ICD-10-CM ICD-9-CM    1. Abnormal uterine bleeding (AUB)  N93.9 626.9 POCT urine pregnancy             Plan:      Patient was counseled today on the causes of AUB/menorrhagia: fibroids, polyps, adenomyosis, anovulation resulting in endometrial hyperplasia, endometritis, cervicitis; the need for a proper evaluation and for a tissue diagnosis was discussed. A Hysteroscopy D/C may be necessary. The need for a bHCG, CX C/S and TVS was discussed. The hormonal treatment options for menorrhagia include: Prometrium, BCPs + NSAID, IUD-Merena, the pros, cons, risks, and benefits of each was discussed; other options include Uterine ablation and Hysterectomy, each was discussed in detail. During the consultation session all of her question were  answered.  EMBX today  Pt considering H-scope with Ablation or Mirena if continues issues  TXA sent for futurte acute episodes (pt declines hormonal options)    Face to Face time with patient: 30    45 minutes of total time spent on the encounter, which includes face to face time and non-face to face time preparing to see the patient (eg, review of tests), Obtaining and/or reviewing separately obtained history, Documenting clinical information in the electronic or other health record, Independently interpreting results (not separately reported) and communicating results to the patient/family/caregiver, or Care coordination (not separately reported).      Vicki Mosley

## 2023-04-15 ENCOUNTER — HOSPITAL ENCOUNTER (OUTPATIENT)
Dept: RADIOLOGY | Facility: HOSPITAL | Age: 43
Discharge: HOME | End: 2023-04-15
Attending: STUDENT IN AN ORGANIZED HEALTH CARE EDUCATION/TRAINING PROGRAM
Payer: COMMERCIAL

## 2023-04-15 DIAGNOSIS — N93.9 ABNORMAL UTERINE BLEEDING (AUB): ICD-10-CM

## 2023-04-15 PROCEDURE — 76830 US PELVIS COMP WITH TRANSVAG NON-OB (XPD): ICD-10-PCS | Mod: 26,,, | Performed by: INTERNAL MEDICINE

## 2023-04-15 PROCEDURE — 76830 TRANSVAGINAL US NON-OB: CPT | Mod: 26,,, | Performed by: INTERNAL MEDICINE

## 2023-04-15 PROCEDURE — 76856 US PELVIS COMP WITH TRANSVAG NON-OB (XPD): ICD-10-PCS | Mod: 26,,, | Performed by: INTERNAL MEDICINE

## 2023-04-15 PROCEDURE — 76856 US EXAM PELVIC COMPLETE: CPT | Mod: TC

## 2023-04-15 PROCEDURE — 76856 US EXAM PELVIC COMPLETE: CPT | Mod: 26,,, | Performed by: INTERNAL MEDICINE

## 2023-04-21 ENCOUNTER — PATIENT MESSAGE (OUTPATIENT)
Dept: OBSTETRICS AND GYNECOLOGY | Facility: CLINIC | Age: 43
End: 2023-04-21
Payer: COMMERCIAL

## 2023-04-21 LAB
FINAL PATHOLOGIC DIAGNOSIS: NORMAL
GROSS: NORMAL
Lab: NORMAL

## 2023-04-25 DIAGNOSIS — N93.9 ABNORMAL UTERINE BLEEDING (AUB): Primary | ICD-10-CM

## 2023-04-25 RX ORDER — SODIUM CHLORIDE 9 MG/ML
INJECTION, SOLUTION INTRAVENOUS CONTINUOUS
Status: CANCELLED | OUTPATIENT
Start: 2023-04-25

## 2023-04-29 ENCOUNTER — PATIENT MESSAGE (OUTPATIENT)
Dept: OBSTETRICS AND GYNECOLOGY | Facility: CLINIC | Age: 43
End: 2023-04-29
Payer: COMMERCIAL

## 2023-05-02 ENCOUNTER — TELEPHONE (OUTPATIENT)
Dept: OBSTETRICS AND GYNECOLOGY | Facility: CLINIC | Age: 43
End: 2023-05-02
Payer: COMMERCIAL

## 2023-05-02 ENCOUNTER — OFFICE VISIT (OUTPATIENT)
Dept: OBSTETRICS AND GYNECOLOGY | Facility: CLINIC | Age: 43
End: 2023-05-02
Payer: COMMERCIAL

## 2023-05-02 ENCOUNTER — ANESTHESIA EVENT (OUTPATIENT)
Dept: SURGERY | Facility: OTHER | Age: 43
End: 2023-05-02
Payer: COMMERCIAL

## 2023-05-02 ENCOUNTER — HOSPITAL ENCOUNTER (OUTPATIENT)
Dept: PREADMISSION TESTING | Facility: OTHER | Age: 43
Discharge: HOME | End: 2023-05-02
Attending: STUDENT IN AN ORGANIZED HEALTH CARE EDUCATION/TRAINING PROGRAM
Payer: COMMERCIAL

## 2023-05-02 VITALS
SYSTOLIC BLOOD PRESSURE: 168 MMHG | BODY MASS INDEX: 40.44 KG/M2 | HEART RATE: 95 BPM | OXYGEN SATURATION: 98 % | TEMPERATURE: 99 F | DIASTOLIC BLOOD PRESSURE: 83 MMHG | WEIGHT: 243 LBS

## 2023-05-02 DIAGNOSIS — N93.9 ABNORMAL UTERINE BLEEDING (AUB): Primary | ICD-10-CM

## 2023-05-02 PROCEDURE — 99214 PR OFFICE/OUTPT VISIT, EST, LEVL IV, 30-39 MIN: ICD-10-PCS | Mod: 95,,, | Performed by: STUDENT IN AN ORGANIZED HEALTH CARE EDUCATION/TRAINING PROGRAM

## 2023-05-02 PROCEDURE — 99214 OFFICE O/P EST MOD 30 MIN: CPT | Mod: 95,,, | Performed by: STUDENT IN AN ORGANIZED HEALTH CARE EDUCATION/TRAINING PROGRAM

## 2023-05-02 RX ORDER — SODIUM CHLORIDE 0.9 % (FLUSH) 0.9 %
3 SYRINGE (ML) INJECTION
Status: CANCELLED | OUTPATIENT
Start: 2023-05-02

## 2023-05-02 RX ORDER — SODIUM CHLORIDE, SODIUM LACTATE, POTASSIUM CHLORIDE, CALCIUM CHLORIDE 600; 310; 30; 20 MG/100ML; MG/100ML; MG/100ML; MG/100ML
INJECTION, SOLUTION INTRAVENOUS CONTINUOUS
Status: CANCELLED | OUTPATIENT
Start: 2023-05-02

## 2023-05-02 RX ORDER — PROCHLORPERAZINE EDISYLATE 5 MG/ML
5 INJECTION INTRAMUSCULAR; INTRAVENOUS EVERY 30 MIN PRN
Status: CANCELLED | OUTPATIENT
Start: 2023-05-02

## 2023-05-02 RX ORDER — HYDROMORPHONE HYDROCHLORIDE 2 MG/ML
0.4 INJECTION, SOLUTION INTRAMUSCULAR; INTRAVENOUS; SUBCUTANEOUS EVERY 5 MIN PRN
Status: CANCELLED | OUTPATIENT
Start: 2023-05-02

## 2023-05-02 RX ORDER — MEPERIDINE HYDROCHLORIDE 25 MG/ML
12.5 INJECTION INTRAMUSCULAR; INTRAVENOUS; SUBCUTANEOUS ONCE AS NEEDED
Status: CANCELLED | OUTPATIENT
Start: 2023-05-02 | End: 2023-05-03

## 2023-05-02 RX ORDER — OXYCODONE HYDROCHLORIDE 5 MG/1
5 TABLET ORAL
Status: CANCELLED | OUTPATIENT
Start: 2023-05-02

## 2023-05-02 RX ORDER — ALBUTEROL SULFATE 90 UG/1
1-2 AEROSOL, METERED RESPIRATORY (INHALATION) EVERY 6 HOURS PRN
COMMUNITY
End: 2023-10-03 | Stop reason: SDUPTHER

## 2023-05-02 NOTE — ANESTHESIA PREPROCEDURE EVALUATION
05/02/2023  Basilia Delgado is a 42 y.o., female.      Pre-op Assessment    I have reviewed the Patient Summary Reports.     I have reviewed the Nursing Notes. I have reviewed the NPO Status.   I have reviewed the Medications.     Review of Systems  Anesthesia Hx:  No problems with previous Anesthesia  Denies Family Hx of Anesthesia complications.   Denies Personal Hx of Anesthesia complications.   Social:  Non-Smoker    Hematology/Oncology:  Hematology Normal   Oncology Normal     EENT/Dental:EENT/Dental Normal   Cardiovascular:  Cardiovascular Normal Exercise tolerance: good     Pulmonary:   Asthma mild    Renal/:  Renal/ Normal     Musculoskeletal:  Musculoskeletal Normal    Neurological:  Neurology Normal    Endocrine:  Endocrine Normal  Morbid Obesity / BMI > 40  Dermatological:  Skin Normal    Psych:  Psychiatric Normal           Physical Exam  General: Well nourished, Cooperative, Oriented and Alert    Airway:  Mouth Opening: Normal  TM Distance: Normal  Neck ROM: Normal ROM    Dental:  Intact        Anesthesia Plan  Type of Anesthesia, risks & benefits discussed:    Anesthesia Type: Gen ETT, Gen Supraglottic Airway  Intra-op Monitoring Plan: Standard ASA Monitors  Post Op Pain Control Plan: multimodal analgesia  Induction:  IV  Airway Plan: Video and Direct  Informed Consent: Informed consent signed with the Patient and all parties understand the risks and agree with anesthesia plan.  All questions answered.   ASA Score: 3  Day of Surgery Review of History & Physical: H&P Update referred to the surgeon/provider.  Anesthesia Plan Notes: CBC today for patient bleeding    Ready For Surgery From Anesthesia Perspective.     .

## 2023-05-02 NOTE — PROGRESS NOTES
The patient location is:  Patient Home   The chief complaint leading to consultation is: pre op  Visit type: Virtual visit with synchronous audio and video  Total time spent with patient: 15 min  Each patient to whom he or she provides medical services by telemedicine is:  (1) informed of the relationship between the physician and patient and the respective role of any other health care provider with respect to management of the patient; and (2) notified that he or she may decline to receive medical services by telemedicine and may withdraw from such care at any time.        Patient is to have h-scope with ablation tomorrow for AUB. Please see last office note for history of this issue. In short, she has a long history of procedures and hormonal treatments for endometriosis that have caused her to have severe anxiety over more treatments  She feels well today, but is anxious about anesthesia etc  Had another heavy bleeding episode over the weekend, but lasted only 2 days and resolved without the lysteda  Final Pathologic Diagnosis 1. Endometrium, biopsy:   - Proliferative endometrium   - Negative for hyperplasia, atypia, and malignancy    FINDINGS:  Uterus:     Size: 10.1 x 5.4 x 5.2 cm     Masses: There is a 1.1 cm intramural fibroid in the posterior uterine body.  No submucosal fibroids.  Nabothian cysts are present.     Endometrium: Normal in this pre menopausal patient, measuring 9 mm.     Right ovary:     Size: 4.2 x 3.4 x 3.0 cm     Appearance: There is a 1.8 cm cystic lesion with peripheral hyperemia, likely a corpus luteum cyst.     Vascular flow: Normal.     Left ovary:     Not visualized.     Free Fluid:     None.     Impression:     Small intramural fibroid.     Right ovarian corpus luteum cyst.        - Medical clearance required: No  - Case request placed & pre-op orders completed  - Anticoagulation : Patient is not on antiocoagulation.   - COUNSELING: Indications, risks, benefits, alternatives  discussed. Pt states her understanding and wishes to proceed withe the procedure.  Informed patient of possible complications including bleeding, infection, the potential for injury to bowel, bladder, blood vessels, nerves, and ureters as they run through the pelvis. The possible need for a blood transfusion discussed. The potential of uterine or cervical cancer on permanent pathology specimen and need for another procedure was discussed. The possible need to do a repair of the ureter, bladder, and/or bowel and the need to open the abdomen was discussed. She indicated she understood the pros, cons and risks of the procedure and elected to have the surgery. Also counseled on post op expectations and precautions   - Pt was also made aware that [facility] is a teaching facility and that learners will be involved in her care. Residents typically assist me with my surgeries and medical students may or may not be present as well. She was made aware that exam under anesthesia usually takes place before the start of any GYN surgery. Pt states her understanding.   - Consents signed in clinic at last appt have not been scanned in yet. Will sign them tomorrow morning  - Pt has been seen by pre op for anesthesia consult    Face to Face time with patient: 15    30 minutes of total time spent on the encounter, which includes face to face time and non-face to face time preparing to see the patient (eg, review of tests), Obtaining and/or reviewing separately obtained history, Documenting clinical information in the electronic or other health record, Independently interpreting results (not separately reported) and communicating results to the patient/family/caregiver, or Care coordination (not separately reported).

## 2023-05-03 ENCOUNTER — ANESTHESIA (OUTPATIENT)
Dept: SURGERY | Facility: OTHER | Age: 43
End: 2023-05-03
Payer: COMMERCIAL

## 2023-05-03 ENCOUNTER — HOSPITAL ENCOUNTER (OUTPATIENT)
Facility: OTHER | Age: 43
Discharge: HOME | End: 2023-05-03
Attending: STUDENT IN AN ORGANIZED HEALTH CARE EDUCATION/TRAINING PROGRAM | Admitting: STUDENT IN AN ORGANIZED HEALTH CARE EDUCATION/TRAINING PROGRAM
Payer: COMMERCIAL

## 2023-05-03 DIAGNOSIS — Z01.818 PREOP TESTING: ICD-10-CM

## 2023-05-03 DIAGNOSIS — N93.9 ABNORMAL UTERINE BLEEDING (AUB): ICD-10-CM

## 2023-05-03 DIAGNOSIS — Z98.890 S/P ENDOMETRIAL ABLATION: Primary | ICD-10-CM

## 2023-05-03 LAB
B-HCG UR QL: NEGATIVE
BASOPHILS # BLD AUTO: 0.04 K/UL (ref 0–0.2)
BASOPHILS NFR BLD: 0.6 % (ref 0–1.9)
CTP QC/QA: YES
DIFFERENTIAL METHOD: ABNORMAL
EOSINOPHIL # BLD AUTO: 0.2 K/UL (ref 0–0.5)
EOSINOPHIL NFR BLD: 2.7 % (ref 0–8)
ERYTHROCYTE [DISTWIDTH] IN BLOOD BY AUTOMATED COUNT: 13.9 % (ref 11.5–14.5)
HCT VFR BLD AUTO: 41.8 % (ref 37–48.5)
HGB BLD-MCNC: 13.1 G/DL (ref 12–16)
IMM GRANULOCYTES # BLD AUTO: 0.02 K/UL (ref 0–0.04)
IMM GRANULOCYTES NFR BLD AUTO: 0.3 % (ref 0–0.5)
LYMPHOCYTES # BLD AUTO: 2.2 K/UL (ref 1–4.8)
LYMPHOCYTES NFR BLD: 34.5 % (ref 18–48)
MCH RBC QN AUTO: 28.5 PG (ref 27–31)
MCHC RBC AUTO-ENTMCNC: 31.3 G/DL (ref 32–36)
MCV RBC AUTO: 91 FL (ref 82–98)
MONOCYTES # BLD AUTO: 0.5 K/UL (ref 0.3–1)
MONOCYTES NFR BLD: 7.9 % (ref 4–15)
NEUTROPHILS # BLD AUTO: 3.4 K/UL (ref 1.8–7.7)
NEUTROPHILS NFR BLD: 54 % (ref 38–73)
NRBC BLD-RTO: 0 /100 WBC
PLATELET # BLD AUTO: 237 K/UL (ref 150–450)
PMV BLD AUTO: 11.1 FL (ref 9.2–12.9)
RBC # BLD AUTO: 4.6 M/UL (ref 4–5.4)
WBC # BLD AUTO: 6.31 K/UL (ref 3.9–12.7)

## 2023-05-03 PROCEDURE — 63600175 PHARM REV CODE 636 W HCPCS: Performed by: STUDENT IN AN ORGANIZED HEALTH CARE EDUCATION/TRAINING PROGRAM

## 2023-05-03 PROCEDURE — D9220A PRA ANESTHESIA: ICD-10-PCS | Mod: CRNA,,, | Performed by: STUDENT IN AN ORGANIZED HEALTH CARE EDUCATION/TRAINING PROGRAM

## 2023-05-03 PROCEDURE — 71000033 HC RECOVERY, INTIAL HOUR: Performed by: STUDENT IN AN ORGANIZED HEALTH CARE EDUCATION/TRAINING PROGRAM

## 2023-05-03 PROCEDURE — 58563 PR HYSTEROSCOPY,W/ENDOMETRIAL ABLATION: ICD-10-PCS | Mod: ,,, | Performed by: STUDENT IN AN ORGANIZED HEALTH CARE EDUCATION/TRAINING PROGRAM

## 2023-05-03 PROCEDURE — 85025 COMPLETE CBC W/AUTO DIFF WBC: CPT | Performed by: STUDENT IN AN ORGANIZED HEALTH CARE EDUCATION/TRAINING PROGRAM

## 2023-05-03 PROCEDURE — 36000707: Performed by: STUDENT IN AN ORGANIZED HEALTH CARE EDUCATION/TRAINING PROGRAM

## 2023-05-03 PROCEDURE — 81025 URINE PREGNANCY TEST: CPT | Performed by: ANESTHESIOLOGY

## 2023-05-03 PROCEDURE — 36000706: Performed by: STUDENT IN AN ORGANIZED HEALTH CARE EDUCATION/TRAINING PROGRAM

## 2023-05-03 PROCEDURE — D9220A PRA ANESTHESIA: Mod: CRNA,,, | Performed by: STUDENT IN AN ORGANIZED HEALTH CARE EDUCATION/TRAINING PROGRAM

## 2023-05-03 PROCEDURE — 71000016 HC POSTOP RECOV ADDL HR: Performed by: STUDENT IN AN ORGANIZED HEALTH CARE EDUCATION/TRAINING PROGRAM

## 2023-05-03 PROCEDURE — 27201423 OPTIME MED/SURG SUP & DEVICES STERILE SUPPLY: Performed by: STUDENT IN AN ORGANIZED HEALTH CARE EDUCATION/TRAINING PROGRAM

## 2023-05-03 PROCEDURE — 36415 COLL VENOUS BLD VENIPUNCTURE: CPT | Performed by: STUDENT IN AN ORGANIZED HEALTH CARE EDUCATION/TRAINING PROGRAM

## 2023-05-03 PROCEDURE — D9220A PRA ANESTHESIA: Mod: ANES,,, | Performed by: ANESTHESIOLOGY

## 2023-05-03 PROCEDURE — 71000015 HC POSTOP RECOV 1ST HR: Performed by: STUDENT IN AN ORGANIZED HEALTH CARE EDUCATION/TRAINING PROGRAM

## 2023-05-03 PROCEDURE — 63600175 PHARM REV CODE 636 W HCPCS: Performed by: ANESTHESIOLOGY

## 2023-05-03 PROCEDURE — 37000009 HC ANESTHESIA EA ADD 15 MINS: Performed by: STUDENT IN AN ORGANIZED HEALTH CARE EDUCATION/TRAINING PROGRAM

## 2023-05-03 PROCEDURE — 25000242 PHARM REV CODE 250 ALT 637 W/ HCPCS

## 2023-05-03 PROCEDURE — 58563 HYSTEROSCOPY ABLATION: CPT | Mod: ,,, | Performed by: STUDENT IN AN ORGANIZED HEALTH CARE EDUCATION/TRAINING PROGRAM

## 2023-05-03 PROCEDURE — 37000008 HC ANESTHESIA 1ST 15 MINUTES: Performed by: STUDENT IN AN ORGANIZED HEALTH CARE EDUCATION/TRAINING PROGRAM

## 2023-05-03 PROCEDURE — 25000003 PHARM REV CODE 250: Performed by: STUDENT IN AN ORGANIZED HEALTH CARE EDUCATION/TRAINING PROGRAM

## 2023-05-03 PROCEDURE — D9220A PRA ANESTHESIA: ICD-10-PCS | Mod: ANES,,, | Performed by: ANESTHESIOLOGY

## 2023-05-03 RX ORDER — FENTANYL CITRATE 50 UG/ML
INJECTION, SOLUTION INTRAMUSCULAR; INTRAVENOUS
Status: DISCONTINUED | OUTPATIENT
Start: 2023-05-03 | End: 2023-05-03

## 2023-05-03 RX ORDER — HYDROMORPHONE HYDROCHLORIDE 2 MG/ML
0.4 INJECTION, SOLUTION INTRAMUSCULAR; INTRAVENOUS; SUBCUTANEOUS EVERY 5 MIN PRN
Status: DISCONTINUED | OUTPATIENT
Start: 2023-05-03 | End: 2023-05-03 | Stop reason: SDUPTHER

## 2023-05-03 RX ORDER — HYDROMORPHONE HYDROCHLORIDE 2 MG/ML
0.4 INJECTION, SOLUTION INTRAMUSCULAR; INTRAVENOUS; SUBCUTANEOUS EVERY 5 MIN PRN
Status: DISCONTINUED | OUTPATIENT
Start: 2023-05-03 | End: 2023-05-03 | Stop reason: HOSPADM

## 2023-05-03 RX ORDER — ONDANSETRON 2 MG/ML
INJECTION INTRAMUSCULAR; INTRAVENOUS
Status: DISCONTINUED | OUTPATIENT
Start: 2023-05-03 | End: 2023-05-03

## 2023-05-03 RX ORDER — DEXAMETHASONE SODIUM PHOSPHATE 4 MG/ML
INJECTION, SOLUTION INTRA-ARTICULAR; INTRALESIONAL; INTRAMUSCULAR; INTRAVENOUS; SOFT TISSUE
Status: DISCONTINUED | OUTPATIENT
Start: 2023-05-03 | End: 2023-05-03

## 2023-05-03 RX ORDER — ALBUTEROL SULFATE 2.5 MG/.5ML
2.5 SOLUTION RESPIRATORY (INHALATION) EVERY 4 HOURS PRN
Status: DISCONTINUED | OUTPATIENT
Start: 2023-05-03 | End: 2023-05-03 | Stop reason: HOSPADM

## 2023-05-03 RX ORDER — LIDOCAINE HYDROCHLORIDE 20 MG/ML
INJECTION INTRAVENOUS
Status: DISCONTINUED | OUTPATIENT
Start: 2023-05-03 | End: 2023-05-03

## 2023-05-03 RX ORDER — ALBUTEROL SULFATE 2.5 MG/.5ML
SOLUTION RESPIRATORY (INHALATION)
Status: COMPLETED
Start: 2023-05-03 | End: 2023-05-03

## 2023-05-03 RX ORDER — MEPERIDINE HYDROCHLORIDE 25 MG/ML
12.5 INJECTION INTRAMUSCULAR; INTRAVENOUS; SUBCUTANEOUS ONCE AS NEEDED
Status: DISCONTINUED | OUTPATIENT
Start: 2023-05-03 | End: 2023-05-03 | Stop reason: HOSPADM

## 2023-05-03 RX ORDER — IBUPROFEN 600 MG/1
600 TABLET ORAL EVERY 6 HOURS PRN
Qty: 30 TABLET | Refills: 1 | Status: SHIPPED | OUTPATIENT
Start: 2023-05-03 | End: 2023-10-03

## 2023-05-03 RX ORDER — SODIUM CHLORIDE 9 MG/ML
INJECTION, SOLUTION INTRAVENOUS CONTINUOUS
Status: DISCONTINUED | OUTPATIENT
Start: 2023-05-03 | End: 2023-05-03 | Stop reason: HOSPADM

## 2023-05-03 RX ORDER — MEPERIDINE HYDROCHLORIDE 25 MG/ML
12.5 INJECTION INTRAMUSCULAR; INTRAVENOUS; SUBCUTANEOUS ONCE AS NEEDED
Status: DISCONTINUED | OUTPATIENT
Start: 2023-05-03 | End: 2023-05-03 | Stop reason: SDUPTHER

## 2023-05-03 RX ORDER — DEXTROMETHORPHAN HYDROBROMIDE, GUAIFENESIN 5; 100 MG/5ML; MG/5ML
650 LIQUID ORAL EVERY 6 HOURS PRN
Qty: 30 TABLET | Refills: 1 | Status: SHIPPED | OUTPATIENT
Start: 2023-05-03 | End: 2023-10-03

## 2023-05-03 RX ORDER — SODIUM CHLORIDE, SODIUM LACTATE, POTASSIUM CHLORIDE, CALCIUM CHLORIDE 600; 310; 30; 20 MG/100ML; MG/100ML; MG/100ML; MG/100ML
INJECTION, SOLUTION INTRAVENOUS CONTINUOUS
Status: DISCONTINUED | OUTPATIENT
Start: 2023-05-03 | End: 2023-05-03 | Stop reason: HOSPADM

## 2023-05-03 RX ORDER — PROCHLORPERAZINE EDISYLATE 5 MG/ML
5 INJECTION INTRAMUSCULAR; INTRAVENOUS EVERY 30 MIN PRN
Status: DISCONTINUED | OUTPATIENT
Start: 2023-05-03 | End: 2023-05-03 | Stop reason: SDUPTHER

## 2023-05-03 RX ORDER — PROPOFOL 10 MG/ML
VIAL (ML) INTRAVENOUS
Status: DISCONTINUED | OUTPATIENT
Start: 2023-05-03 | End: 2023-05-03

## 2023-05-03 RX ORDER — PROCHLORPERAZINE EDISYLATE 5 MG/ML
5 INJECTION INTRAMUSCULAR; INTRAVENOUS EVERY 30 MIN PRN
Status: DISCONTINUED | OUTPATIENT
Start: 2023-05-03 | End: 2023-05-03 | Stop reason: HOSPADM

## 2023-05-03 RX ORDER — OXYCODONE HYDROCHLORIDE 5 MG/1
5 TABLET ORAL
Status: DISCONTINUED | OUTPATIENT
Start: 2023-05-03 | End: 2023-05-03 | Stop reason: HOSPADM

## 2023-05-03 RX ORDER — MIDAZOLAM HYDROCHLORIDE 1 MG/ML
INJECTION INTRAMUSCULAR; INTRAVENOUS
Status: DISCONTINUED | OUTPATIENT
Start: 2023-05-03 | End: 2023-05-03

## 2023-05-03 RX ORDER — SODIUM CHLORIDE 0.9 % (FLUSH) 0.9 %
3 SYRINGE (ML) INJECTION
Status: DISCONTINUED | OUTPATIENT
Start: 2023-05-03 | End: 2023-05-03 | Stop reason: HOSPADM

## 2023-05-03 RX ORDER — OXYCODONE HYDROCHLORIDE 5 MG/1
5 TABLET ORAL
Status: DISCONTINUED | OUTPATIENT
Start: 2023-05-03 | End: 2023-05-03 | Stop reason: SDUPTHER

## 2023-05-03 RX ORDER — SODIUM CHLORIDE 0.9 % (FLUSH) 0.9 %
3 SYRINGE (ML) INJECTION
Status: DISCONTINUED | OUTPATIENT
Start: 2023-05-03 | End: 2023-05-03 | Stop reason: SDUPTHER

## 2023-05-03 RX ADMIN — PROPOFOL 200 MG: 10 INJECTION, EMULSION INTRAVENOUS at 09:05

## 2023-05-03 RX ADMIN — SODIUM CHLORIDE, SODIUM LACTATE, POTASSIUM CHLORIDE, AND CALCIUM CHLORIDE: 600; 310; 30; 20 INJECTION, SOLUTION INTRAVENOUS at 09:05

## 2023-05-03 RX ADMIN — DEXAMETHASONE SODIUM PHOSPHATE 4 MG: 4 INJECTION, SOLUTION INTRAMUSCULAR; INTRAVENOUS at 09:05

## 2023-05-03 RX ADMIN — FENTANYL CITRATE 100 MCG: 50 INJECTION, SOLUTION INTRAMUSCULAR; INTRAVENOUS at 09:05

## 2023-05-03 RX ADMIN — ONDANSETRON HYDROCHLORIDE 4 MG: 2 INJECTION INTRAMUSCULAR; INTRAVENOUS at 09:05

## 2023-05-03 RX ADMIN — MIDAZOLAM HYDROCHLORIDE 2 MG: 1 INJECTION, SOLUTION INTRAMUSCULAR; INTRAVENOUS at 09:05

## 2023-05-03 RX ADMIN — ALBUTEROL SULFATE 2.5 MG: 2.5 SOLUTION RESPIRATORY (INHALATION) at 10:05

## 2023-05-03 RX ADMIN — LIDOCAINE HYDROCHLORIDE 80 MG: 20 INJECTION, SOLUTION INTRAVENOUS at 09:05

## 2023-05-03 NOTE — OP NOTE
Operative Report     Date of Surgery:  05/03/2023     Surgeon: Vicki Mosley MD     Assistant: Dailla Iverson MD - PGY3     Pre-Op Diagnosis:   1. AUB    Post-op Diagnosis:  same    Procedure:  1. Hysteroscopy  2. Endometrial ablation     Findings:   FINDINGS:   1. Normal appearing external genitalia.  2. Normal appearing vaginal and cervical mucosa, free of lesions.  3. Single-toothed tenaculum applied to the anterior lip of the cervix  4. Uterus sounded to 10.5 cm, Uterine cavity length 4.5 cm, Uterine width 3.6 cm.  5. Hysteroscopically, normal endometrium.  6. NovaSure inserted, seal tested, electricity applied at power 89 for 1:54. NovaSure removed.   7. Hysteroscopically, no damage to the endocervical canal, uniform ablation to the uterine cavity; no evidence of perforation.  8. Tenaculum removed, tenaculum puncture sites with mild bleeding well-controlled after applying pressure with the sponge stick.    EBL: <5cc      IVF: see anesthesia report     Urine Output: 50 cc    NOVASURE FLUID IN: 220 cc  NOVASURE FLUID DEFICIT: 90 cc   NOVASURE CAVITY MEASUREMENTS: 4.5 cm (L) x 3.6 cm (W)  NOVASURE POWER: 89  NOVASURE TIME: 1:54      Specimen: none     Procedure in Detail:  The patient was taken to the Operating Room where general anesthesia was obtained, the patient was placed in the lithotomy position, with her legs in the Yellow fin stirrups. The patient was then prepped and draped in the normal sterile fashion and her bladder was drained using in-and-out catheterization. The right angle was placed in the posterior aspect of the vagina and the right angle retractor was placed in the anterior aspect of the vagina.  The cervix was visualized and a single-tooth tenaculum was placed on the anterior aspect of the cervix. The uterus sounded to 10.5 cm. The cervix was dilated to allow introduction of the 5.5mm hysteroscope. The hysteroscope was then white balanced and the line cleared of air. The hysteroscope was  then inserted into the cervical os and the uterine cavity was directly visualized, as well as both ostia .  The endometrium appeared normal.     The Novasure system was placed into the endometrial cavity, the width was measured, and the device was removed from the uterus. The cavity width and length were then applied to the Novasure system.  The Novasure was then placed back into the endometrial cavity to the uterine fundus, pulled back 0.5 cm from the fundus, and array was then deployed.  The cavity assessment was performed and was passed. The ablation was then conducted. Following removal of the Novasure device, the cavity was again inspected with a thorough burn noted. The cervix was not burned. The single tooth tenaculum was then removed and the cervix was noted to be hemostatic.     Sponge, lap,and instrument counts were correct x 2.  The patient tolerated the procedure well and was taken to recovery in stable condition.      Dalila Iverson MD/MPH  OB/GYN PGY3

## 2023-05-03 NOTE — PLAN OF CARE
Basilia Delgado has met all discharge criteria from Phase II. Vital Signs are stable, ambulating  without difficulty. Discharge instructions given, patient verbalized understanding. Discharged from facility via wheelchair in stable condition.

## 2023-05-03 NOTE — DISCHARGE INSTRUCTIONS

## 2023-05-03 NOTE — DISCHARGE SUMMARY
[facility] ([place])  Brief Operative Note     SUMMARY     Surgery Date: 5/3/2023     Surgeon(s) and Role:     * Vicki Mosley MD - Primary    Assisting Surgeon: Dalila Iverson MD - PGY3    Pre-op Diagnosis:  Abnormal uterine bleeding (AUB) [N93.9]    Post-op Diagnosis:  Post-Op Diagnosis Codes:     * Abnormal uterine bleeding (AUB) [N93.9]    Procedure(s) (LRB):  ABLATION, ENDOMETRIUM (N/A)    Anesthesia: Choice    Findings/Key Components:   1. Normal appearing external genitalia.  2. Normal appearing vaginal and cervical mucosa, free of lesions.  3. Single-toothed tenaculum applied to the anterior lip of the cervix  4. Uterus sounded to 10.5 cm, Uterine cavity length 4.5 cm, Uterine width 3.6 cm.  5. Hysteroscopically, normal endometrium.  6. NovaSure inserted, seal tested, electricity applied at power 89 for 1:54. NovaSure removed.   7. Hysteroscopically, no damage to the endocervical canal, uniform ablation to the uterine cavity; no evidence of perforation.  8. Tenaculum removed, tenaculum puncture sites with mild bleeding well-controlled after applying pressure with the sponge stick.    Estimated Blood Loss: minimal, <10cc    IVF: see anesthesia report    UOP: 50cc    Complications: none         Specimens:   Specimen (24h ago, onward)      None            Discharge Note    SUMMARY     Admit Date: 5/3/2023    Discharge Date:  05/03/2023 10:36 AM    Hospital Course (synopsis of major diagnoses, care, treatment, and services provided during the course of the hospital stay): Patient was admitted for the above mentioned procedure.  Procedure was performed without difficulty.  Please see operative report for further details.  She was transferred to recovery in stable condition and discharged home the same day.      Final Diagnosis: Post-Op Diagnosis Codes:     * Abnormal uterine bleeding (AUB) [N93.9]    Disposition: Home or Self Care    Follow Up/Patient Instructions: see  below    Medications:  Reconciled Home Medications:      Medication List        START taking these medications      acetaminophen 650 MG Tbsr  Commonly known as: TYLENOL  Take 1 tablet (650 mg total) by mouth every 6 (six) hours as needed.     ibuprofen 600 MG tablet  Commonly known as: ADVIL,MOTRIN  Take 1 tablet (600 mg total) by mouth every 6 (six) hours as needed for Pain.            CONTINUE taking these medications      albuterol 90 mcg/actuation inhaler  Commonly known as: PROVENTIL/VENTOLIN HFA  Inhale 1-2 puffs into the lungs every 6 (six) hours as needed for Wheezing. Rescue            Discharge Procedure Orders   Diet general     Lifting restrictions   Order Comments: LIFTING:  No lifting greater than 15 pounds for six weeks.     PELVIC REST:  No douching, tampons, or intercourse for 6 weeks.  If prescribed, vaginal estrogen cream may be used during the postoperative period.     Other restrictions (specify):   Order Comments: DRIVING:  No driving while on narcotics. Driving may be resumed initially with a competent passenger one to two weeks after surgery if no longer taking narcotics.     EXERCISE:  For six weeks your exercise should be limited to walking. You may walk as far as you wish, as long as you increase your level of exertion gradually and avoid slippery surfaces. You may climb stairs as needed to get around, but should not use stair climbing for exercise.     Leave dressing on - Keep it clean, dry, and intact until clinic visit     Remove dressing in 24 hours   Order Comments: If you have a bandage on wound, you may remove it the day after dismissal.  If you had steri-strips remove them once they begin to peel off (usually 2 weeks). Keep incision clean and dry.  Inspect the incision daily for signs and symptoms of infection.     Call MD for:  temperature >100.4     Call MD for:  persistent nausea and vomiting     Call MD for:  severe uncontrolled pain     Call MD for:  difficulty breathing,  headache or visual disturbances     Call MD for:  redness, tenderness, or signs of infection (pain, swelling, redness, odor or green/yellow discharge around incision site)     Call MD for:  hives     Call MD for:   Order Comments: inability to void,urine is ketchup colored or you have large clots, vaginal bleeding is heavier than a period.    VAGINAL DISCHARGE: You may develop a vaginal discharge and intermittent vaginal spotting after surgery and up to 6 weeks postoperatively.  The discharge may have an odor and may change in color but it is normal.  This is due to dissolving stiches.  Contact your surgical team if you develop vaginal or vulvar irritation along with a discharge.  Also contact your surgical team if you have vaginal discharge that smells like urine or stool.    PAIN MEDICATIONS:     Take your pain medications as instructed. It is best to take pain medications before your pain becomes severe. This will allow you to take less medication yet have better pain relief. For the first 2 or 3 days it may be helpful to take your pain medications on a regular schedule (e.g. every 4 to 6 hours). This will help you to keep your pain under better control. You should then begin to take fewer medications each day until you no longer need them. Do not take pain medication on an empty stomach. This may lead to nausea and vomiting.    CONSTIPATION REMEDIES: Patients are often constipated after surgery or with use of oral narcotic medicine. You should continue to take the stool softener, Senokot-S during the next six weeks, and consume adequate amounts of water.  If you have not had a bowel movement for 3 days after dismissal, or are uncomfortable and unable to pass stool, please try one or all of the following measures:  1.  Milk of Magnesia - 30 cc by mouth every 12 hours   2.  Dulcolax suppository - One suppository per rectum every 4-6 hours   3.  Metamucil, Fibercon or other bulk former - use as directed  4.  Fleets  Enema  5.  Prunes or Prune juice    If you continue to have constipation after trying the above remedies, you should contact your surgical team using the contact information listed above     Activity as tolerated   Order Comments: Return to normal activity slowly as you feel able.  For 6 weeks your exercise should be limited to walking.  You may walk as far as you wish, as long as you increase your level of exertion gradually and avoid slippery surfaces.     Shower on day dressing removed (No bath)   Order Comments: You may shower at any time but should avoid immersing any abdominal incisions in water for at least 2 weeks after surgery or until the wound is completely healed.  If given, please shower with Hibaclens soap until bottle is completely finish      Follow-up Information       Vicki Mosley MD. Schedule an appointment as soon as possible for a visit in 6 week(s).    Specialty: Obstetrics and Gynecology  Why: Postoperative visit  Contact information:  [address] [address]  [phone]                           Dalila Iverson MD/MPH  OB/GYN PGY3

## 2023-05-03 NOTE — ANESTHESIA POSTPROCEDURE EVALUATION
Anesthesia Post Evaluation    Patient: Basilia Austin Ural    Procedure(s) Performed: Procedure(s) (LRB):  ABLATION, ENDOMETRIUM (N/A)    Final Anesthesia Type: general      Patient location during evaluation: PACU  Patient participation: Yes- Able to Participate  Level of consciousness: awake and alert  Post-procedure vital signs: reviewed and stable  Pain management: adequate  Airway patency: patent    PONV status at discharge: No PONV  Anesthetic complications: no      Cardiovascular status: blood pressure returned to baseline and stable  Respiratory status: room air  Hydration status: euvolemic  Follow-up not needed.          Vitals Value Taken Time   /70 05/03/23 1130   Temp 36.7 °C (98.1 °F) 05/03/23 1100   Pulse 80 05/03/23 1130   Resp 16 05/03/23 1130   SpO2 100 % 05/03/23 1130         Event Time   Out of Recovery 10:53:00         Pain/Renny Score: Renny Score: 10 (5/3/2023 11:30 AM)

## 2023-05-03 NOTE — INTERVAL H&P NOTE
The patient has been examined and the H&P has been reviewed:    I concur with the findings and no changes have occurred since H&P was written.    Surgery risks, benefits and alternative options discussed and understood by patient/family. All questions answered and concerns addressed. To OR for planned procedure.    Dalila Iverson MD/MPH  OB/GYN PGY3            There are no hospital problems to display for this patient.

## 2023-05-03 NOTE — ANESTHESIA PROCEDURE NOTES
Intubation    Date/Time: 5/3/2023 9:55 AM  Performed by: Damaso Black CRNA  Authorized by: Chan Domingo MD     Intubation:     Induction:  Intravenous    Intubated:  Postinduction    Mask Ventilation:  N/a    Attempts:  1    Attempted By:  CRNA    Difficult Airway Encountered?: No      Complications:  None    Airway Device:  Supraglottic airway/LMA    Airway Device Size:  4.0    Placement Verified By:  Capnometry    Complicating Factors:  None    Findings Post-Intubation:  BS equal bilateral and atraumatic/condition of teeth unchanged

## 2023-05-03 NOTE — TRANSFER OF CARE
Anesthesia Transfer of Care Note    Patient: Basilia Grimesret Ural    Procedure(s) Performed: Procedure(s) (LRB):  ABLATION, ENDOMETRIUM (N/A)    Patient location: PACU    Anesthesia Type: general    Transport from OR: Transported from OR on 2-3 L/min O2 by NC with adequate spontaneous ventilation    Post pain: adequate analgesia    Post assessment: no apparent anesthetic complications and tolerated procedure well    Post vital signs: stable    Level of consciousness: responds to stimulation    Nausea/Vomiting: no nausea/vomiting    Complications: none    Transfer of care protocol was followed      Last vitals:   Visit Vitals  BP (!) 153/94   Pulse 85   Temp 37.1 °C (98.8 °F) (Oral)   Resp 16   LMP 04/29/2023   SpO2 100%   Breastfeeding No

## 2023-05-04 VITALS
SYSTOLIC BLOOD PRESSURE: 120 MMHG | DIASTOLIC BLOOD PRESSURE: 70 MMHG | HEART RATE: 80 BPM | TEMPERATURE: 98 F | RESPIRATION RATE: 16 BRPM | OXYGEN SATURATION: 100 %

## 2023-05-09 NOTE — CHAPLAIN
05/03/23 1157   Clinical Encounter Type   Visit Type Initial Visit   Visit Category Post-Op   Visited With Patient and family together;Health care provider  (Two Relatives were present for the Spiritual Care  visit.)   Number of Family Visited 2   Length of Visit 15 Minutes   Continue Visiting No   Mormonism Encounters   Spiritual Resources Requested Prayer   Patient Spiritual Encounters   Care Provided Compassionate presence;Prayer support;Reflective listening;Life review/ reflection;Explored pt/family concerns   Patient Coping Accepting;Open/discussion;Internal strength;Family/ friends resources   Comments - Patient Thankful for the Spiritual care  visit.   Family Spiritual Encounters   Care Provided Compassionate presence;Reflective listening;Explored pt/family concerns   Family Coping Accepting;Open/discussion;Family/ friends resources   Comments - Family Thankful for the Spiritual Care  visit.

## 2023-05-11 ENCOUNTER — OFFICE VISIT (OUTPATIENT)
Dept: OBSTETRICS AND GYNECOLOGY | Facility: CLINIC | Age: 43
End: 2023-05-11
Payer: COMMERCIAL

## 2023-05-11 VITALS
WEIGHT: 245.56 LBS | HEIGHT: 65 IN | BODY MASS INDEX: 40.91 KG/M2 | DIASTOLIC BLOOD PRESSURE: 70 MMHG | SYSTOLIC BLOOD PRESSURE: 101 MMHG

## 2023-05-11 DIAGNOSIS — Z98.890 S/P ENDOMETRIAL ABLATION: Primary | ICD-10-CM

## 2023-05-11 PROCEDURE — 3008F BODY MASS INDEX DOCD: CPT | Mod: CPTII,S$GLB,, | Performed by: STUDENT IN AN ORGANIZED HEALTH CARE EDUCATION/TRAINING PROGRAM

## 2023-05-11 PROCEDURE — 3074F PR MOST RECENT SYSTOLIC BLOOD PRESSURE < 130 MM HG: ICD-10-PCS | Mod: CPTII,S$GLB,, | Performed by: STUDENT IN AN ORGANIZED HEALTH CARE EDUCATION/TRAINING PROGRAM

## 2023-05-11 PROCEDURE — 1159F PR MEDICATION LIST DOCUMENTED IN MEDICAL RECORD: ICD-10-PCS | Mod: CPTII,S$GLB,, | Performed by: STUDENT IN AN ORGANIZED HEALTH CARE EDUCATION/TRAINING PROGRAM

## 2023-05-11 PROCEDURE — 3074F SYST BP LT 130 MM HG: CPT | Mod: CPTII,S$GLB,, | Performed by: STUDENT IN AN ORGANIZED HEALTH CARE EDUCATION/TRAINING PROGRAM

## 2023-05-11 PROCEDURE — 1159F MED LIST DOCD IN RCRD: CPT | Mod: CPTII,S$GLB,, | Performed by: STUDENT IN AN ORGANIZED HEALTH CARE EDUCATION/TRAINING PROGRAM

## 2023-05-11 PROCEDURE — 3008F PR BODY MASS INDEX (BMI) DOCUMENTED: ICD-10-PCS | Mod: CPTII,S$GLB,, | Performed by: STUDENT IN AN ORGANIZED HEALTH CARE EDUCATION/TRAINING PROGRAM

## 2023-05-11 PROCEDURE — 99024 POSTOP FOLLOW-UP VISIT: CPT | Mod: S$GLB,,, | Performed by: STUDENT IN AN ORGANIZED HEALTH CARE EDUCATION/TRAINING PROGRAM

## 2023-05-11 PROCEDURE — 3078F DIAST BP <80 MM HG: CPT | Mod: CPTII,S$GLB,, | Performed by: STUDENT IN AN ORGANIZED HEALTH CARE EDUCATION/TRAINING PROGRAM

## 2023-05-11 PROCEDURE — 99024 PR POST-OP FOLLOW-UP VISIT: ICD-10-PCS | Mod: S$GLB,,, | Performed by: STUDENT IN AN ORGANIZED HEALTH CARE EDUCATION/TRAINING PROGRAM

## 2023-05-11 PROCEDURE — 3078F PR MOST RECENT DIASTOLIC BLOOD PRESSURE < 80 MM HG: ICD-10-PCS | Mod: CPTII,S$GLB,, | Performed by: STUDENT IN AN ORGANIZED HEALTH CARE EDUCATION/TRAINING PROGRAM

## 2023-05-11 NOTE — PROGRESS NOTES
History & Physical  Gynecology      SUBJECTIVE:     Chief Complaint: No chief complaint on file.       History of Present Illness:    Pt is here today for her post op visit. She underwent uterine hysteroscopy with ablation on 5/3 for the indication of AUB. She has had an uncomplicated post operative course thus far. She has been eating normally, no N/V. Normal bladder and bowel function. Had minimal bleeding right after the surgery, but no longer bleeding. She is not requiring any pain medications. She has not had sex since surgery. Has some back pain and urinary frequency -but has been hydrating more. Some watery discharge. ROS otherwise negative.        Review of patient's allergies indicates:   Allergen Reactions    Cephalosporins Anaphylaxis, Shortness Of Breath and Other (See Comments)    Pcn [penicillins]        Past Medical History:   Diagnosis Date    Asthma      Past Surgical History:   Procedure Laterality Date    ADENOIDECTOMY      APPENDECTOMY      CHOLECYSTECTOMY      ENDOMETRIAL ABLATION N/A 5/3/2023    Procedure: ABLATION, ENDOMETRIUM;  Surgeon: Vicki Mosley MD;  Location: [facility];  Service: OB/GYN;  Laterality: N/A;    LAPAROSCOPY      x 5    LAPAROTOMY      x 1    TONSILLECTOMY       OB History          2    Para   2    Term   2            AB        Living             SAB        IAB        Ectopic        Multiple        Live Births                   Family History   Problem Relation Age of Onset    Asthma Mother     Thyroid cancer Mother     Alcohol abuse Father     Drug abuse Brother     Lung cancer Maternal Grandmother     Prostate cancer Maternal Grandfather     Kidney cancer Paternal Grandmother     Alcohol abuse Paternal Grandfather     Skin cancer Paternal Grandfather      Social History     Tobacco Use    Smoking status: Never   Substance Use Topics    Alcohol use: Never    Drug use: Never       Current Outpatient Medications   Medication Sig     acetaminophen (TYLENOL) 650 MG TbSR Take 1 tablet (650 mg total) by mouth every 6 (six) hours as needed.    albuterol (PROVENTIL/VENTOLIN HFA) 90 mcg/actuation inhaler Inhale 1-2 puffs into the lungs every 6 (six) hours as needed for Wheezing. Rescue    ibuprofen (ADVIL,MOTRIN) 600 MG tablet Take 1 tablet (600 mg total) by mouth every 6 (six) hours as needed for Pain.     No current facility-administered medications for this visit.         Review of Systems:  Review of Systems   Constitutional:  Negative for activity change, appetite change, chills and fever.   Respiratory:  Negative for shortness of breath.    Cardiovascular:  Negative for chest pain.   Gastrointestinal:  Negative for abdominal pain, blood in stool, constipation, diarrhea, nausea and vomiting.   Genitourinary:  Negative for dysuria, hematuria, pelvic pain, vaginal bleeding, vaginal discharge and vaginal pain.   Musculoskeletal:  Positive for back pain.      OBJECTIVE:     Physical Exam:  Physical Exam  Vitals reviewed.   Constitutional:       General: She is not in acute distress.     Appearance: She is well-developed. She is not diaphoretic.   HENT:      Head: Normocephalic and atraumatic.   Eyes:      Conjunctiva/sclera: Conjunctivae normal.   Cardiovascular:      Rate and Rhythm: Normal rate.   Pulmonary:      Effort: Pulmonary effort is normal.   Musculoskeletal:         General: Normal range of motion.      Cervical back: Normal range of motion.   Skin:     General: Skin is warm and dry.   Neurological:      Mental Status: She is alert and oriented to person, place, and time.         ASSESSMENT:       ICD-10-CM ICD-9-CM    1. S/P endometrial ablation  Z98.890 V45.89              Plan:        - Pt healing well . Will progress with time.  - Pathology and surgical findings reviewed with the patient. Reviewed expectations for ovulation and menses moving forward  - Post op exam benign today.  - Pt cleared for all activity.  - RTC for annual or  PRN.     Counseling time: 15 minutes    Vicki Mosley

## 2023-05-16 ENCOUNTER — PATIENT MESSAGE (OUTPATIENT)
Dept: REHABILITATION | Facility: HOSPITAL | Age: 43
End: 2023-05-16

## 2023-05-16 ENCOUNTER — CLINICAL SUPPORT (OUTPATIENT)
Dept: REHABILITATION | Facility: HOSPITAL | Age: 43
End: 2023-05-16
Payer: COMMERCIAL

## 2023-05-16 DIAGNOSIS — M62.89 PELVIC FLOOR DYSFUNCTION: ICD-10-CM

## 2023-05-16 DIAGNOSIS — R27.8 OTHER LACK OF COORDINATION: ICD-10-CM

## 2023-05-16 DIAGNOSIS — R53.1 WEAKNESS: Primary | ICD-10-CM

## 2023-05-16 PROCEDURE — 97530 THERAPEUTIC ACTIVITIES: CPT | Mod: PO

## 2023-05-16 PROCEDURE — 97110 THERAPEUTIC EXERCISES: CPT | Mod: PO

## 2023-05-16 PROCEDURE — 97112 NEUROMUSCULAR REEDUCATION: CPT | Mod: PO

## 2023-05-16 NOTE — PROGRESS NOTES
"       Pelvic Health Physical Therapy   Treatment Note     Name: Basilia Austin Ural  Clinic Number: [id number]    Therapy Diagnosis:   Encounter Diagnoses   Name Primary?    Weakness Yes    Pelvic floor dysfunction     Other lack of coordination        Physician: Leeann Green MD    Visit Date: 5/16/2023    Physician Orders: PT Eval and Treat   Medical Diagnosis from Referral: stress urinary incontinence   Evaluation Date: 10/24/2022  Authorization Period Expiration: 09/30/2023  Plan of Care Expiration: 6-29-23  Progress Note Due: 5-6-2311-24-22  Visit #/Visits authorized: 5/ 20   Cancelled Visits: 0  No Show Visits: 0  FOTO: Issued Vist #:  2/3  12/1/22       Time In:  900  Time Out:  954  Total Billable Time:  54 minutes    Precautions: Standard    Subjective     Pt reports:  She had a hysteroscopy and ablation on May 3rd.  She reports she is still feeling uncomfortable from the procedure.  She has lifting restrictions of no more than 15 pounds and no internal work.    She reports discomfort with a full bladder and full bowels currently.  "I've not had a lot of problems except for late in the day when I really have to go."      She was compliant with home exercise program.  Response to previous treatment: no issues reported  Functional change: less leakage    Pain: none reported  Location: NA    Constitutional Symptoms Review: The patient denies having any constitutional symptoms.     Objective   Pt verbally consents to intravaginal treatment today.  Signed consent form already on file.       VAGINAL PELVIC FLOOR EXAM        INTERNAL ASSESSMENT  Pain: tender areas noted as follows: obturator internus bilaterally    Sensation: able to localized pressure appropriately   Vaginal vault: WNL   Muscle Bulk: slight increase in resting tension   Muscle Power: trace    Comments: improve ability to activate her deep pelvic floor with cues to      Basilia received the following manual therapy techniques: to develop " flexibility, extensibility, and desensitization for 00 minutes including: trigger point/myofascial release of left obturator internus and levator ani intravaginally and scar mobilization of suprapubic scar      Basilia participated in neuromuscular re-education activities to develop Coordination, Control, and Down training for 23 minutes including: pelvic floor relaxation/bulging training  [x] Used tapping intravaginally over muscle bellies to elicit a contraction with kegels. Patient demonstrates a flicker of activation about 80% of the time but improves with practice and verbal cues.  Worked on trying to elicit a contraction in layer 2 to help with urethral closure.     [x] 15 min body scan practicing mindfulness  [x] Diaphragmatic breathing  [x] pelvic floor relaxation/bulging training        Therapeutic Exercise to develop  strength and endurance for 8 minutes including:   Posterior pelvic tilt 5 sec x 10 reps x 2 sets (not today)  Clamshell 5 sec x10 reps bilaterally x 2 sets with green theraband (not today)  Bridge with posterior pelvic tilt 5 sec x 20 reps (not today)  Piriformis stretch 3x30 sec bilaterally   Happy baby stretch 3x30 sec (not today)  Groin stretch 3x30 sec bilaterally   Child's pose 3x30 sec (not today)    Therapeutic Activity to improve dynamic functional performance, coordination and education for 23 minutes. Including: plan of care review.  Edu on anatomy and impact on current level of function.    [x] Pt edu in the Roll for Control technique to decrease incontinence with strong urge.  Practiced new technique in sitting and standing.    [x] Visualization edu and practice   [x] Plan of care review and home exercise program review   [x] Anatomy review and impact on current level of function   [x] Reviewed importance of keeping pelvic floor relaxed to help with pain with activities of daily living.         Home Exercises Provided and Patient Education Provided     Education provided:   -  anatomy/physiology of pelvic floor  Discussed progression of plan of care with patient; educated pt in activity modification; reviewed HEP with pt. Pt demonstrated and verbalized understanding of all instruction and was provided with a handout of HEP (see Patient Instructions).  -     Written Home Exercises Provided: yes.  Exercises were reviewed and Basilia was able to demonstrate them prior to the end of the session.  Basilia demonstrated good  understanding of the education provided.     See EMR under Patient Instructions for exercises provided 12/1/2022.    Assessment   Worked on strategies to help with pain management today including visualization, mindfulness and meditation edu and practice.  Worked on pelvic floor muscle(s) myofascial release of layer 3 as well as suprapubic scar tissue mobilization.  Also worked on hip and pelvic floor muscle(s) relaxation and stretching training.  Edu provided on urge delay strategies to help with stress urinary incontinence. Home exercise program was progressed to include mindfulness and meditation.      Pt will continue to benefit from skilled outpatient physical therapy to address the deficits listed in the problem list box on initial evaluation, provide pt/family education and to maximize pt's level of independence in the home and community environment.     Basilia Is progressing well towards her goals.   Pt prognosis is Excellent.       Pt's spiritual, cultural and educational needs considered and pt agreeable to plan of care and goals.     Anticipated barriers to physical therapy: none    Goals:   Short Term Goals: 4 weeks   Pt to demonstrate an improved score in the FOTO urinary problem survey  to at least 56 to demonstrate improving bladder control with activities of daily living.    Pt to demonstrate being able to correctly and consistently perform a kegel which is needed  to increase pelvic floor muscle coordination and strength needed for continence.  Pt to be able  to delay the urge to urinate at least 5 minutes with a strong urge to urinate in order to make it to the bathroom without leaking.  Pt to voice understanding of the role that diet plays on urinary urgency.    Pt to report a decrease in pain to no more than 4 at it's worst with  activities of daily living with her hip.                Long Term Goals: 12 weeks   Pt to be discharged with home plan for carry over after discharge.    Pt to report an 80% reduction of urinary incontinence symptoms with ADL participation thereby demonstrating improved pelvic floor muscle control and strength.   Pt to demonstrate an improved score in the FOTO urinary problems survey  to at least 60 to demonstrate improving bladder control with activities of daily living.    Pt to be able to delay the urge to urinate at least 10 minutes with a strong urge to urinate in order to make it to the bathroom without leaking.  Pt to increase pelvic floor strength to at least 2/5 to demonstrate improved strength needed for continence with ADLs.   Pt to report a decrease in pain to no more than 2 at it's worst with activities of daily living with her hip.                PLAN   Plan of care Certification: 4-6-23 to 6-29-23     Outpatient Physical Therapy 1-2 times weekly for 12 weeks to include the following interventions: therapeutic exercises, therapeutic activity, neuromuscular re-education, gait training, manual therapy, modalities PRN, patient/family education, dry needling, and self care/home management       Narda Giles, PT

## 2023-05-16 NOTE — PATIENT INSTRUCTIONS
"Home Exercise Program: 05/16/2023      Meditations for Pelvic Health by Cindi Brannon: Can find on YouTube.          "Roll for Control"  Strategies to Delay Voiding    What to do when you feel like you "gotta go gotta go!"     Stop what you are doing.    Take a few good deep breaths (this settles down your nervous system and relaxes your bladder).    WHILE YOU CONTINUE DEEP BREATHING, activate your pelvic floor by performing several quick contractions and releases.  (Pelvic floor contractions send a message to the bladder to relax and hold urine.)  Sitting: Roll thigh in and out slowly or squeeze knees together  Standing: Roll thigh in/out slowly and gently    As you do the above, you can also count backwards from 100 (to help get your mind off the urge!).       After the urge to void has quietly, you may be able to process with your activity OR if it has been approximately 3 hours since you've voided, you may choose to go to the bathroom and void.        Remember......"Control your bladder before it controls YOU!"             "

## 2023-05-26 ENCOUNTER — CLINICAL SUPPORT (OUTPATIENT)
Dept: REHABILITATION | Facility: HOSPITAL | Age: 43
End: 2023-05-26
Payer: COMMERCIAL

## 2023-05-26 DIAGNOSIS — M62.89 PELVIC FLOOR DYSFUNCTION: ICD-10-CM

## 2023-05-26 DIAGNOSIS — R53.1 WEAKNESS: Primary | ICD-10-CM

## 2023-05-26 DIAGNOSIS — R27.8 OTHER LACK OF COORDINATION: ICD-10-CM

## 2023-05-26 PROCEDURE — 97110 THERAPEUTIC EXERCISES: CPT | Mod: PN

## 2023-05-26 PROCEDURE — 97112 NEUROMUSCULAR REEDUCATION: CPT | Mod: PN

## 2023-05-26 PROCEDURE — 97530 THERAPEUTIC ACTIVITIES: CPT | Mod: PN

## 2023-05-26 PROCEDURE — 97140 MANUAL THERAPY 1/> REGIONS: CPT | Mod: PN

## 2023-05-26 NOTE — PROGRESS NOTES
Pelvic Health Physical Therapy   Treatment Note     Name: Basilia Austin Ural  Clinic Number: [id number]    Therapy Diagnosis:   Encounter Diagnoses   Name Primary?    Weakness Yes    Pelvic floor dysfunction     Other lack of coordination        Physician: Leeann Green MD    Visit Date: 5/26/2023    Physician Orders: PT Eval and Treat   Medical Diagnosis from Referral: stress urinary incontinence   Evaluation Date: 10/24/2022  Authorization Period Expiration: 09/30/2023  Plan of Care Expiration: 6-29-23  Progress Note Due: 5-6-23  Visit #/Visits authorized: 9/ 20   Cancelled Visits: 0  No Show Visits: 0  FOTO: Issued Vist #:  3/3  5/26/23    Time In:  902  Time Out:  1000  Total Billable Time:   58 minutes    Precautions: Standard    Subjective     Pt reports:  She is still recovering from her hysteroscopy and ablation on May 3rd.  No reports of urinary incontinence this week.      She was compliant with home exercise program.  Response to previous treatment: no issues reported  Functional change: less leakage    Pain: none reported  Location: NA    Constitutional Symptoms Review: The patient denies having any constitutional symptoms.     Objective   Pt verbally consents to intravaginal treatment today.  Signed consent form already on file.       VAGINAL PELVIC FLOOR EXAM        INTERNAL ASSESSMENT  Pain: tender areas noted as follows: obturator internus bilaterally    Sensation: able to localized pressure appropriately   Vaginal vault: WNL   Muscle Bulk: slight increase in resting tension   Muscle Power: trace    Comments: improve ability to activate her deep pelvic floor with cues to      Basilia received the following manual therapy techniques: to develop flexibility, extensibility, and desensitization for 10 minutes including: trigger point/myofascial release of left obturator internus and levator ani intravaginally and scar mobilization of suprapubic scar  (not today)  [x] Soft tissue mobilization to  abdominal wall in suprapubic region       Basilia participated in neuromuscular re-education activities to develop Coordination, Control, and Down training for 15 minutes including: pelvic floor relaxation/bulging training  [] Used tapping intravaginally over muscle bellies to elicit a contraction with kegels. Patient demonstrates a flicker of activation about 80% of the time but improves with practice and verbal cues.  Worked on trying to elicit a contraction in layer 2 to help with urethral closure.     [] 15 min body scan practicing mindfulness  [x] Diaphragmatic breathing  [x] pelvic floor relaxation/bulging training  [x] Bent knee fall out x 10 reps bilaterally   [x] Marching x 10 reps bilaterally keeping pelvis level         Therapeutic Exercise to develop  strength and endurance for 10 minutes including:   [x] Posterior pelvic tilt 5 sec x 10 reps x 2 sets  Clamshell 5 sec x10 reps bilaterally x 2 sets with green theraband (not today)  [] Bridge with posterior pelvic tilt 5 sec x 20 reps (not today)  [x] Piriformis stretch 3x30 sec bilaterally   [] Happy baby stretch 3x30 sec   [x] Groin stretch 3 min   [] Child's pose 3x30 sec   [x] Lateral trunk rotation 3 min     Therapeutic Activity to improve dynamic functional performance, coordination and education for 23 minutes. Including: plan of care review.  Edu on anatomy and impact on current level of function.    [] Pt edu in the Roll for Control technique to decrease incontinence with strong urge.  Practiced new technique in sitting and standing.    [x] Visualization edu and practice   [x] Plan of care review and home exercise program review   [x] Anatomy review and impact on current level of function: discussed post medical procedure impact on muscle(s) function and pain levels  [x] Reviewed importance of keeping pelvic floor relaxed to help with pain with activities of daily living.   [x] Edu on health affirmations with health affirmations guided meditation    [x] Self-care strategies to help with stress reduction and pain management         Home Exercises Provided and Patient Education Provided     Education provided:   - anatomy/physiology of pelvic floor  Discussed progression of plan of care with patient; educated pt in activity modification; reviewed HEP with pt. Pt demonstrated and verbalized understanding of all instruction and was provided with a handout of HEP (see Patient Instructions).  -     Written Home Exercises Provided: yes.  Exercises were reviewed and Basilia was able to demonstrate them prior to the end of the session.  Basilia demonstrated good  understanding of the education provided.     See EMR under Patient Instructions for exercises provided 12/1/2022.    Assessment   Continued working on strategies to help with pain management today including visualization, mindfulness and meditation edu and practice.  Edu on health affirmations with health affirmations guided meditation.  Discussed self-care strategies to help with stress reduction and pain management Home exercise program was progressed to include mindfulness and meditation.  Resumed gentle strengthening exercises today and also worked on hip stretching. Worked on addressing abdominal wall muscular tightness in the suprapubic region.     Pt will continue to benefit from skilled outpatient physical therapy to address the deficits listed in the problem list box on initial evaluation, provide pt/family education and to maximize pt's level of independence in the home and community environment.     Basilia Is progressing well towards her goals.   Pt prognosis is Excellent.       Pt's spiritual, cultural and educational needs considered and pt agreeable to plan of care and goals.     Anticipated barriers to physical therapy: none    Goals:   Short Term Goals: 4 weeks   Pt to demonstrate an improved score in the FOTO urinary problem survey  to at least 56 to demonstrate improving bladder control with  activities of daily living.    Pt to demonstrate being able to correctly and consistently perform a kegel which is needed  to increase pelvic floor muscle coordination and strength needed for continence.  Pt to be able to delay the urge to urinate at least 5 minutes with a strong urge to urinate in order to make it to the bathroom without leaking.  Pt to voice understanding of the role that diet plays on urinary urgency.    Pt to report a decrease in pain to no more than 4 at it's worst with  activities of daily living with her hip.                Long Term Goals: 12 weeks   Pt to be discharged with home plan for carry over after discharge.    Pt to report an 80% reduction of urinary incontinence symptoms with ADL participation thereby demonstrating improved pelvic floor muscle control and strength.   Pt to demonstrate an improved score in the FOTO urinary problems survey  to at least 60 to demonstrate improving bladder control with activities of daily living.    Pt to be able to delay the urge to urinate at least 10 minutes with a strong urge to urinate in order to make it to the bathroom without leaking.  Pt to increase pelvic floor strength to at least 2/5 to demonstrate improved strength needed for continence with ADLs.   Pt to report a decrease in pain to no more than 2 at it's worst with activities of daily living with her hip.                PLAN   Plan of care Certification: 4-6-23 to 6-29-23     Outpatient Physical Therapy 1-2 times weekly for 12 weeks to include the following interventions: therapeutic exercises, therapeutic activity, neuromuscular re-education, gait training, manual therapy, modalities PRN, patient/family education, dry needling, and self care/home management       Narda Giles, PT

## 2023-06-08 ENCOUNTER — PATIENT MESSAGE (OUTPATIENT)
Dept: REHABILITATION | Facility: HOSPITAL | Age: 43
End: 2023-06-08
Payer: COMMERCIAL

## 2023-06-09 ENCOUNTER — CLINICAL SUPPORT (OUTPATIENT)
Dept: REHABILITATION | Facility: HOSPITAL | Age: 43
End: 2023-06-09
Payer: COMMERCIAL

## 2023-06-09 DIAGNOSIS — M62.89 PELVIC FLOOR DYSFUNCTION: ICD-10-CM

## 2023-06-09 DIAGNOSIS — R27.8 OTHER LACK OF COORDINATION: ICD-10-CM

## 2023-06-09 DIAGNOSIS — R53.1 WEAKNESS: Primary | ICD-10-CM

## 2023-06-09 PROCEDURE — 97530 THERAPEUTIC ACTIVITIES: CPT | Mod: PN

## 2023-06-09 PROCEDURE — 97140 MANUAL THERAPY 1/> REGIONS: CPT | Mod: PN

## 2023-06-09 PROCEDURE — 97110 THERAPEUTIC EXERCISES: CPT | Mod: PN

## 2023-06-09 NOTE — PROGRESS NOTES
Pelvic Health Physical Therapy   Treatment Note     Name: Basilia Austin Ural  Clinic Number: [id number]    Therapy Diagnosis:   Encounter Diagnoses   Name Primary?    Weakness Yes    Pelvic floor dysfunction     Other lack of coordination          Physician: Leeann Green MD    Visit Date: 6/9/2023    Physician Orders: PT Eval and Treat   Medical Diagnosis from Referral: stress urinary incontinence   Evaluation Date: 10/24/2022  Authorization Period Expiration: 09/30/2023  Plan of Care Expiration: 6-29-23  Progress Note Due: 5-6-23  Visit #/Visits authorized: 10/ 20   Cancelled Visits: 0  No Show Visits: 0  FOTO: Issued Vist #:  3/3  5/26/23    Time In:  905  Time Out:  1001   Total Billable Time:   56  minutes    Precautions: Standard    Subjective     Pt reports:   She reports she's had several episodes of urinary incontinence with urgency and sneezing.  She is still recovering from her hysteroscopy and ablation on May 3rd.      She was compliant with home exercise program.  Response to previous treatment: no issues reported  Functional change: less leakage    Pain: none reported  Location: NA    Constitutional Symptoms Review: The patient denies having any constitutional symptoms.     Objective   Pt verbally consents to intravaginal treatment today.  Signed consent form already on file.       VAGINAL PELVIC FLOOR EXAM        INTERNAL ASSESSMENT  Pain: tender areas noted as follows: obturator internus bilaterally    Sensation: able to localized pressure appropriately   Vaginal vault: WNL   Muscle Bulk: slight increase in resting tension   Muscle Power: trace    Comments: improve ability to activate her deep pelvic floor with cues to      Basilia received the following manual therapy techniques: to develop flexibility, extensibility, and desensitization for 23 minutes including: trigger point/myofascial release of left obturator internus and levator ani intravaginally and scar mobilization of suprapubic scar   (not today)  [x] Soft tissue mobilization to abdominal wall in suprapubic region   [x] Scar mobilization all planes to suprapubic scar  [x] Visceral mobilization around bladder and uterus       Basilia participated in neuromuscular re-education activities to develop Coordination, Control, and Down training for 00 minutes including: pelvic floor relaxation/bulging training  [] Used tapping intravaginally over muscle bellies to elicit a contraction with kegels. Patient demonstrates a flicker of activation about 80% of the time but improves with practice and verbal cues.  Worked on trying to elicit a contraction in layer 2 to help with urethral closure.     [] 15 min body scan practicing mindfulness  [] Diaphragmatic breathing  [] pelvic floor relaxation/bulging training  [] Bent knee fall out x 10 reps bilaterally   [] Marching x 10 reps bilaterally keeping pelvis level         Therapeutic Exercise to develop  strength and endurance for 10 minutes including:   [x] Posterior pelvic tilt 5 sec x 10 reps x 2 sets  Clamshell 5 sec x10 reps bilaterally x 2 sets with green theraband (not today)  [] Bridge with posterior pelvic tilt 5 sec x 20 reps (not today)  [x] Piriformis stretch 3x30 sec bilaterally   [] Happy baby stretch 3x30 sec   [x] Groin stretch 3 min   [] Child's pose 3x30 sec   [x] Lateral trunk rotation 3 min   [] Open book stretch 10 sec x 10 reps bilaterally     Therapeutic Activity to improve dynamic functional performance, coordination and education for 23 minutes. Including: plan of care review.  Edu on anatomy and impact on current level of function.    [x] Pt edu in the Roll for Control technique to decrease incontinence with strong urge.  Practiced new technique in sitting and standing.    [x] Edu on fight or flight vs rest and digest response as well as anxiety triggering the amygdala   [] Visualization edu and practice   [x] Plan of care review and home exercise program review   [x] Anatomy review and  impact on current level of function: discussed post medical procedure impact on muscle(s) function and pain levels especially posterior pelvic pain  [x] Reviewed importance of keeping pelvic floor relaxed to help with pain with activities of daily living.   [x] Reviewed edu on health affirmations with health affirmations guided meditation   [x] Self-care strategies to help with stress reduction and pain management         Home Exercises Provided and Patient Education Provided     Education provided:   - anatomy/physiology of pelvic floor  Discussed progression of plan of care with patient; educated pt in activity modification; reviewed HEP with pt. Pt demonstrated and verbalized understanding of all instruction and was provided with a handout of HEP (see Patient Instructions).  -     Written Home Exercises Provided: yes.  Exercises were reviewed and Basilia was able to demonstrate them prior to the end of the session.  Basilia demonstrated good  understanding of the education provided.     See EMR under Patient Instructions for exercises provided 12/1/2022.    Assessment   Continued working on strategies to help with pain management today including visualization, mindfulness and meditation edu and practice.  Reviewed on health affirmations with health affirmations guided meditation.  Discussed self-care strategies to help with stress reduction and pain management. Edu on fight or flight vs rest and digest response as well as anxiety triggering the amygdala Reviewed and practiced urge delay strategies.  Resumed gentle strengthening exercises today and also worked on hip stretching. Worked on addressing abdominal wall muscular tightness in the suprapubic region as well as scar tissue restrictions.      Pt will continue to benefit from skilled outpatient physical therapy to address the deficits listed in the problem list box on initial evaluation, provide pt/family education and to maximize pt's level of independence in  the home and community environment.     Basilia Is progressing well towards her goals.   Pt prognosis is Excellent.       Pt's spiritual, cultural and educational needs considered and pt agreeable to plan of care and goals.     Anticipated barriers to physical therapy: none    Goals:   Short Term Goals: 4 weeks   Pt to demonstrate an improved score in the FOTO urinary problem survey  to at least 56 to demonstrate improving bladder control with activities of daily living.    Pt to demonstrate being able to correctly and consistently perform a kegel which is needed  to increase pelvic floor muscle coordination and strength needed for continence.  Pt to be able to delay the urge to urinate at least 5 minutes with a strong urge to urinate in order to make it to the bathroom without leaking.  Pt to voice understanding of the role that diet plays on urinary urgency.    Pt to report a decrease in pain to no more than 4 at it's worst with  activities of daily living with her hip.                Long Term Goals: 12 weeks   Pt to be discharged with home plan for carry over after discharge.    Pt to report an 80% reduction of urinary incontinence symptoms with ADL participation thereby demonstrating improved pelvic floor muscle control and strength.   Pt to demonstrate an improved score in the FOTO urinary problems survey  to at least 60 to demonstrate improving bladder control with activities of daily living.    Pt to be able to delay the urge to urinate at least 10 minutes with a strong urge to urinate in order to make it to the bathroom without leaking.  Pt to increase pelvic floor strength to at least 2/5 to demonstrate improved strength needed for continence with ADLs.   Pt to report a decrease in pain to no more than 2 at it's worst with activities of daily living with her hip.                PLAN   Plan of care Certification: 4-6-23 to 6-29-23     Outpatient Physical Therapy 1-2 times weekly for 12 weeks to include the  following interventions: therapeutic exercises, therapeutic activity, neuromuscular re-education, gait training, manual therapy, modalities PRN, patient/family education, dry needling, and self care/home management       Narda Giles, PT

## 2023-06-09 NOTE — PATIENT INSTRUCTIONS
"Home Exercise Program: 06/09/2023    "Roll for Control"  Strategies to Delay Voiding    What to do when you feel like you "gotta go gotta go!"  We are trying to get out of fight or flight and enter rest and digest.      Stop what you are doing.    Take a few good deep breaths (this settles down your nervous system and relaxes your bladder).    WHILE YOU CONTINUE DEEP BREATHING, activate your pelvic floor by performing several quick contractions and releases.  (Pelvic floor contractions send a message to the bladder to relax and hold urine.)  Sitting: Roll thigh in and out slowly or squeeze knees together  Standing: Roll thigh in/out slowly and gently    As you do the above, you can also count backwards from 100 (to help get your mind off the urge!).       After the urge to void has quietly, you may be able to process with your activity OR if it has been approximately 3 hours since you've voided, you may choose to go to the bathroom and void.        Remember......"Control your bladder before it controls YOU!"   "

## 2023-06-23 ENCOUNTER — CLINICAL SUPPORT (OUTPATIENT)
Dept: REHABILITATION | Facility: HOSPITAL | Age: 43
End: 2023-06-23
Payer: COMMERCIAL

## 2023-06-23 DIAGNOSIS — R53.1 WEAKNESS: Primary | ICD-10-CM

## 2023-06-23 DIAGNOSIS — M62.89 PELVIC FLOOR DYSFUNCTION: ICD-10-CM

## 2023-06-23 DIAGNOSIS — R27.8 OTHER LACK OF COORDINATION: ICD-10-CM

## 2023-06-23 PROCEDURE — 97140 MANUAL THERAPY 1/> REGIONS: CPT | Mod: PN

## 2023-06-23 PROCEDURE — 97112 NEUROMUSCULAR REEDUCATION: CPT | Mod: PN

## 2023-06-23 PROCEDURE — 97530 THERAPEUTIC ACTIVITIES: CPT | Mod: PN

## 2023-06-23 NOTE — PROGRESS NOTES
Pelvic Health Physical Therapy   Treatment and Discharge Note     Name: Basilia Austin Ural  Clinic Number: [id number]    Therapy Diagnosis:   Encounter Diagnoses   Name Primary?    Weakness Yes    Pelvic floor dysfunction     Other lack of coordination          Physician: Leeann Green MD    Visit Date: 6/23/2023    Physician Orders: PT Eval and Treat   Medical Diagnosis from Referral: stress urinary incontinence   Evaluation Date: 10/24/2022  Authorization Period Expiration: 09/30/2023  Plan of Care Expiration: 6-29-23  Progress Note Due: 5-6-23  Visit #/Visits authorized: 11/ 20   Cancelled Visits: 0  No Show Visits: 0  FOTO: Issued Vist #:  3/3  6/23/23      Time In:  1003  Time Out: 1100   Total Billable Time:   57  minutes    Precautions: Standard    Subjective     Pt reports:   She has been using the urge delay strategy which has helped for a couple seconds at a time.  She reports she is much more aware of when her pelvic floor muscle(s) is relaxed.  She reports a decrease in urinary incontinence since starting therapy.     She was compliant with home exercise program.  Response to previous treatment: no issues reported  Functional change: less leakage    Pain: none reported  Location: NA    Constitutional Symptoms Review: The patient denies having any constitutional symptoms.     Objective   Pt verbally consents to intravaginal treatment today.  Signed consent form already on file.       VAGINAL PELVIC FLOOR EXAM        INTERNAL ASSESSMENT  Pain: tender areas noted as follows: obturator internus bilaterally    Sensation: able to localized pressure appropriately   Vaginal vault: WNL   Muscle Bulk: slight increase in resting tension   Muscle Power: trace    Comments: improve ability to activate her deep pelvic floor with cues to      Basilia received the following manual therapy techniques: to develop flexibility, extensibility, and desensitization for 23 minutes including: trigger point/myofascial  "release of left obturator internus and levator ani intravaginally and scar mobilization of suprapubic scar  (not today)  [x] Soft tissue mobilization to abdominal wall in suprapubic region   [x] Scar mobilization all planes to suprapubic scar  [x] Visceral mobilization around bladder and uterus       Therapeutic Exercise to develop  strength and endurance for 10 minutes including:   [x] Posterior pelvic tilt 5 sec x 10 reps x 2 sets  Clamshell 5 sec x10 reps bilaterally x 2 sets with green theraband (not today)  [x] Bridge with posterior pelvic tilt 5 sec x 20 reps   [x] Piriformis stretch 3x30 sec bilaterally   [x] Groin stretch 3 min   [x] Lateral trunk rotation 3 min       Therapeutic Activity to improve dynamic functional performance, coordination and education for 23 minutes. Including: plan of care review.  Edu on anatomy and impact on current level of function.    [x] Pt edu in the Roll for Control technique to decrease incontinence with strong urge.    [x] Discharge planning performed   [x] Anatomy review and impact on current level of function: discussed post medical procedure impact on muscle(s) function and pain levels especially posterior pelvic pain  [x] Reviewed importance of keeping pelvic floor relaxed to help with pain with activities of daily living.   [x] Reviewed edu on health affirmations with health affirmations guided meditation   [x] Pt edu in avoidance of "Just in Case" urination.  Shaquille picture describing how urges to urinate come in waves and discussed strategies to delay urinating.  Edu on voiding schedule.     [x] Bladder retraining and voiding schedule.  Goal is also to urinate for at least 8 seconds or more.    [x] Edu on water in take and normal voiding frequency every 3-4 hours.   [x] Review of FOTO   [x] Edu on brain plasticity         Home Exercises Provided and Patient Education Provided     Education provided:   - anatomy/physiology of pelvic floor  Discussed progression of plan " of care with patient; educated pt in activity modification; reviewed HEP with pt. Pt demonstrated and verbalized understanding of all instruction and was provided with a handout of HEP (see Patient Instructions).  -     Written Home Exercises Provided: yes.  Exercises were reviewed and Basilia was able to demonstrate them prior to the end of the session.  Basilia demonstrated good  understanding of the education provided.     See EMR under Patient Instructions for exercises provided 12/1/2022.    Assessment     Basilia has made good progress with participation in the POC towards reduction of pain, urinary urgency and urinary incontinence.  She has met the majority of her goals and is independent with her home program.  At this time Basilia no longer requires skilled intervention and is appropriate for discharge.       Discharge reason: Patient has met all of his/her goals and Patient has reached the maximum rehab potential for the present time    Discharge FOTO Score:       Goals:   Short Term Goals: 4 weeks   Pt to demonstrate an improved score in the FOTO urinary problem survey  to at least 56 to demonstrate improving bladder control with activities of daily living.  NOT MET  Pt to demonstrate being able to correctly and consistently perform a kegel which is needed  to increase pelvic floor muscle coordination and strength needed for continence. MET  Pt to be able to delay the urge to urinate at least 5 minutes with a strong urge to urinate in order to make it to the bathroom without leaking. MET  Pt to voice understanding of the role that diet plays on urinary urgency.  MET  Pt to report a decrease in pain to no more than 4 at it's worst with  activities of daily living with her hip.  MET              Long Term Goals: 12 weeks   Pt to be discharged with home plan for carry over after discharge.  MET  Pt to report an 80% reduction of urinary incontinence symptoms with ADL participation thereby demonstrating improved  pelvic floor muscle control and strength. MET  Pt to demonstrate an improved score in the FOTO urinary problems survey  to at least 60 to demonstrate improving bladder control with activities of daily living.  NOT MET  Pt to be able to delay the urge to urinate at least 10 minutes with a strong urge to urinate in order to make it to the bathroom without leaking. PARTIALLY MET  Pt to increase pelvic floor strength to at least 2/5 to demonstrate improved strength needed for continence with ADLs. MET  Pt to report a decrease in pain to no more than 2 at it's worst with activities of daily living with her hip.  MET              PLAN   This patient is discharged from Physical Therapy    Narda Giles, PT

## 2023-07-24 NOTE — PROGRESS NOTES
"       Pelvic Health Physical Therapy   Treatment and Recertification Note     Name: Basilia Austin [facility]  Clinic Number: [id number]    Therapy Diagnosis:   Encounter Diagnoses   Name Primary?    Weakness Yes    Pelvic floor dysfunction     Other lack of coordination        Physician: Leeann Green MD    Visit Date: 4/6/2023    Physician Orders: PT Eval and Treat   Medical Diagnosis from Referral: stress urinary incontinence   Evaluation Date: 10/24/2022  Authorization Period Expiration: 09/30/2023  Plan of Care Expiration: 6-29-23  Progress Note Due: 5-6-2311-24-22  Visit #/Visits authorized: 5/ 20   Cancelled Visits: 0  No Show Visits: 0  FOTO: Issued Vist #:  2/3  12/1/22       Time In:  1002  Time Out:  1100  Total Billable Time:  58 minutes    Precautions: Standard    Subjective     Pt reports:  Patient reports she has been having heavy and frequent periods recently.  "It's been going really good so I almost forgot that I've had a problem. I can't remember the last time I had a problem." She has been doing her exercises and scar massage almost every day.      She was compliant with home exercise program.  Response to previous treatment: no issues reported  Functional change: less leakage    Pain: none reported  Location: NA    Constitutional Symptoms Review: The patient denies having any constitutional symptoms.     Objective   Pt verbally consents to intravaginal treatment today.  Signed consent form already on file.       VAGINAL PELVIC FLOOR EXAM        INTERNAL ASSESSMENT  Pain: tender areas noted as follows: obturator internus bilaterally    Sensation: able to localized pressure appropriately   Vaginal vault: WNL   Muscle Bulk: slight increase in resting tension   Muscle Power: trace    Comments: improve ability to activate her deep pelvic floor with cues to      Basilia received the following manual therapy techniques: to develop flexibility, extensibility, and desensitization for 12 minutes including: " trigger point/myofascial release of left obturator internus and levator ani intravaginally and scar mobilization of suprapubic scar      Basilia participated in neuromuscular re-education activities to develop Coordination, Control, and Down training for 23 minutes including: pelvic floor relaxation/bulging training  Used tapping intravaginally over muscle bellies to elicit a contraction with kegels. Patient demonstrates a flicker of activation about 80% of the time but improves with practice and verbal cues.  Worked on trying to elicit a contraction in layer 2 to help with urethral closure.         Therapeutic Exercise to develop  strength and endurance for 15 minutes including:   Posterior pelvic tilt 5 sec x 10 reps x 2 sets   Clamshell 5 sec x10 reps bilaterally x 2 sets with green theraband (not today)  Bridge with posterior pelvic tilt 5 sec x 20 reps   Piriformis stretch 3x30 sec bilaterally   Happy baby stretch 3x30 sec   Groin stretch 3x30 sec bilaterally   Child's pose 3x30 sec     Therapeutic Activity to improve dynamic functional performance, coordination and education for 10 minutes. Including: plan of care review.  Edu on anatomy and impact on current level of function.  Reviewed importance of keeping pelvic floor relaxed to help with pain with activities of daily living.         Home Exercises Provided and Patient Education Provided     Education provided:   - anatomy/physiology of pelvic floor  Discussed progression of plan of care with patient; educated pt in activity modification; reviewed HEP with pt. Pt demonstrated and verbalized understanding of all instruction and was provided with a handout of HEP (see Patient Instructions).  -     Written Home Exercises Provided: yes.  Exercises were reviewed and Basilia was able to demonstrate them prior to the end of the session.  Basilia demonstrated good  understanding of the education provided.     See EMR under Patient Instructions for exercises provided  12/1/2022.    Assessment   Worked on pelvic floor muscle(s) myofascial release of layer 3 as well as suprapubic scar tissue mobilization.  Also worked on hip and pelvic floor muscle(s) relaxation and stretching training.  Home exercise program was progressed.  Continued hip strengthening program as well.  Patient is noted to have improved resting tension of her pelvic floor muscle(s) but continues to demonstrate pain with palpation. She is noted to now have a flicker of a pelvic floor contraction and is moving in the right direction for muscle(s) control.  Pt will continue to benefit from skilled outpatient physical therapy to address the deficits listed in the problem list box on initial evaluation, provide pt/family education and to maximize pt's level of independence in the home and community environment.     Basilia Is progressing well towards her goals.   Pt prognosis is Excellent.       Pt's spiritual, cultural and educational needs considered and pt agreeable to plan of care and goals.     Anticipated barriers to physical therapy: none    Goals:   Short Term Goals: 4 weeks   Pt to demonstrate an improved score in the FOTO urinary problem survey  to at least 56 to demonstrate improving bladder control with activities of daily living.    Pt to demonstrate being able to correctly and consistently perform a kegel which is needed  to increase pelvic floor muscle coordination and strength needed for continence.  Pt to be able to delay the urge to urinate at least 5 minutes with a strong urge to urinate in order to make it to the bathroom without leaking.  Pt to voice understanding of the role that diet plays on urinary urgency.    Pt to report a decrease in pain to no more than 4 at it's worst with  activities of daily living with her hip.                Long Term Goals: 12 weeks   Pt to be discharged with home plan for carry over after discharge.    Pt to report an 80% reduction of urinary incontinence symptoms  with ADL participation thereby demonstrating improved pelvic floor muscle control and strength.   Pt to demonstrate an improved score in the FOTO urinary problems survey  to at least 60 to demonstrate improving bladder control with activities of daily living.    Pt to be able to delay the urge to urinate at least 10 minutes with a strong urge to urinate in order to make it to the bathroom without leaking.  Pt to increase pelvic floor strength to at least 2/5 to demonstrate improved strength needed for continence with ADLs.   Pt to report a decrease in pain to no more than 2 at it's worst with activities of daily living with her hip.                PLAN   Plan of care Certification: 4-6-23 to 6-29-23     Outpatient Physical Therapy 1-2 times weekly for 12 weeks to include the following interventions: therapeutic exercises, therapeutic activity, neuromuscular re-education, gait training, manual therapy, modalities PRN, patient/family education, dry needling, and self care/home management       Narda Giles, PT                  Fall Risk; Allergy;

## 2023-10-03 ENCOUNTER — OFFICE VISIT (OUTPATIENT)
Dept: PRIMARY CARE CLINIC | Facility: CLINIC | Age: 43
End: 2023-10-03
Payer: COMMERCIAL

## 2023-10-03 ENCOUNTER — LAB VISIT (OUTPATIENT)
Dept: LAB | Facility: HOSPITAL | Age: 43
End: 2023-10-03
Attending: INTERNAL MEDICINE
Payer: COMMERCIAL

## 2023-10-03 VITALS
WEIGHT: 250.88 LBS | OXYGEN SATURATION: 97 % | HEART RATE: 96 BPM | SYSTOLIC BLOOD PRESSURE: 114 MMHG | DIASTOLIC BLOOD PRESSURE: 76 MMHG | HEIGHT: 65 IN | BODY MASS INDEX: 41.8 KG/M2

## 2023-10-03 DIAGNOSIS — E66.01 CLASS 3 SEVERE OBESITY DUE TO EXCESS CALORIES WITHOUT SERIOUS COMORBIDITY WITH BODY MASS INDEX (BMI) OF 40.0 TO 44.9 IN ADULT: ICD-10-CM

## 2023-10-03 DIAGNOSIS — F41.9 ANXIETY: ICD-10-CM

## 2023-10-03 DIAGNOSIS — R73.03 PREDIABETES: ICD-10-CM

## 2023-10-03 DIAGNOSIS — Z12.31 ENCOUNTER FOR SCREENING MAMMOGRAM FOR MALIGNANT NEOPLASM OF BREAST: ICD-10-CM

## 2023-10-03 DIAGNOSIS — Z00.00 ANNUAL PHYSICAL EXAM: ICD-10-CM

## 2023-10-03 DIAGNOSIS — N80.9 ENDOMETRIOSIS: ICD-10-CM

## 2023-10-03 DIAGNOSIS — R06.02 SHORTNESS OF BREATH: ICD-10-CM

## 2023-10-03 DIAGNOSIS — Z00.00 ANNUAL PHYSICAL EXAM: Primary | ICD-10-CM

## 2023-10-03 PROBLEM — E66.813 CLASS 3 SEVERE OBESITY DUE TO EXCESS CALORIES WITHOUT SERIOUS COMORBIDITY WITH BODY MASS INDEX (BMI) OF 40.0 TO 44.9 IN ADULT: Status: ACTIVE | Noted: 2023-10-03

## 2023-10-03 PROBLEM — Z98.890 S/P ENDOMETRIAL ABLATION: Status: RESOLVED | Noted: 2023-05-03 | Resolved: 2023-10-03

## 2023-10-03 LAB
ALBUMIN SERPL BCP-MCNC: 3.6 G/DL (ref 3.5–5.2)
ALP SERPL-CCNC: 89 U/L (ref 55–135)
ALT SERPL W/O P-5'-P-CCNC: 18 U/L (ref 10–44)
ANION GAP SERPL CALC-SCNC: 9 MMOL/L (ref 8–16)
AST SERPL-CCNC: 17 U/L (ref 10–40)
BASOPHILS # BLD AUTO: 0.05 K/UL (ref 0–0.2)
BASOPHILS NFR BLD: 0.8 % (ref 0–1.9)
BILIRUB SERPL-MCNC: 0.5 MG/DL (ref 0.1–1)
BUN SERPL-MCNC: 7 MG/DL (ref 6–20)
CALCIUM SERPL-MCNC: 9 MG/DL (ref 8.7–10.5)
CHLORIDE SERPL-SCNC: 104 MMOL/L (ref 95–110)
CHOLEST SERPL-MCNC: 179 MG/DL (ref 120–199)
CHOLEST/HDLC SERPL: 3.8 {RATIO} (ref 2–5)
CO2 SERPL-SCNC: 26 MMOL/L (ref 23–29)
CREAT SERPL-MCNC: 0.8 MG/DL (ref 0.5–1.4)
DIFFERENTIAL METHOD: NORMAL
EOSINOPHIL # BLD AUTO: 0.1 K/UL (ref 0–0.5)
EOSINOPHIL NFR BLD: 2.2 % (ref 0–8)
ERYTHROCYTE [DISTWIDTH] IN BLOOD BY AUTOMATED COUNT: 14.5 % (ref 11.5–14.5)
EST. GFR  (NO RACE VARIABLE): >60 ML/MIN/1.73 M^2
ESTIMATED AVG GLUCOSE: 134 MG/DL (ref 68–131)
GLUCOSE SERPL-MCNC: 123 MG/DL (ref 70–110)
HBA1C MFR BLD: 6.3 % (ref 4–5.6)
HCT VFR BLD AUTO: 42.3 % (ref 37–48.5)
HDLC SERPL-MCNC: 47 MG/DL (ref 40–75)
HDLC SERPL: 26.3 % (ref 20–50)
HGB BLD-MCNC: 13.8 G/DL (ref 12–16)
IMM GRANULOCYTES # BLD AUTO: 0.01 K/UL (ref 0–0.04)
IMM GRANULOCYTES NFR BLD AUTO: 0.2 % (ref 0–0.5)
LDLC SERPL CALC-MCNC: 115.2 MG/DL (ref 63–159)
LYMPHOCYTES # BLD AUTO: 2.4 K/UL (ref 1–4.8)
LYMPHOCYTES NFR BLD: 37.6 % (ref 18–48)
MCH RBC QN AUTO: 28.9 PG (ref 27–31)
MCHC RBC AUTO-ENTMCNC: 32.6 G/DL (ref 32–36)
MCV RBC AUTO: 89 FL (ref 82–98)
MONOCYTES # BLD AUTO: 0.5 K/UL (ref 0.3–1)
MONOCYTES NFR BLD: 8.3 % (ref 4–15)
NEUTROPHILS # BLD AUTO: 3.2 K/UL (ref 1.8–7.7)
NEUTROPHILS NFR BLD: 50.9 % (ref 38–73)
NONHDLC SERPL-MCNC: 132 MG/DL
NRBC BLD-RTO: 0 /100 WBC
PLATELET # BLD AUTO: 239 K/UL (ref 150–450)
PMV BLD AUTO: 11.8 FL (ref 9.2–12.9)
POTASSIUM SERPL-SCNC: 4.2 MMOL/L (ref 3.5–5.1)
PROT SERPL-MCNC: 6.7 G/DL (ref 6–8.4)
RBC # BLD AUTO: 4.77 M/UL (ref 4–5.4)
SODIUM SERPL-SCNC: 139 MMOL/L (ref 136–145)
TRIGL SERPL-MCNC: 84 MG/DL (ref 30–150)
WBC # BLD AUTO: 6.27 K/UL (ref 3.9–12.7)

## 2023-10-03 PROCEDURE — 80061 LIPID PANEL: CPT | Performed by: INTERNAL MEDICINE

## 2023-10-03 PROCEDURE — 99999 PR PBB SHADOW E&M-EST. PATIENT-LVL III: CPT | Mod: PBBFAC,,, | Performed by: INTERNAL MEDICINE

## 2023-10-03 PROCEDURE — 3074F SYST BP LT 130 MM HG: CPT | Mod: CPTII,S$GLB,, | Performed by: INTERNAL MEDICINE

## 2023-10-03 PROCEDURE — 3074F PR MOST RECENT SYSTOLIC BLOOD PRESSURE < 130 MM HG: ICD-10-PCS | Mod: CPTII,S$GLB,, | Performed by: INTERNAL MEDICINE

## 2023-10-03 PROCEDURE — 99396 PREV VISIT EST AGE 40-64: CPT | Mod: S$GLB,,, | Performed by: INTERNAL MEDICINE

## 2023-10-03 PROCEDURE — 3008F BODY MASS INDEX DOCD: CPT | Mod: CPTII,S$GLB,, | Performed by: INTERNAL MEDICINE

## 2023-10-03 PROCEDURE — 1159F MED LIST DOCD IN RCRD: CPT | Mod: CPTII,S$GLB,, | Performed by: INTERNAL MEDICINE

## 2023-10-03 PROCEDURE — 80053 COMPREHEN METABOLIC PANEL: CPT | Performed by: INTERNAL MEDICINE

## 2023-10-03 PROCEDURE — 3008F PR BODY MASS INDEX (BMI) DOCUMENTED: ICD-10-PCS | Mod: CPTII,S$GLB,, | Performed by: INTERNAL MEDICINE

## 2023-10-03 PROCEDURE — 3078F PR MOST RECENT DIASTOLIC BLOOD PRESSURE < 80 MM HG: ICD-10-PCS | Mod: CPTII,S$GLB,, | Performed by: INTERNAL MEDICINE

## 2023-10-03 PROCEDURE — 83036 HEMOGLOBIN GLYCOSYLATED A1C: CPT | Performed by: INTERNAL MEDICINE

## 2023-10-03 PROCEDURE — 1159F PR MEDICATION LIST DOCUMENTED IN MEDICAL RECORD: ICD-10-PCS | Mod: CPTII,S$GLB,, | Performed by: INTERNAL MEDICINE

## 2023-10-03 PROCEDURE — 85025 COMPLETE CBC W/AUTO DIFF WBC: CPT | Performed by: INTERNAL MEDICINE

## 2023-10-03 PROCEDURE — 36415 COLL VENOUS BLD VENIPUNCTURE: CPT | Performed by: INTERNAL MEDICINE

## 2023-10-03 PROCEDURE — 99396 PR PREVENTIVE VISIT,EST,40-64: ICD-10-PCS | Mod: S$GLB,,, | Performed by: INTERNAL MEDICINE

## 2023-10-03 PROCEDURE — 3078F DIAST BP <80 MM HG: CPT | Mod: CPTII,S$GLB,, | Performed by: INTERNAL MEDICINE

## 2023-10-03 PROCEDURE — 99999 PR PBB SHADOW E&M-EST. PATIENT-LVL III: ICD-10-PCS | Mod: PBBFAC,,, | Performed by: INTERNAL MEDICINE

## 2023-10-03 RX ORDER — ALBUTEROL SULFATE 90 UG/1
1-2 AEROSOL, METERED RESPIRATORY (INHALATION) EVERY 6 HOURS PRN
Qty: 18 G | Refills: 0 | Status: SHIPPED | OUTPATIENT
Start: 2023-10-03 | End: 2023-11-24 | Stop reason: SDUPTHER

## 2023-10-03 RX ORDER — ESCITALOPRAM OXALATE 5 MG/1
5 TABLET ORAL DAILY
Qty: 30 TABLET | Refills: 11 | Status: SHIPPED | OUTPATIENT
Start: 2023-10-03 | End: 2023-11-03 | Stop reason: SDUPTHER

## 2023-10-03 NOTE — ASSESSMENT & PLAN NOTE
Has not lost any weight since last year. Has a treadmill but hasn't done it yet.  Cooking most meals.

## 2023-10-03 NOTE — PROGRESS NOTES
[facility] Note    Chief Complaint      Chief Complaint   Patient presents with    Annual Exam     History of Present Illness      Basilia Delgado is a 43 y.o. female who presents today for Annual preventative visit.  Patient comes to appointment alone.  GYN:     Problem List Items Addressed This Visit       Anxiety    Current Assessment & Plan     Feels very anxious all the time.  Worries about things that she shouldn't.         Relevant Medications    EScitalopram oxalate (LEXAPRO) 5 MG Tab    Endometriosis    Current Assessment & Plan     S/p endometrial ablation, has not had period since. Doing well. Sees Dr. Mosley.         Class 3 severe obesity due to excess calories without serious comorbidity with body mass index (BMI) of 40.0 to 44.9 in adult    Current Assessment & Plan     Has not lost any weight since last year. Has a treadmill but hasn't done it yet.  Cooking most meals.          Prediabetes    Current Assessment & Plan     A1C 5.9 in 9/2022. Not on meds.         Relevant Orders    Hemoglobin A1C     Other Visit Diagnoses       Annual physical exam    -  Primary    Relevant Orders    CBC Auto Differential    Lipid Panel    Comprehensive Metabolic Panel    Encounter for screening mammogram for malignant neoplasm of breast        Relevant Orders    Mammo Digital Screening Bilat w/ Adan    Shortness of breath        Relevant Medications    albuterol (PROVENTIL/VENTOLIN HFA) 90 mcg/actuation inhaler            Health Maintenance   Topic Date Due    Mammogram  11/17/2023    TETANUS VACCINE  11/06/2030    Hepatitis C Screening  Completed    Lipid Panel  Completed       Past Medical History:   Diagnosis Date    Asthma        Past Surgical History:   Procedure Laterality Date    ADENOIDECTOMY  1986    APPENDECTOMY  1995    CHOLECYSTECTOMY  2008    ENDOMETRIAL ABLATION N/A 5/3/2023    Procedure: ABLATION, ENDOMETRIUM;  Surgeon: Vicki Mosley MD;  Location: [facility];  Service:  "OB/GYN;  Laterality: N/A;    LAPAROSCOPY      x 5    LAPAROTOMY      x 1    TONSILLECTOMY  1986       family history includes Alcohol abuse in her father and paternal grandfather; Asthma in her mother; Drug abuse in her brother; Kidney cancer in her paternal grandmother; Lung cancer in her maternal grandmother; Prostate cancer in her maternal grandfather; Skin cancer in her paternal grandfather; Thyroid cancer in her mother.    Social History     Tobacco Use    Smoking status: Never   Substance Use Topics    Alcohol use: Never    Drug use: Never       ROS     Outpatient Encounter Medications as of 10/3/2023   Medication Sig Dispense Refill    [DISCONTINUED] albuterol (PROVENTIL/VENTOLIN HFA) 90 mcg/actuation inhaler Inhale 1-2 puffs into the lungs every 6 (six) hours as needed for Wheezing. Rescue      albuterol (PROVENTIL/VENTOLIN HFA) 90 mcg/actuation inhaler Inhale 1-2 puffs into the lungs every 6 (six) hours as needed for Wheezing. Rescue 18 g 0    EScitalopram oxalate (LEXAPRO) 5 MG Tab Take 1 tablet (5 mg total) by mouth once daily. 30 tablet 11    [DISCONTINUED] acetaminophen (TYLENOL) 650 MG TbSR Take 1 tablet (650 mg total) by mouth every 6 (six) hours as needed. (Patient not taking: Reported on 10/3/2023) 30 tablet 1    [DISCONTINUED] ibuprofen (ADVIL,MOTRIN) 600 MG tablet Take 1 tablet (600 mg total) by mouth every 6 (six) hours as needed for Pain. (Patient not taking: Reported on 10/3/2023) 30 tablet 1     No facility-administered encounter medications on file as of 10/3/2023.        Review of patient's allergies indicates:   Allergen Reactions    Cephalosporins Anaphylaxis, Shortness Of Breath and Other (See Comments)    Pcn [penicillins]        Physical Exam      Vital Signs  Pulse: 96  SpO2: 97 %  BP: 114/76  Pain Score: 0-No pain  Height and Weight  Height: 5' 5" (165.1 cm)  Weight: 113.8 kg (250 lb 14.1 oz)  BSA (Calculated - sq m): 2.28 sq meters  BMI (Calculated): 41.7  Weight in (lb) to have BMI " "= 25: 149.9]    Physical Exam     Laboratory:  CBC:  No results for input(s): "WBC", "RBC", "HGB", "HCT", "PLT", "MCV", "MCH", "MCHC" in the last 2160 hours.  CMP:  No results for input(s): "GLU", "CALCIUM", "ALBUMIN", "PROT", "NA", "K", "CO2", "CL", "BUN", "ALKPHOS", "ALT", "AST", "BILITOT" in the last 2160 hours.    Invalid input(s): "CREATININ"  URINALYSIS:  No results for input(s): "COLORU", "CLARITYU", "SPECGRAV", "PHUR", "PROTEINUA", "GLUCOSEU", "BILIRUBINCON", "BLOODU", "WBCU", "RBCU", "BACTERIA", "MUCUS", "NITRITE", "LEUKOCYTESUR", "UROBILINOGEN", "HYALINECASTS" in the last 2160 hours.   LIPIDS:  No results for input(s): "TSH", "HDL", "CHOL", "TRIG", "LDLCALC", "CHOLHDL", "NONHDLCHOL", "TOTALCHOLEST" in the last 2160 hours.  TSH:  No results for input(s): "TSH" in the last 2160 hours.  A1C:  No results for input(s): "HGBA1C" in the last 2160 hours.    Radiology:  No results found in the last 30 days.     Assessment/Plan     Basilia Delgado is a 43 y.o.female with:    1. Annual physical exam  - CBC Auto Differential; Future  - Lipid Panel; Future  - Comprehensive Metabolic Panel; Future    2. Prediabetes  - Hemoglobin A1C; Future    3. Class 3 severe obesity due to excess calories without serious comorbidity with body mass index (BMI) of 40.0 to 44.9 in adult    4. Encounter for screening mammogram for malignant neoplasm of breast  - Mammo Digital Screening Bilat w/ Adan; Future    5. Endometriosis    6. Shortness of breath  - albuterol (PROVENTIL/VENTOLIN HFA) 90 mcg/actuation inhaler; Inhale 1-2 puffs into the lungs every 6 (six) hours as needed for Wheezing. Rescue  Dispense: 18 g; Refill: 0    7. Anxiety  - EScitalopram oxalate (LEXAPRO) 5 MG Tab; Take 1 tablet (5 mg total) by mouth once daily.  Dispense: 30 tablet; Refill: 11      -Continue current medications and maintain follow up with specialists.    -Follow up in about 4 weeks (around 10/31/2023) for Virtual Visit.       Leeann Green, " MD [facility]

## 2023-10-19 ENCOUNTER — OFFICE VISIT (OUTPATIENT)
Dept: PRIMARY CARE CLINIC | Facility: CLINIC | Age: 43
End: 2023-10-19
Payer: COMMERCIAL

## 2023-10-19 ENCOUNTER — PATIENT MESSAGE (OUTPATIENT)
Dept: PRIMARY CARE CLINIC | Facility: CLINIC | Age: 43
End: 2023-10-19
Payer: COMMERCIAL

## 2023-10-19 VITALS
SYSTOLIC BLOOD PRESSURE: 130 MMHG | HEART RATE: 77 BPM | OXYGEN SATURATION: 97 % | DIASTOLIC BLOOD PRESSURE: 80 MMHG | BODY MASS INDEX: 41.9 KG/M2 | WEIGHT: 251.75 LBS

## 2023-10-19 DIAGNOSIS — R59.9 SWOLLEN LYMPH NODES: Primary | ICD-10-CM

## 2023-10-19 DIAGNOSIS — M54.2 NECK PAIN: ICD-10-CM

## 2023-10-19 LAB
CTP QC/QA: YES
CTP QC/QA: YES
POC MOLECULAR INFLUENZA A AGN: NEGATIVE
POC MOLECULAR INFLUENZA B AGN: NEGATIVE
S PYO RRNA THROAT QL PROBE: NEGATIVE

## 2023-10-19 PROCEDURE — 99214 OFFICE O/P EST MOD 30 MIN: CPT | Mod: S$GLB,,, | Performed by: NURSE PRACTITIONER

## 2023-10-19 PROCEDURE — 1159F MED LIST DOCD IN RCRD: CPT | Mod: CPTII,S$GLB,, | Performed by: NURSE PRACTITIONER

## 2023-10-19 PROCEDURE — 3044F HG A1C LEVEL LT 7.0%: CPT | Mod: CPTII,S$GLB,, | Performed by: NURSE PRACTITIONER

## 2023-10-19 PROCEDURE — 87502 POCT INFLUENZA A/B MOLECULAR: ICD-10-PCS | Mod: QW,S$GLB,, | Performed by: NURSE PRACTITIONER

## 2023-10-19 PROCEDURE — 99999 PR PBB SHADOW E&M-EST. PATIENT-LVL III: ICD-10-PCS | Mod: PBBFAC,,, | Performed by: NURSE PRACTITIONER

## 2023-10-19 PROCEDURE — 3008F PR BODY MASS INDEX (BMI) DOCUMENTED: ICD-10-PCS | Mod: CPTII,S$GLB,, | Performed by: NURSE PRACTITIONER

## 2023-10-19 PROCEDURE — 87880 POCT RAPID STREP A: ICD-10-PCS | Mod: QW,S$GLB,, | Performed by: NURSE PRACTITIONER

## 2023-10-19 PROCEDURE — 3044F PR MOST RECENT HEMOGLOBIN A1C LEVEL <7.0%: ICD-10-PCS | Mod: CPTII,S$GLB,, | Performed by: NURSE PRACTITIONER

## 2023-10-19 PROCEDURE — 1159F PR MEDICATION LIST DOCUMENTED IN MEDICAL RECORD: ICD-10-PCS | Mod: CPTII,S$GLB,, | Performed by: NURSE PRACTITIONER

## 2023-10-19 PROCEDURE — 3008F BODY MASS INDEX DOCD: CPT | Mod: CPTII,S$GLB,, | Performed by: NURSE PRACTITIONER

## 2023-10-19 PROCEDURE — 99999 PR PBB SHADOW E&M-EST. PATIENT-LVL III: CPT | Mod: PBBFAC,,, | Performed by: NURSE PRACTITIONER

## 2023-10-19 PROCEDURE — 87880 STREP A ASSAY W/OPTIC: CPT | Mod: QW,S$GLB,, | Performed by: NURSE PRACTITIONER

## 2023-10-19 PROCEDURE — 3079F DIAST BP 80-89 MM HG: CPT | Mod: CPTII,S$GLB,, | Performed by: NURSE PRACTITIONER

## 2023-10-19 PROCEDURE — 87502 INFLUENZA DNA AMP PROBE: CPT | Mod: QW,S$GLB,, | Performed by: NURSE PRACTITIONER

## 2023-10-19 PROCEDURE — 3079F PR MOST RECENT DIASTOLIC BLOOD PRESSURE 80-89 MM HG: ICD-10-PCS | Mod: CPTII,S$GLB,, | Performed by: NURSE PRACTITIONER

## 2023-10-19 PROCEDURE — 3075F SYST BP GE 130 - 139MM HG: CPT | Mod: CPTII,S$GLB,, | Performed by: NURSE PRACTITIONER

## 2023-10-19 PROCEDURE — 3075F PR MOST RECENT SYSTOLIC BLOOD PRESS GE 130-139MM HG: ICD-10-PCS | Mod: CPTII,S$GLB,, | Performed by: NURSE PRACTITIONER

## 2023-10-19 PROCEDURE — 99214 PR OFFICE/OUTPT VISIT, EST, LEVL IV, 30-39 MIN: ICD-10-PCS | Mod: S$GLB,,, | Performed by: NURSE PRACTITIONER

## 2023-10-19 RX ORDER — AZITHROMYCIN 250 MG/1
TABLET, FILM COATED ORAL
Qty: 6 TABLET | Refills: 0 | Status: SHIPPED | OUTPATIENT
Start: 2023-10-19 | End: 2023-11-03

## 2023-10-19 NOTE — PROGRESS NOTES
[facility] Note    Chief Complaint      Chief Complaint   Patient presents with    Neck Pain       History of Present Illness      Basilia Delgado is a 43 y.o. female with chronic conditions of anxiety, endometriosis, obesity, pre diabetes who presents today for: painful gland on left side of neck X 2-3 days. Does hurt to swallow, and when laying on left side. Occasional L ear pain. +PND. Wakes her up at night. Denies fever, chills, chest pain, difficulty swallowing, changes in voice.      Past Medical History:  Past Medical History:   Diagnosis Date    Asthma        Past Surgical History:   has a past surgical history that includes Adenoidectomy (1986); Appendectomy (1995); Tonsillectomy (1986); Cholecystectomy (2008); Laparoscopy; Laparotomy; and Endometrial ablation (N/A, 5/3/2023).    Family History:  family history includes Alcohol abuse in her father and paternal grandfather; Asthma in her mother; Drug abuse in her brother; Kidney cancer in her paternal grandmother; Lung cancer in her maternal grandmother; Prostate cancer in her maternal grandfather; Skin cancer in her paternal grandfather; Thyroid cancer in her mother.     Social History:  Social History     Tobacco Use    Smoking status: Never   Substance Use Topics    Alcohol use: Never    Drug use: Never       Review of Systems   Constitutional:  Negative for chills and fever.   HENT:  Positive for ear pain.    Respiratory:  Negative for cough and shortness of breath.    Cardiovascular:  Negative for chest pain and palpitations.   Gastrointestinal:  Negative for constipation, diarrhea, nausea and vomiting.   Genitourinary:  Negative for dysuria and hematuria.   Musculoskeletal:  Positive for neck pain. Negative for falls.   Neurological:  Negative for headaches.        Medications:  Outpatient Encounter Medications as of 10/19/2023   Medication Sig Dispense Refill    albuterol (PROVENTIL/VENTOLIN HFA) 90 mcg/actuation inhaler Inhale 1-2  puffs into the lungs every 6 (six) hours as needed for Wheezing. Rescue 18 g 0    azithromycin (Z-PORTIA) 250 MG tablet Take 2 tablets by mouth on day 1; Take 1 tablet by mouth on days 2-5 6 tablet 0    EScitalopram oxalate (LEXAPRO) 5 MG Tab Take 1 tablet (5 mg total) by mouth once daily. 30 tablet 11     No facility-administered encounter medications on file as of 10/19/2023.       Allergies:  Review of patient's allergies indicates:   Allergen Reactions    Cephalosporins Anaphylaxis, Shortness Of Breath and Other (See Comments)    Pcn [penicillins]        Health Maintenance:  Immunization History   Administered Date(s) Administered    COVID-19, MRNA, LN-S, PF (Pfizer) (Purple Cap) 03/12/2021, 04/02/2021, 12/17/2021    COVID-19, mRNA, LNP-S, bivalent booster, PF (PFIZER OMICRON) 10/18/2022    Influenza - Quadrivalent - PF *Preferred* (6 months and older) 11/06/2020, 12/11/2021    Tdap 11/06/2020      Health Maintenance   Topic Date Due    Mammogram  11/17/2023    TETANUS VACCINE  11/06/2030    Hepatitis C Screening  Completed    Lipid Panel  Completed        Physical Exam      Vital Signs  Pulse: 77  SpO2: 97 %  BP: 130/80  BP Location: Right arm  Pain Score:   6  Height and Weight  Weight: 114.2 kg (251 lb 12.3 oz)]    Physical Exam  Constitutional:       Appearance: She is well-developed.   HENT:      Head: Normocephalic and atraumatic.      Right Ear: Tympanic membrane normal.      Left Ear: Tympanic membrane is erythematous.      Nose: Nose normal.      Mouth/Throat:      Mouth: Mucous membranes are moist.      Pharynx: No posterior oropharyngeal erythema.   Cardiovascular:      Rate and Rhythm: Normal rate and regular rhythm.      Heart sounds: Normal heart sounds. No murmur heard.  Pulmonary:      Effort: Pulmonary effort is normal. No respiratory distress.      Breath sounds: Normal breath sounds.   Lymphadenopathy:      Cervical: No cervical adenopathy.   Skin:     General: Skin is warm and dry.    Neurological:      Mental Status: She is alert. Mental status is at baseline.   Psychiatric:         Behavior: Behavior normal.          Laboratory:  CBC:  Recent Labs   Lab 09/30/22  1107 05/03/23  0857 10/03/23  1105   WBC 6.02 6.31 6.27   RBC 4.49 4.60 4.77   Hemoglobin 13.7 13.1 13.8   Hematocrit 43.6 41.8 42.3   Platelets 216 237 239   MCV 97 91 89   MCH 30.5 28.5 28.9   MCHC 31.4 L 31.3 L 32.6     CMP:  Recent Labs   Lab 09/30/22  1107 10/03/23  1105   Glucose 120 H 123 H   Calcium 9.3 9.0   Albumin 3.5 3.6   Total Protein 6.6 6.7   Sodium 137 139   Potassium 4.1 4.2   CO2 24 26   Chloride 102 104   BUN 9 7   Alkaline Phosphatase 73 89   ALT 17 18   AST 15 17   Total Bilirubin 0.4 0.5     URINALYSIS:       LIPIDS:  Recent Labs   Lab 09/30/22  1107 10/03/23  1105   TSH 1.853  --    HDL 51 47   Cholesterol 177 179   Triglycerides 90 84   LDL Cholesterol 108.0 115.2   HDL/Cholesterol Ratio 28.8 26.3   Non-HDL Cholesterol 126 132   Total Cholesterol/HDL Ratio 3.5 3.8     TSH:  Recent Labs   Lab 09/30/22  1107   TSH 1.853     A1C:  Recent Labs   Lab 09/30/22  1107 10/03/23  1105   Hemoglobin A1C 5.9 H 6.3 H       Assessment/Plan     Basilia Delgado is a 43 y.o.female with:    1. Swollen lymph nodes  - US Soft Tissue Head Neck Thyroid; Future  - POCT Influenza A/B Molecular  - POCT Rapid Strep A  - azithromycin (Z-PORTIA) 250 MG tablet; Take 2 tablets by mouth on day 1; Take 1 tablet by mouth on days 2-5  Dispense: 6 tablet; Refill: 0  Flu and strep negative in office. Will home test for Covid.   Will follow up with ultrasound if no improvement in antibiotic. Will also start Flonase and zyrtec for PND. Instructed to go to ER if develop fever, chills, difficulty swallowing and pain. Pt aware.     2. Neck pain  - US Soft Tissue Head Neck Thyroid; Future  - POCT Influenza A/B Molecular  - POCT Rapid Strep A  - azithromycin (Z-PORTIA) 250 MG tablet; Take 2 tablets by mouth on day 1; Take 1 tablet by mouth on days 2-5   Dispense: 6 tablet; Refill: 0  Ibuprofen/tylenol as needed       Chronic conditions status updated as per HPI.  Other than changes above, cont current medications and maintain follow up with specialists.  No follow-ups on file.    Future Appointments   Date Time Provider Department Center   10/20/2023 10:20 AM [facility] MAMMO1 [facility]   10/30/2023 10:30 AM Vicki Mosley MD [facility]   11/3/2023  7:15 AM Leeann Green MD [facility] [place] Adreinne Cotaya, FNP [facility]

## 2023-10-20 ENCOUNTER — HOSPITAL ENCOUNTER (OUTPATIENT)
Dept: RADIOLOGY | Facility: HOSPITAL | Age: 43
Discharge: HOME | End: 2023-10-20
Attending: INTERNAL MEDICINE
Payer: COMMERCIAL

## 2023-10-20 DIAGNOSIS — Z12.31 ENCOUNTER FOR SCREENING MAMMOGRAM FOR MALIGNANT NEOPLASM OF BREAST: ICD-10-CM

## 2023-10-20 PROCEDURE — 77067 SCR MAMMO BI INCL CAD: CPT | Mod: TC

## 2023-10-20 PROCEDURE — 77067 SCR MAMMO BI INCL CAD: CPT | Mod: 26,,, | Performed by: RADIOLOGY

## 2023-10-20 PROCEDURE — 77063 MAMMO DIGITAL SCREENING BILAT WITH TOMO: ICD-10-PCS | Mod: 26,,, | Performed by: RADIOLOGY

## 2023-10-20 PROCEDURE — 77067 MAMMO DIGITAL SCREENING BILAT WITH TOMO: ICD-10-PCS | Mod: 26,,, | Performed by: RADIOLOGY

## 2023-10-20 PROCEDURE — 77063 BREAST TOMOSYNTHESIS BI: CPT | Mod: 26,,, | Performed by: RADIOLOGY

## 2023-10-24 ENCOUNTER — HOSPITAL ENCOUNTER (OUTPATIENT)
Dept: RADIOLOGY | Facility: HOSPITAL | Age: 43
Discharge: HOME | End: 2023-10-24
Attending: NURSE PRACTITIONER
Payer: COMMERCIAL

## 2023-10-24 DIAGNOSIS — R59.9 SWOLLEN LYMPH NODES: ICD-10-CM

## 2023-10-24 DIAGNOSIS — M54.2 NECK PAIN: ICD-10-CM

## 2023-10-24 PROCEDURE — 76536 US EXAM OF HEAD AND NECK: CPT | Mod: TC

## 2023-10-24 PROCEDURE — 76536 US SOFT TISSUE HEAD NECK THYROID: ICD-10-PCS | Mod: 26,,, | Performed by: RADIOLOGY

## 2023-10-24 PROCEDURE — 76536 US EXAM OF HEAD AND NECK: CPT | Mod: 26,,, | Performed by: RADIOLOGY

## 2023-10-30 ENCOUNTER — OFFICE VISIT (OUTPATIENT)
Dept: OBSTETRICS AND GYNECOLOGY | Facility: CLINIC | Age: 43
End: 2023-10-30
Payer: COMMERCIAL

## 2023-10-30 VITALS
SYSTOLIC BLOOD PRESSURE: 106 MMHG | WEIGHT: 252.44 LBS | BODY MASS INDEX: 42.06 KG/M2 | HEIGHT: 65 IN | DIASTOLIC BLOOD PRESSURE: 71 MMHG

## 2023-10-30 DIAGNOSIS — Z01.419 WELL WOMAN EXAM WITH ROUTINE GYNECOLOGICAL EXAM: Primary | ICD-10-CM

## 2023-10-30 PROCEDURE — 3078F PR MOST RECENT DIASTOLIC BLOOD PRESSURE < 80 MM HG: ICD-10-PCS | Mod: CPTII,S$GLB,, | Performed by: STUDENT IN AN ORGANIZED HEALTH CARE EDUCATION/TRAINING PROGRAM

## 2023-10-30 PROCEDURE — 3078F DIAST BP <80 MM HG: CPT | Mod: CPTII,S$GLB,, | Performed by: STUDENT IN AN ORGANIZED HEALTH CARE EDUCATION/TRAINING PROGRAM

## 2023-10-30 PROCEDURE — 3008F PR BODY MASS INDEX (BMI) DOCUMENTED: ICD-10-PCS | Mod: CPTII,S$GLB,, | Performed by: STUDENT IN AN ORGANIZED HEALTH CARE EDUCATION/TRAINING PROGRAM

## 2023-10-30 PROCEDURE — 3008F BODY MASS INDEX DOCD: CPT | Mod: CPTII,S$GLB,, | Performed by: STUDENT IN AN ORGANIZED HEALTH CARE EDUCATION/TRAINING PROGRAM

## 2023-10-30 PROCEDURE — 1159F MED LIST DOCD IN RCRD: CPT | Mod: CPTII,S$GLB,, | Performed by: STUDENT IN AN ORGANIZED HEALTH CARE EDUCATION/TRAINING PROGRAM

## 2023-10-30 PROCEDURE — 3074F PR MOST RECENT SYSTOLIC BLOOD PRESSURE < 130 MM HG: ICD-10-PCS | Mod: CPTII,S$GLB,, | Performed by: STUDENT IN AN ORGANIZED HEALTH CARE EDUCATION/TRAINING PROGRAM

## 2023-10-30 PROCEDURE — 1159F PR MEDICATION LIST DOCUMENTED IN MEDICAL RECORD: ICD-10-PCS | Mod: CPTII,S$GLB,, | Performed by: STUDENT IN AN ORGANIZED HEALTH CARE EDUCATION/TRAINING PROGRAM

## 2023-10-30 PROCEDURE — 99396 PR PREVENTIVE VISIT,EST,40-64: ICD-10-PCS | Mod: S$GLB,,, | Performed by: STUDENT IN AN ORGANIZED HEALTH CARE EDUCATION/TRAINING PROGRAM

## 2023-10-30 PROCEDURE — 3044F PR MOST RECENT HEMOGLOBIN A1C LEVEL <7.0%: ICD-10-PCS | Mod: CPTII,S$GLB,, | Performed by: STUDENT IN AN ORGANIZED HEALTH CARE EDUCATION/TRAINING PROGRAM

## 2023-10-30 PROCEDURE — 3074F SYST BP LT 130 MM HG: CPT | Mod: CPTII,S$GLB,, | Performed by: STUDENT IN AN ORGANIZED HEALTH CARE EDUCATION/TRAINING PROGRAM

## 2023-10-30 PROCEDURE — 99396 PREV VISIT EST AGE 40-64: CPT | Mod: S$GLB,,, | Performed by: STUDENT IN AN ORGANIZED HEALTH CARE EDUCATION/TRAINING PROGRAM

## 2023-10-30 PROCEDURE — 3044F HG A1C LEVEL LT 7.0%: CPT | Mod: CPTII,S$GLB,, | Performed by: STUDENT IN AN ORGANIZED HEALTH CARE EDUCATION/TRAINING PROGRAM

## 2023-10-30 NOTE — PROGRESS NOTES
History & Physical  Gynecology      SUBJECTIVE:     Chief Complaint: Well Woman         History of Present Illness:  Annual exam. S/p ablation 2023. No bleeding  Obstetric Hx: ,  (21 and 15; 2 girls)  LMP: n/a  STD/STI Hx: Denies any history of STD's  Contraception Hx: vasectomy   Sexually Active: one male partner  Family history: Denies any personal or family history of GYN/colon cancers. Mother thyroid   Social: Wears seatbelts. Exercises sometimes. Feels safe at home.   Last pap:  normal  Mammogram 10/2023  Covid vaccine vaccinated  Vitamins: yes        Review of patient's allergies indicates:   Allergen Reactions    Cephalosporins Anaphylaxis, Shortness Of Breath and Other (See Comments)    Pcn [penicillins]        Past Medical History:   Diagnosis Date    Asthma      Past Surgical History:   Procedure Laterality Date    ADENOIDECTOMY      APPENDECTOMY      CHOLECYSTECTOMY  2008    ENDOMETRIAL ABLATION N/A 5/3/2023    Procedure: ABLATION, ENDOMETRIUM;  Surgeon: Vicki Mosley MD;  Location: [facility] OR;  Service: OB/GYN;  Laterality: N/A;    LAPAROSCOPY      x 5    LAPAROTOMY      x 1    TONSILLECTOMY       OB History          2    Para   2    Term   2            AB        Living             SAB        IAB        Ectopic        Multiple        Live Births                   Family History   Problem Relation Age of Onset    Asthma Mother     Thyroid cancer Mother     Alcohol abuse Father     Drug abuse Brother     Lung cancer Maternal Grandmother     Prostate cancer Maternal Grandfather     Kidney cancer Paternal Grandmother     Alcohol abuse Paternal Grandfather     Skin cancer Paternal Grandfather      Social History     Tobacco Use    Smoking status: Never   Substance Use Topics    Alcohol use: Never    Drug use: Never       Current Outpatient Medications   Medication Sig    albuterol (PROVENTIL/VENTOLIN HFA) 90 mcg/actuation inhaler Inhale 1-2 puffs into the lungs every 6  (six) hours as needed for Wheezing. Rescue    EScitalopram oxalate (LEXAPRO) 5 MG Tab Take 1 tablet (5 mg total) by mouth once daily.     No current facility-administered medications for this visit.         Review of Systems:  Review of Systems   Constitutional:  Negative for activity change, appetite change, fever and unexpected weight change.   Respiratory:  Negative for shortness of breath.    Cardiovascular:  Negative for chest pain.   Gastrointestinal:  Negative for abdominal pain, blood in stool, constipation, diarrhea, nausea and vomiting.   Genitourinary:  Negative for dysmenorrhea, dyspareunia, dysuria, hematuria, menorrhagia, menstrual problem, pelvic pain, vaginal bleeding, vaginal discharge, vaginal pain, urinary incontinence (stress), postcoital bleeding and vaginal odor.   Integumentary:  Negative for breast mass, nipple discharge and breast skin changes.   Breast: Negative for lump, mass, mastodynia, nipple discharge and skin changes       OBJECTIVE:     Physical Exam:  Physical Exam  Vitals reviewed.   Constitutional:       General: She is not in acute distress.     Appearance: She is well-developed. She is not diaphoretic.   HENT:      Head: Normocephalic and atraumatic.   Eyes:      General: No scleral icterus.        Right eye: No discharge.         Left eye: No discharge.      Conjunctiva/sclera: Conjunctivae normal.   Neck:      Thyroid: No thyromegaly.   Cardiovascular:      Rate and Rhythm: Normal rate.   Pulmonary:      Effort: Pulmonary effort is normal.   Chest:   Breasts:     Breasts are symmetrical.      Right: No inverted nipple, mass, nipple discharge, skin change or tenderness.      Left: No inverted nipple, mass, nipple discharge, skin change or tenderness.   Abdominal:      General: There is no distension.      Palpations: Abdomen is soft.      Tenderness: There is no abdominal tenderness.   Genitourinary:     Labia:         Right: No rash, tenderness, lesion or injury.         Left:  No rash, tenderness, lesion or injury.       Vagina: Normal. No signs of injury and foreign body. No vaginal discharge, erythema, tenderness or bleeding.      Cervix: No cervical motion tenderness, discharge or friability.      Uterus: Not deviated, not enlarged, not fixed and not tender.       Adnexa:         Right: No mass, tenderness or fullness.          Left: No mass, tenderness or fullness.     Musculoskeletal:         General: Normal range of motion.      Cervical back: Normal range of motion and neck supple.   Lymphadenopathy:      Cervical: No cervical adenopathy.   Skin:     General: Skin is warm and dry.      Findings: No erythema or rash.   Neurological:      Mental Status: She is alert and oriented to person, place, and time.       ASSESSMENT:       ICD-10-CM ICD-9-CM    1. Well woman exam with routine gynecological exam  Z01.419 V72.31              Plan:    WWE  - Vaccines utd  - Pap and co test due 2025  - Mammogram utd  - Colonoscopy next year  - GC/CT, affirm n/a  - Daily vitamin discussed.  - CBE normal. Physical exam normal. VSS.  - RTC for annual or PRN.    Counseling time: 15 minutes    Vicki Mosley

## 2023-11-03 ENCOUNTER — OFFICE VISIT (OUTPATIENT)
Dept: PRIMARY CARE CLINIC | Facility: CLINIC | Age: 43
End: 2023-11-03
Payer: COMMERCIAL

## 2023-11-03 DIAGNOSIS — N39.46 MIXED INCONTINENCE: ICD-10-CM

## 2023-11-03 DIAGNOSIS — F41.9 ANXIETY: Primary | ICD-10-CM

## 2023-11-03 PROCEDURE — 99214 PR OFFICE/OUTPT VISIT, EST, LEVL IV, 30-39 MIN: ICD-10-PCS | Mod: 95,,, | Performed by: INTERNAL MEDICINE

## 2023-11-03 PROCEDURE — 1159F PR MEDICATION LIST DOCUMENTED IN MEDICAL RECORD: ICD-10-PCS | Mod: CPTII,95,, | Performed by: INTERNAL MEDICINE

## 2023-11-03 PROCEDURE — 3044F PR MOST RECENT HEMOGLOBIN A1C LEVEL <7.0%: ICD-10-PCS | Mod: CPTII,95,, | Performed by: INTERNAL MEDICINE

## 2023-11-03 PROCEDURE — 1159F MED LIST DOCD IN RCRD: CPT | Mod: CPTII,95,, | Performed by: INTERNAL MEDICINE

## 2023-11-03 PROCEDURE — 1160F PR REVIEW ALL MEDS BY PRESCRIBER/CLIN PHARMACIST DOCUMENTED: ICD-10-PCS | Mod: CPTII,95,, | Performed by: INTERNAL MEDICINE

## 2023-11-03 PROCEDURE — 3044F HG A1C LEVEL LT 7.0%: CPT | Mod: CPTII,95,, | Performed by: INTERNAL MEDICINE

## 2023-11-03 PROCEDURE — 99214 OFFICE O/P EST MOD 30 MIN: CPT | Mod: 95,,, | Performed by: INTERNAL MEDICINE

## 2023-11-03 PROCEDURE — 1160F RVW MEDS BY RX/DR IN RCRD: CPT | Mod: CPTII,95,, | Performed by: INTERNAL MEDICINE

## 2023-11-03 RX ORDER — ESCITALOPRAM OXALATE 10 MG/1
10 TABLET ORAL DAILY
Qty: 30 TABLET | Refills: 5 | Status: SHIPPED | OUTPATIENT
Start: 2023-11-03 | End: 2024-02-29 | Stop reason: SDUPTHER

## 2023-11-03 RX ORDER — OXYBUTYNIN CHLORIDE 5 MG/1
5 TABLET, EXTENDED RELEASE ORAL DAILY
Qty: 30 TABLET | Refills: 11 | Status: SHIPPED | OUTPATIENT
Start: 2023-11-03 | End: 2024-11-02

## 2023-11-03 NOTE — ASSESSMENT & PLAN NOTE
Started on lexapro 5 mg at last visit. Has not noticed any SE's but no improvement of anxiety either.  Feels very anxious all the time.  Worries about things that she shouldn't. Gets anxiety in car wash.

## 2023-11-03 NOTE — PROGRESS NOTES
The patient location is: home  The chief complaint leading to consultation is: anxiety    Visit type: audiovisual    Face to Face time with patient: 10 minutes  10 minutes of total time spent on the encounter, which includes face to face time and non-face to face time preparing to see the patient (eg, review of tests), Obtaining and/or reviewing separately obtained history, Documenting clinical information in the electronic or other health record, Independently interpreting results (not separately reported) and communicating results to the patient/family/caregiver, or Care coordination (not separately reported).     Each patient to whom he or she provides medical services by telemedicine is:  (1) informed of the relationship between the physician and patient and the respective role of any other health care provider with respect to management of the patient; and (2) notified that he or she may decline to receive medical services by telemedicine and may withdraw from such care at any time.    [facility] Note    Chief Complaint      Chief Complaint   Patient presents with    Anxiety     History of Present Illness      Basilia Delgado is a 43 y.o. female who presents today for anxiety.  Patient comes to appointment alone.    Has been having issues with urinary incontinence. Did pelvic floor PT last year and did much better.  Can't do kegels 2/2 scar tissue from previous surgeries. Just seen by GYN.    Problem List Items Addressed This Visit       Anxiety - Primary    Current Assessment & Plan     Started on lexapro 5 mg at last visit. Has not noticed any SE's but no improvement of anxiety either.  Feels very anxious all the time.  Worries about things that she shouldn't. Gets anxiety in car wash.         Relevant Medications    EScitalopram oxalate (LEXAPRO) 10 MG tablet     Other Visit Diagnoses       Mixed incontinence        Relevant Medications    oxybutynin (DITROPAN-XL) 5 MG TR24             Health Maintenance   Topic Date Due    Mammogram  10/20/2024    TETANUS VACCINE  11/06/2030    Hepatitis C Screening  Completed    Lipid Panel  Completed       Past Medical History:   Diagnosis Date    Asthma        Past Surgical History:   Procedure Laterality Date    ADENOIDECTOMY  1986    APPENDECTOMY  1995    CHOLECYSTECTOMY  2008    ENDOMETRIAL ABLATION N/A 5/3/2023    Procedure: ABLATION, ENDOMETRIUM;  Surgeon: Vicki Mosley MD;  Location: [facility];  Service: OB/GYN;  Laterality: N/A;    LAPAROSCOPY      x 5    LAPAROTOMY      x 1    TONSILLECTOMY  1986       family history includes Alcohol abuse in her father and paternal grandfather; Asthma in her mother; Drug abuse in her brother; Kidney cancer in her paternal grandmother; Lung cancer in her maternal grandmother; Prostate cancer in her maternal grandfather; Skin cancer in her paternal grandfather; Thyroid cancer in her mother.    Social History     Tobacco Use    Smoking status: Never   Substance Use Topics    Alcohol use: Never    Drug use: Never       Review of Systems   Constitutional:  Negative for chills and fever.   HENT:  Negative for hearing loss.    Eyes:  Negative for discharge.   Respiratory:  Negative for cough, shortness of breath and wheezing.    Cardiovascular:  Negative for chest pain and palpitations.   Gastrointestinal:  Negative for blood in stool, constipation, diarrhea, nausea and vomiting.   Genitourinary:  Negative for dysuria and hematuria.   Musculoskeletal:  Negative for falls and neck pain.   Neurological:  Negative for weakness and headaches.   Endo/Heme/Allergies:  Negative for polydipsia.        Outpatient Encounter Medications as of 11/3/2023   Medication Sig Dispense Refill    albuterol (PROVENTIL/VENTOLIN HFA) 90 mcg/actuation inhaler Inhale 1-2 puffs into the lungs every 6 (six) hours as needed for Wheezing. Rescue 18 g 0    EScitalopram oxalate (LEXAPRO) 10 MG tablet Take 1 tablet (10 mg total) by mouth once  "daily. 30 tablet 5    oxybutynin (DITROPAN-XL) 5 MG TR24 Take 1 tablet (5 mg total) by mouth once daily. 30 tablet 11    [DISCONTINUED] azithromycin (Z-PORTIA) 250 MG tablet Take 2 tablets by mouth on day 1; Take 1 tablet by mouth on days 2-5 6 tablet 0    [DISCONTINUED] EScitalopram oxalate (LEXAPRO) 5 MG Tab Take 1 tablet (5 mg total) by mouth once daily. 30 tablet 11     No facility-administered encounter medications on file as of 11/3/2023.        Review of patient's allergies indicates:   Allergen Reactions    Cephalosporins Anaphylaxis, Shortness Of Breath and Other (See Comments)    Pcn [penicillins]        Physical Exam       ]    Physical Exam  Constitutional:       General: She is not in acute distress.     Appearance: She is well-developed.   HENT:      Head: Normocephalic and atraumatic.   Pulmonary:      Effort: Pulmonary effort is normal.   Musculoskeletal:      Cervical back: Normal range of motion.   Skin:     Findings: No rash.   Neurological:      Mental Status: She is alert and oriented to person, place, and time.      Coordination: Coordination normal.   Psychiatric:         Behavior: Behavior normal.         Thought Content: Thought content normal.         Judgment: Judgment normal.          Laboratory:  CBC:  Recent Labs   Lab Result Units 10/03/23  1105   WBC K/uL 6.27   RBC M/uL 4.77   Hemoglobin g/dL 13.8   Hematocrit % 42.3   Platelets K/uL 239   MCV fL 89   MCH pg 28.9   MCHC g/dL 32.6     CMP:  Recent Labs   Lab Result Units 10/03/23  1105   Glucose mg/dL 123*   Calcium mg/dL 9.0   Albumin g/dL 3.6   Total Protein g/dL 6.7   Sodium mmol/L 139   Potassium mmol/L 4.2   CO2 mmol/L 26   Chloride mmol/L 104   BUN mg/dL 7   Alkaline Phosphatase U/L 89   ALT U/L 18   AST U/L 17   Total Bilirubin mg/dL 0.5     URINALYSIS:  No results for input(s): "COLORU", "CLARITYU", "SPECGRAV", "PHUR", "PROTEINUA", "GLUCOSEU", "BILIRUBINCON", "BLOODU", "WBCU", "RBCU", "BACTERIA", "MUCUS", "NITRITE", " ""LEUKOCYTESUR", "UROBILINOGEN", "HYALINECASTS" in the last 2160 hours.   LIPIDS:  Recent Labs   Lab Result Units 10/03/23  1105   HDL mg/dL 47   Cholesterol mg/dL 179   Triglycerides mg/dL 84   LDL Cholesterol mg/dL 115.2   HDL/Cholesterol Ratio % 26.3   Non-HDL Cholesterol mg/dL 132   Total Cholesterol/HDL Ratio  3.8     TSH:  No results for input(s): "TSH" in the last 2160 hours.  A1C:  Recent Labs   Lab Result Units 10/03/23  1105   Hemoglobin A1C % 6.3*       Radiology:  Mammo Digital Screening Bilat w/ Adan    Result Date: 10/26/2023  Result:   Mammo Digital Screening Bilat w/ Adan     History:  Patient is 43 y.o. and is seen for a screening mammogram.    Films Compared:  Compared to: 11/17/2022 US Breast Right Limited, 11/17/2022 Mammo Digital   Diagnostic Right with Adan, and 10/18/2022 Mammo Digital Screening Bilat   w/ Adan     Findings:  This procedure was performed using tomosynthesis. Computer-aided detection   was utilized in the interpretation of this examination.  The breasts have scattered areas of fibroglandular density. There is no   evidence of suspicious masses, calcifications, or other abnormal findings.        US Soft Tissue Head Neck Thyroid    Result Date: 10/24/2023  EXAMINATION:  US SOFT TISSUE HEAD NECK THYROID    CLINICAL HISTORY:  Enlarged lymph nodes, unspecified    FINDINGS:  The right lobe measures 4.6 x 1.3 x 1.1 cm, left lobe 3.8 x 0.9 x 1.4 cm.    Total volume is 6 mL.  There is a 2 mm nodule right upper pole and a left midpole 4 mm nodule.  No adenopathy is seen.           Assessment/Plan     Basilia Delgado is a 43 y.o.female with:    1. Anxiety  - EScitalopram oxalate (LEXAPRO) 10 MG tablet; Take 1 tablet (10 mg total) by mouth once daily.  Dispense: 30 tablet; Refill: 5    2. Mixed incontinence  - oxybutynin (DITROPAN-XL) 5 MG TR24; Take 1 tablet (5 mg total) by mouth once daily.  Dispense: 30 tablet; Refill: 11      -Continue current medications and maintain follow up with " specialists.    -Follow up if symptoms worsen or fail to improve.       Leeann Green MD  [facility]                  Answers submitted by the patient for this visit:  Review of Systems Questionnaire (Submitted on 11/3/2023)  activity change: No  unexpected weight change: No  rhinorrhea: No  trouble swallowing: No  visual disturbance: No  chest tightness: No  polyuria: No  difficulty urinating: No  menstrual problem: No  joint swelling: No  arthralgias: No  confusion: No  dysphoric mood: No

## 2023-11-23 DIAGNOSIS — R06.02 SHORTNESS OF BREATH: ICD-10-CM

## 2023-11-23 NOTE — TELEPHONE ENCOUNTER
Refill Routing Note   Medication(s) are not appropriate for processing by [facility] for the following reason(s):        New or recently adjusted medication    [facility] action(s):  Defer               Appointments  past 12m or future 3m with PCP    Date Provider   Last Visit   11/3/2023 Leeann Green MD   Next Visit   Visit date not found Leeann Green MD   ED visits in past 90 days: 0        Note composed:9:13 AM 11/23/2023

## 2023-11-23 NOTE — TELEPHONE ENCOUNTER
No care due was identified.  [facility] Embedded Care Due Messages. Reference number: [id number].   11/23/2023 12:45:39 AM CST

## 2023-11-24 RX ORDER — ALBUTEROL SULFATE 90 UG/1
AEROSOL, METERED RESPIRATORY (INHALATION)
Qty: 16 G | Refills: 1 | Status: SHIPPED | OUTPATIENT
Start: 2023-11-24

## 2024-02-27 ENCOUNTER — OFFICE VISIT (OUTPATIENT)
Dept: PRIMARY CARE CLINIC | Facility: CLINIC | Age: 44
End: 2024-02-27
Payer: COMMERCIAL

## 2024-02-27 DIAGNOSIS — F41.8 SITUATIONAL ANXIETY: ICD-10-CM

## 2024-02-27 DIAGNOSIS — J01.10 ACUTE NON-RECURRENT FRONTAL SINUSITIS: Primary | ICD-10-CM

## 2024-02-27 PROCEDURE — 99214 OFFICE O/P EST MOD 30 MIN: CPT | Mod: 95,,, | Performed by: NURSE PRACTITIONER

## 2024-02-27 RX ORDER — AZITHROMYCIN 250 MG/1
TABLET, FILM COATED ORAL
Qty: 6 TABLET | Refills: 0 | Status: SHIPPED | OUTPATIENT
Start: 2024-02-27 | End: 2024-03-03

## 2024-02-27 RX ORDER — ALPRAZOLAM 0.25 MG/1
0.25 TABLET ORAL 2 TIMES DAILY PRN
Qty: 15 TABLET | Refills: 0 | Status: SHIPPED | OUTPATIENT
Start: 2024-02-27 | End: 2024-03-28

## 2024-02-27 NOTE — PROGRESS NOTES
[facility] Virtual Visit Note    The patient location is: [place]  The chief complaint leading to consultation is:   Visit type: Virtual visit with synchronous audio and video  Total time spent with patient: 10  Each patient to whom he or she provides medical services by telemedicine is:  (1) informed of the relationship between the physician and patient and the respective role of any other health care provider with respect to management of the patient; and (2) notified that he or she may decline to receive medical services by telemedicine and may withdraw from such care at any time.      Chief Complaint    No chief complaint on file.      History of Present Illness      Basilia Delgado is a 43 y.o. female with chronic conditions of asthma who presents today for: tested positive for COVID on 2/15, still feeling sick. Not as much chest congestion. Denies fevers or chills. Still doesn't have taste or smell. Did re-test this past week and was negative. Still suffering with ear congestion, feels left ear pain, frontal sinus pain, and sore throat. Taking sudafed OTC with some relief. Was previously taken mucinex.     Situational anxiety: fear of flying. Has physical symptoms and anxiety. 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 Hasn't flown in 20 years.    Answers submitted by the patient for this visit:  Sore Throat Questionnaire (Submitted on 2/27/2024)  Chief Complaint: Sore throat  Onset: 1 to 4 weeks ago  Progression since onset: waxing and waning  Pain worse on: neither  Pain - numeric: 5/10  hoarse voice: Yes  plugged ear sensation: Yes  trouble swallowing: Yes  strep: No  mono: No  Treatments tried: acetaminophen  Improvement on treatment: mild  Pain severity: moderate      Past Medical History:  Past Medical History:   Diagnosis Date    Asthma        Past Surgical History:   has a past surgical history that includes Adenoidectomy (1986); Appendectomy (1995);  Tonsillectomy (1986); Cholecystectomy (2008); Laparoscopy; Laparotomy; and Endometrial ablation (N/A, 5/3/2023).    Family History:  family history includes Alcohol abuse in her father and paternal grandfather; Asthma in her mother; Drug abuse in her brother; Kidney cancer in her paternal grandmother; Lung cancer in her maternal grandmother; Prostate cancer in her maternal grandfather; Skin cancer in her paternal grandfather; Thyroid cancer in her mother.     Social History:  Social History     Tobacco Use    Smoking status: Never   Substance Use Topics    Alcohol use: Never    Drug use: Never       Review of Systems   HENT:  Positive for congestion, ear pain and sore throat.         Medications:  Outpatient Encounter Medications as of 2/27/2024   Medication Sig Dispense Refill    albuterol (PROVENTIL/VENTOLIN HFA) 90 mcg/actuation inhaler INHALE 1-2 PUFFS BY MOUTH INTO THE LUNGS EVERY 6 HOURS AS NEEDED FOR WHEEZING. (RESCUE) 16 g 1    ALPRAZolam (XANAX) 0.25 MG tablet Take 1 tablet (0.25 mg total) by mouth 2 (two) times daily as needed for Anxiety. 15 tablet 0    azithromycin (Z-PORTIA) 250 MG tablet Take 2 tablets by mouth on day 1; Take 1 tablet by mouth on days 2-5 6 tablet 0    EScitalopram oxalate (LEXAPRO) 10 MG tablet Take 1 tablet (10 mg total) by mouth once daily. 30 tablet 5    oxybutynin (DITROPAN-XL) 5 MG TR24 Take 1 tablet (5 mg total) by mouth once daily. 30 tablet 11     No facility-administered encounter medications on file as of 2/27/2024.       Allergies:  Review of patient's allergies indicates:   Allergen Reactions    Cephalosporins Anaphylaxis, Shortness Of Breath and Other (See Comments)    Pcn [penicillins]        Health Maintenance:  Immunization History   Administered Date(s) Administered    COVID-19, MRNA, LN-S, PF (Pfizer) (Purple Cap) 03/12/2021, 04/02/2021, 12/17/2021    COVID-19, mRNA, LNP-S, bivalent booster, PF (PFIZER OMICRON) 10/18/2022    Influenza - Quadrivalent - PF *Preferred* (6  months and older) 11/06/2020, 12/11/2021    Tdap 11/06/2020      Health Maintenance   Topic Date Due    Mammogram  10/20/2024    TETANUS VACCINE  11/06/2030    Hepatitis C Screening  Completed    Lipid Panel  Completed        Physical Exam       ]    Physical Exam  Skin:     General: Skin is warm and dry.   Neurological:      General: No focal deficit present.      Mental Status: She is alert.          Laboratory:  CBC:  Recent Labs   Lab 09/30/22  1107 05/03/23  0857 10/03/23  1105   WBC 6.02 6.31 6.27   RBC 4.49 4.60 4.77   Hemoglobin 13.7 13.1 13.8   Hematocrit 43.6 41.8 42.3   Platelets 216 237 239   MCV 97 91 89   MCH 30.5 28.5 28.9   MCHC 31.4 L 31.3 L 32.6     CMP:  Recent Labs   Lab 09/30/22  1107 10/03/23  1105   Glucose 120 H 123 H   Calcium 9.3 9.0   Albumin 3.5 3.6   Total Protein 6.6 6.7   Sodium 137 139   Potassium 4.1 4.2   CO2 24 26   Chloride 102 104   BUN 9 7   Alkaline Phosphatase 73 89   ALT 17 18   AST 15 17   Total Bilirubin 0.4 0.5     URINALYSIS:       LIPIDS:  Recent Labs   Lab 09/30/22  1107 10/03/23  1105   TSH 1.853  --    HDL 51 47   Cholesterol 177 179   Triglycerides 90 84   LDL Cholesterol 108.0 115.2   HDL/Cholesterol Ratio 28.8 26.3   Non-HDL Cholesterol 126 132   Total Cholesterol/HDL Ratio 3.5 3.8     TSH:  Recent Labs   Lab 09/30/22  1107   TSH 1.853     A1C:  Recent Labs   Lab 09/30/22  1107 10/03/23  1105   Hemoglobin A1C 5.9 H 6.3 H       Assessment/Plan     Basilia Delgado is a 43 y.o.female with:    1. Acute non-recurrent frontal sinusitis  - azithromycin (Z-PORTIA) 250 MG tablet; Take 2 tablets by mouth on day 1; Take 1 tablet by mouth on days 2-5  Dispense: 6 tablet; Refill: 0  -discussed continued mucinex, flonase, and continue to hydrate  - will re-assess need for medrol later this week    2. Situational anxiety   Will send in xanax for preflight anxiety.    reviewed.     Chronic conditions status updated as per HPI.  Other than changes above, cont current medications  and maintain follow up with specialists.  No follow-ups on file.    No future appointments.      Caterina Navarrete MD  [facility]

## 2024-02-29 DIAGNOSIS — F41.9 ANXIETY: ICD-10-CM

## 2024-02-29 RX ORDER — ESCITALOPRAM OXALATE 10 MG/1
10 TABLET ORAL DAILY
Qty: 30 TABLET | Refills: 5 | Status: SHIPPED | OUTPATIENT
Start: 2024-02-29 | End: 2025-02-28

## 2024-02-29 NOTE — TELEPHONE ENCOUNTER
No care due was identified.  [facility] Embedded Care Due Messages. Reference number: [id number].   2/29/2024 3:05:58 PM CST

## 2024-03-19 ENCOUNTER — OFFICE VISIT (OUTPATIENT)
Dept: PRIMARY CARE CLINIC | Facility: CLINIC | Age: 44
End: 2024-03-19
Payer: COMMERCIAL

## 2024-03-19 VITALS
WEIGHT: 260.38 LBS | DIASTOLIC BLOOD PRESSURE: 76 MMHG | SYSTOLIC BLOOD PRESSURE: 110 MMHG | HEIGHT: 65 IN | HEART RATE: 78 BPM | OXYGEN SATURATION: 98 % | BODY MASS INDEX: 43.38 KG/M2

## 2024-03-19 DIAGNOSIS — E66.01 CLASS 3 SEVERE OBESITY DUE TO EXCESS CALORIES WITHOUT SERIOUS COMORBIDITY WITH BODY MASS INDEX (BMI) OF 40.0 TO 44.9 IN ADULT: ICD-10-CM

## 2024-03-19 DIAGNOSIS — M54.2 NECK PAIN: Primary | ICD-10-CM

## 2024-03-19 DIAGNOSIS — F41.8 SITUATIONAL ANXIETY: ICD-10-CM

## 2024-03-19 DIAGNOSIS — R59.9 SWOLLEN LYMPH NODES: ICD-10-CM

## 2024-03-19 PROCEDURE — 3078F DIAST BP <80 MM HG: CPT | Mod: CPTII,S$GLB,, | Performed by: NURSE PRACTITIONER

## 2024-03-19 PROCEDURE — 3008F BODY MASS INDEX DOCD: CPT | Mod: CPTII,S$GLB,, | Performed by: NURSE PRACTITIONER

## 2024-03-19 PROCEDURE — 99999 PR PBB SHADOW E&M-EST. PATIENT-LVL III: CPT | Mod: PBBFAC,,, | Performed by: NURSE PRACTITIONER

## 2024-03-19 PROCEDURE — 99214 OFFICE O/P EST MOD 30 MIN: CPT | Mod: S$GLB,,, | Performed by: NURSE PRACTITIONER

## 2024-03-19 PROCEDURE — 1159F MED LIST DOCD IN RCRD: CPT | Mod: CPTII,S$GLB,, | Performed by: NURSE PRACTITIONER

## 2024-03-19 PROCEDURE — 3074F SYST BP LT 130 MM HG: CPT | Mod: CPTII,S$GLB,, | Performed by: NURSE PRACTITIONER

## 2024-03-19 RX ORDER — SULFAMETHOXAZOLE AND TRIMETHOPRIM 800; 160 MG/1; MG/1
1 TABLET ORAL 2 TIMES DAILY
Qty: 14 TABLET | Refills: 0 | Status: SHIPPED | OUTPATIENT
Start: 2024-03-19 | End: 2024-03-26

## 2024-03-19 NOTE — LETTER
March 19, 2024      [facility]  [address] [facility] [id number]       Patient: Basilia Delgado   YOB: 1980  Date of Visit: 03/19/2024    To Whom It May Concern:    Peter Delgado  was at [facility] on 03/19/2024. This letter is written to see if you could accommodate Mrs. Delgado to be seated towards the front of the plane due to her extreme anxiety and claustrophobia. If you have any questions or concerns, or if I can be of further assistance, please do not hesitate to contact me.    Sincerely,    Damaris Navarrete NP

## 2024-03-19 NOTE — PROGRESS NOTES
[facility] Note    Chief Complaint      Chief Complaint   Patient presents with    Neck Pain     F/u from US in October, pain on left side       History of Present Illness      Basilia Delgado is a 43 y.o. female with chronic conditions of asthma who presents today for: complaining of pain in left neck, for couple of days. Describes as achy. Swollen gland. Pt concerned. Tender to touch. Better today than yesterday. No fever or chills. Denies ear pain. Does not hurt to swallow, denies PND. Denies chest pain/sob. Denies changes in voice. Denies drooling.    Answers submitted by the patient for this visit:  Review of Systems Questionnaire (Submitted on 3/19/2024)  activity change: No  unexpected weight change: No  rhinorrhea: No  trouble swallowing: No  visual disturbance: No  chest tightness: No  polyuria: No  difficulty urinating: No  menstrual problem: No  joint swelling: No  arthralgias: No  confusion: No  dysphoric mood: No      Past Medical History:  Past Medical History:   Diagnosis Date    Asthma        Past Surgical History:   has a past surgical history that includes Adenoidectomy (1986); Appendectomy (1995); Tonsillectomy (1986); Cholecystectomy (2008); Laparoscopy; Laparotomy; and Endometrial ablation (N/A, 5/3/2023).    Family History:  family history includes Alcohol abuse in her father and paternal grandfather; Asthma in her mother; Cancer in her mother; Drug abuse in her brother; Kidney cancer in her paternal grandmother; Lung cancer in her maternal grandmother; Prostate cancer in her maternal grandfather; Skin cancer in her paternal grandfather; Thyroid cancer in her mother.     Social History:  Social History     Tobacco Use    Smoking status: Never   Substance Use Topics    Alcohol use: Never    Drug use: Never       Review of Systems   HENT:  Negative for hearing loss.    Eyes:  Negative for discharge.   Respiratory:  Negative for wheezing.    Cardiovascular:  Negative for chest  "pain and palpitations.   Gastrointestinal:  Negative for blood in stool, constipation, diarrhea and vomiting.   Genitourinary:  Negative for dysuria and hematuria.   Musculoskeletal:  Positive for neck pain.   Neurological:  Negative for weakness and headaches.   Endo/Heme/Allergies:  Negative for polydipsia.        Medications:  Outpatient Encounter Medications as of 3/19/2024   Medication Sig Dispense Refill    albuterol (PROVENTIL/VENTOLIN HFA) 90 mcg/actuation inhaler INHALE 1-2 PUFFS BY MOUTH INTO THE LUNGS EVERY 6 HOURS AS NEEDED FOR WHEEZING. (RESCUE) 16 g 1    ALPRAZolam (XANAX) 0.25 MG tablet Take 1 tablet (0.25 mg total) by mouth 2 (two) times daily as needed for Anxiety. 15 tablet 0    EScitalopram oxalate (LEXAPRO) 10 MG tablet Take 1 tablet (10 mg total) by mouth once daily. 30 tablet 5    oxybutynin (DITROPAN-XL) 5 MG TR24 Take 1 tablet (5 mg total) by mouth once daily. 30 tablet 11     No facility-administered encounter medications on file as of 3/19/2024.       Allergies:  Review of patient's allergies indicates:   Allergen Reactions    Cephalosporins Anaphylaxis, Shortness Of Breath and Other (See Comments)    Pcn [penicillins]        Health Maintenance:  Immunization History   Administered Date(s) Administered    COVID-19, MRNA, LN-S, PF (Pfizer) (Purple Cap) 03/12/2021, 04/02/2021, 12/17/2021    COVID-19, mRNA, LNP-S, bivalent booster, PF (PFIZER OMICRON) 10/18/2022    Influenza - Quadrivalent - PF *Preferred* (6 months and older) 11/06/2020, 12/11/2021    Tdap 11/06/2020      Health Maintenance   Topic Date Due    Mammogram  10/20/2024    TETANUS VACCINE  11/06/2030    Hepatitis C Screening  Completed    Lipid Panel  Completed        Physical Exam      Vital Signs  Pulse: 78  SpO2: 98 %  BP: 110/76  BP Location: Left arm  Patient Position: Sitting  Height and Weight  Height: 5' 5" (165.1 cm)  Weight: 118.1 kg (260 lb 5.8 oz)  BSA (Calculated - sq m): 2.33 sq meters  BMI (Calculated): 43.3  Weight " in (lb) to have BMI = 25: 149.9]    Physical Exam  Constitutional:       Appearance: She is well-developed.   HENT:      Head: Normocephalic and atraumatic.      Right Ear: Tympanic membrane normal.      Left Ear: Tympanic membrane normal.      Nose: Nose normal.      Mouth/Throat:      Mouth: Mucous membranes are moist.   Cardiovascular:      Rate and Rhythm: Normal rate and regular rhythm.      Heart sounds: Normal heart sounds. No murmur heard.  Pulmonary:      Effort: Pulmonary effort is normal. No respiratory distress.      Breath sounds: Normal breath sounds.   Abdominal:      General: There is no distension.      Palpations: Abdomen is soft.      Tenderness: There is no abdominal tenderness. There is no guarding.   Musculoskeletal:      Cervical back: Normal range of motion and neck supple. Tenderness present.   Lymphadenopathy:      Cervical: Cervical adenopathy present.   Skin:     General: Skin is warm and dry.   Neurological:      Mental Status: She is alert. Mental status is at baseline.   Psychiatric:         Behavior: Behavior normal.          Laboratory:  CBC:  Recent Labs   Lab 09/30/22  1107 05/03/23  0857 10/03/23  1105   WBC 6.02 6.31 6.27   RBC 4.49 4.60 4.77   Hemoglobin 13.7 13.1 13.8   Hematocrit 43.6 41.8 42.3   Platelets 216 237 239   MCV 97 91 89   MCH 30.5 28.5 28.9   MCHC 31.4 L 31.3 L 32.6     CMP:  Recent Labs   Lab 09/30/22  1107 10/03/23  1105   Glucose 120 H 123 H   Calcium 9.3 9.0   Albumin 3.5 3.6   Total Protein 6.6 6.7   Sodium 137 139   Potassium 4.1 4.2   CO2 24 26   Chloride 102 104   BUN 9 7   Alkaline Phosphatase 73 89   ALT 17 18   AST 15 17   Total Bilirubin 0.4 0.5     URINALYSIS:       LIPIDS:  Recent Labs   Lab 09/30/22  1107 10/03/23  1105   TSH 1.853  --    HDL 51 47   Cholesterol 177 179   Triglycerides 90 84   LDL Cholesterol 108.0 115.2   HDL/Cholesterol Ratio 28.8 26.3   Non-HDL Cholesterol 126 132   Total Cholesterol/HDL Ratio 3.5 3.8     TSH:  Recent Labs   Lab  09/30/22  1107   TSH 1.853     A1C:  Recent Labs   Lab 09/30/22  1107 10/03/23  1105   Hemoglobin A1C 5.9 H 6.3 H       Assessment/Plan     Basilia Delgado is a 43 y.o.female with:    1. Neck pain  - US Soft Tissue Head Neck; Future  - advised follow up with ENT if no improvement.  -advised alternating tylenol/ibuprofen   - going out of town this week, given bactrim in case symptoms worsen or become red/hot/swollen.     2. Swollen lymph nodes  - US Soft Tissue Head Neck; Future    3. Class 3 severe obesity due to excess calories without serious comorbidity with body mass index (BMI) of 40.0 to 44.9 in adult  Stable. Discussed diet and exercise     4. Situational anxiety   On lexapro, but has xanax as needed for pre flight anxiety.   Will write letter for pre-flight boarding status to see if helps with seating.    Chronic conditions status updated as per HPI.  Other than changes above, cont current medications and maintain follow up with specialists.  No follow-ups on file.    Future Appointments   Date Time Provider Department Center   3/27/2024  1:45 PM [facility] US2 [facility] [place]   5/9/2024 10:45 AM Leeann Green MD [facility] [place] Adreinne Cotaya, FNP [facility]

## 2024-04-17 ENCOUNTER — HOSPITAL ENCOUNTER (OUTPATIENT)
Dept: RADIOLOGY | Facility: HOSPITAL | Age: 44
Discharge: HOME | End: 2024-04-17
Attending: NURSE PRACTITIONER
Payer: COMMERCIAL

## 2024-04-17 DIAGNOSIS — M54.2 NECK PAIN: ICD-10-CM

## 2024-04-17 DIAGNOSIS — R59.9 SWOLLEN LYMPH NODES: ICD-10-CM

## 2024-04-17 PROCEDURE — 76536 US EXAM OF HEAD AND NECK: CPT | Mod: TC

## 2024-04-17 PROCEDURE — 76536 US EXAM OF HEAD AND NECK: CPT | Mod: 26,,, | Performed by: RADIOLOGY

## 2024-04-18 ENCOUNTER — OFFICE VISIT (OUTPATIENT)
Dept: OTOLARYNGOLOGY | Facility: CLINIC | Age: 44
End: 2024-04-18
Payer: COMMERCIAL

## 2024-04-18 VITALS
WEIGHT: 256.5 LBS | HEART RATE: 89 BPM | BODY MASS INDEX: 42.68 KG/M2 | SYSTOLIC BLOOD PRESSURE: 136 MMHG | DIASTOLIC BLOOD PRESSURE: 78 MMHG

## 2024-04-18 DIAGNOSIS — R06.83 SNORING: ICD-10-CM

## 2024-04-18 DIAGNOSIS — M26.623 BILATERAL TEMPOROMANDIBULAR JOINT PAIN: Chronic | ICD-10-CM

## 2024-04-18 DIAGNOSIS — K21.9 LARYNGOPHARYNGEAL REFLUX (LPR): Chronic | ICD-10-CM

## 2024-04-18 DIAGNOSIS — J31.0 CHRONIC RHINITIS: ICD-10-CM

## 2024-04-18 DIAGNOSIS — M54.2 NECK PAIN: Primary | Chronic | ICD-10-CM

## 2024-04-18 PROCEDURE — 99999 PR PBB SHADOW E&M-EST. PATIENT-LVL III: CPT | Mod: PBBFAC,,, | Performed by: OTOLARYNGOLOGY

## 2024-04-18 PROCEDURE — 3078F DIAST BP <80 MM HG: CPT | Mod: CPTII,S$GLB,, | Performed by: OTOLARYNGOLOGY

## 2024-04-18 PROCEDURE — 3008F BODY MASS INDEX DOCD: CPT | Mod: CPTII,S$GLB,, | Performed by: OTOLARYNGOLOGY

## 2024-04-18 PROCEDURE — 99204 OFFICE O/P NEW MOD 45 MIN: CPT | Mod: 25,S$GLB,, | Performed by: OTOLARYNGOLOGY

## 2024-04-18 PROCEDURE — 3075F SYST BP GE 130 - 139MM HG: CPT | Mod: CPTII,S$GLB,, | Performed by: OTOLARYNGOLOGY

## 2024-04-18 PROCEDURE — 1159F MED LIST DOCD IN RCRD: CPT | Mod: CPTII,S$GLB,, | Performed by: OTOLARYNGOLOGY

## 2024-04-18 PROCEDURE — 31575 DIAGNOSTIC LARYNGOSCOPY: CPT | Mod: S$GLB,,, | Performed by: OTOLARYNGOLOGY

## 2024-04-18 RX ORDER — OMEPRAZOLE 40 MG/1
40 CAPSULE, DELAYED RELEASE ORAL EVERY MORNING
Qty: 30 CAPSULE | Refills: 11 | Status: SHIPPED | OUTPATIENT
Start: 2024-04-18 | End: 2025-04-18

## 2024-04-18 RX ORDER — FLUTICASONE PROPIONATE 50 MCG
1 SPRAY, SUSPENSION (ML) NASAL 2 TIMES DAILY
Qty: 11.1 ML | Refills: 11 | Status: SHIPPED | OUTPATIENT
Start: 2024-04-18 | End: 2024-05-18

## 2024-04-18 RX ORDER — CETIRIZINE HYDROCHLORIDE 10 MG/1
10 TABLET ORAL DAILY
Qty: 30 TABLET | Refills: 12 | Status: SHIPPED | OUTPATIENT
Start: 2024-04-18 | End: 2025-04-18

## 2024-04-18 NOTE — PROGRESS NOTES
Chief Complaint   Patient presents with    Other Misc     Recurrent Throat Pain         HPI: Basilia Delgado is a 43 y.o. female who has been referred by Damaris Navarrete NP for a few months history of throat pain on the left side.  She notes the pain is intermittent, and it only occurs with lying on the left side and with neck movements, as well as occasionally with swallowing. Her voice is not progressively worsening over this time. There are not pitch breaks or cracks. There is not vocal fatigue. She denies otalgia.  She has occasional globus and dysphagia, but not chronic.  There is no hemoptysis or hematemesis. She is breathing well.     She denies throat clearing and cough. She has heartburn and reflux.    She does have bruxism and jaw pain that has worsened over the last few months due to stress.      She also has chronic congestion and PND.  She does not use any nasal sprays or allergy medications daily.    She reports snoring nightly, but she is unaware of apneas or restless sleep.                US SOFT TISSUE HEAD NECK- reviewed findings with patient today     CLINICAL HISTORY:  .  Cervicalgia     TECHNIQUE:  Ultrasound of the thyroid and cervical lymph nodes was performed.     COMPARISON:  Thyroid ultrasound 10/24/2023     FINDINGS:  The thyroid is normal in size.  Background thyroid parenchyma is homogeneous.  Normal vascularity.     Right lobe of the thyroid measures 3.8 x 1.2 x 1.5 cm.     Upper right lobe anechoic, cystic nodule measuring 0.3 x 0.1 x 0.2 cm.     Isthmus measures 0.2 cm.     Left lobe of the thyroid measures 3.0 x 1.7 x 1.0 cm.     Mid left lobe hypoechoic nodule measuring 0.4 x 0.3 x 0.4 cm.     Cervical lymph nodes demonstrate normal morphology and size.     Impression:     Normal thyroid ultrasound.     Small nodules bilaterally that do not meet criteria for follow-up.      Past Medical History:   Diagnosis Date    Asthma      Social History     Socioeconomic History    Marital  status:    Occupational History    Occupation: works for councilman     Employer: [employer]   Tobacco Use    Smoking status: Never   Substance and Sexual Activity    Alcohol use: Never    Drug use: Never    Sexual activity: Yes     Partners: Male     Birth control/protection: Partner-Vasectomy     Past Surgical History:   Procedure Laterality Date    ADENOIDECTOMY  1986    APPENDECTOMY  1995    CHOLECYSTECTOMY  2008    ENDOMETRIAL ABLATION N/A 5/3/2023    Procedure: ABLATION, ENDOMETRIUM;  Surgeon: Vicki Mosley MD;  Location: [facility];  Service: OB/GYN;  Laterality: N/A;    LAPAROSCOPY      x 5    LAPAROTOMY      x 1    TONSILLECTOMY  1986     Family History   Problem Relation Name Age of Onset    Asthma Mother Mother     Thyroid cancer Mother Mother     Cancer Mother Mother     Alcohol abuse Father Father     Drug abuse Brother Brother     Lung cancer Maternal Grandmother      Prostate cancer Maternal Grandfather      Kidney cancer Paternal Grandmother      Alcohol abuse Paternal Grandfather Grandfather     Skin cancer Paternal Grandfather Grandfather            Review of Systems  General: negative for chills, fever or weight loss  Psychological: negative for mood changes or depression  Ophthalmic: negative for blurry vision, photophobia or eye pain  ENT: see HPI  Respiratory: no cough, shortness of breath, or wheezing  Cardiovascular: no chest pain or dyspnea on exertion  Gastrointestinal: no abdominal pain, change in bowel habits, or black/ bloody stools  Musculoskeletal: negative for gait disturbance or muscular weakness  Neurological: no syncope or seizures; no ataxia  Dermatological: negative for pruritis,  rash and jaundice  Hematologic/lymphatic: no easy bruising, no new adenopathy      Physical Exam:    Vitals:    04/18/24 1036   BP: 136/78   Pulse: 89         Constitutional:   She is oriented to person, place, and time. Vital signs are normal. She appears well-developed and well-nourished.  She appears alert. She is cooperative.  Non-toxic appearance. Normal speech.      Head:  Normocephalic and atraumatic. Head is with TMJ tenderness (bilateral, singificant pain with opening jaw; TTP intraorally at pterygoings). Salivary glands normal.  Facial strength is normal.      Ears:  Hearing normal to normal and whispered voice; external ear normal without scars, lesions, or masses; ear canal, tympanic membrane, and middle ear normal., right ear hearing normal to normal and whispered voice; external ear normal without scars, lesions, or masses; ear canal, tympanic membrane, and middle ear normal. and left ear hearing normal to normal and whispered voice; external ear normal without scars, lesions, or masses; ear canal, tympanic membrane, and middle ear normal..   Right Ear: Tympanic membrane is not perforated, not erythematous and not retracted. No middle ear effusion.   Left Ear: Tympanic membrane is not perforated, not erythematous and not retracted.  No middle ear effusion.     Nose:  Mucosal edema and rhinorrhea present. No septal deviation, nasal septal hematoma or polyps. No epistaxis. Turbinate hypertrophy (3+, congested, boggy).  No turbinate masses.  Right sinus exhibits no maxillary sinus tenderness and no frontal sinus tenderness. Left sinus exhibits no maxillary sinus tenderness and no frontal sinus tenderness.     Mouth/Throat  Oropharynx clear and moist without lesions or asymmetry, normal uvula midline, lips, teeth, and gums normal and oropharynx normal. Normal dentition. No uvula swelling or oral lesions. No oropharyngeal exudate, posterior oropharyngeal edema or posterior oropharyngeal erythema. Mirror exam not performed due to patient tolerance.  Mirror exam not performed due to patient tolerance.      Neck:  Neck normal without thyromegaly masses, asymmetry, normal tracheal structure, crepitus, and tenderness, thyroid normal, trachea normal, full range of motion with neck supple and no  adenopathy. No JVD present. Carotid bruit is not present. Thyroid tenderness is present. No edema and no erythema present. No thyroid mass and no thyromegaly present.     She has no cervical adenopathy.         Cardiovascular:    Normal rate, regular rhythm, normal heart sounds and rate and rhythm, heart sounds, and pulses normal.              Pulmonary/Chest:   Effort and breath sounds normal.     Psychiatric:   She has a normal mood and affect. Her speech is normal and behavior is normal.     Neurological:   She is alert and oriented to person, place, and time. She has neurological normal, alert and oriented. No cranial nerve deficit.     Skin:   No abrasions, lacerations, lesions, or rashes.         Laryngoscopy    Date/Time: 4/18/2024 10:40 AM    Performed by: Anahy Ferreira MD  Authorized by: Anahy Ferreira MD    Consent Done?:  Yes (Verbal)  Anesthesia:     Local anesthetic:  Topical anesthetic    Patient tolerance:  Patient tolerated the procedure well with no immediate complications    Decongestion performed?: Yes    Laryngoscopy:     Areas examined:  Nasal cavities, vocal cords, nasopharynx, oropharynx, hypopharynx and larynx  Nose External:      No external nasal deformity  Nose Intranasal:      Mucosa polyps (polypoid appearing mucosa/lesion on the right middle turbinate, no other polyps or polypoid areas in either nasal cavity)     Mucosa ulcers not present     No mucosa lesions present     Septum gross deformity     Enlarged turbinates  Nasopharynx:      No mucosa lesions     Adenoids not present     Posterior choanae patent     Eustachian tube patent  Larynx/hypopharynx:      No epiglottis lesions     No epiglottis edema     No AE folds lesions     No vocal cord polyps     Equal and normal bilateral     No hypopharynx lesions     No piriform sinus pooling     No piriform sinus lesions     Post cricoid edema     Post cricoid erythema       Erythema of supraglottis and postcricoid, consistent with  LPR.    No lesions of the airway or aerodigestive mucosa.            Assessment:    ICD-10-CM ICD-9-CM    1. Neck pain  M54.2 723.1 Ambulatory referral/consult to ENT      CT Soft Tissue Neck W WO Contrast      2. Laryngopharyngeal reflux (LPR)  K21.9 478.79 Ambulatory referral/consult to ENT      3. Bilateral temporomandibular joint pain  M26.623 524.62       4. Snoring  R06.83 786.09       5. Chronic rhinitis  J31.0 472.0         The primary encounter diagnosis was Neck pain. Diagnoses of Laryngopharyngeal reflux (LPR), Bilateral temporomandibular joint pain, Snoring, and Chronic rhinitis were also pertinent to this visit.      Plan:    CT scan of neck to evaluate anatomy in the area of the pain.   The patient was given a prescription for a nasal steroid and/or nasal antihistamine nasal spray and we discussed in detail the proper mechanism of use directing the spray away from the nasal septum.  The patient was also instructed to take a daily oral antihistamine (Claritin, Zyrtec, Allegra, or Xyzal).    In addition, we also discussed that it will take 3-4 weeks of daily use to achieve maximal effectiveness.  The patient will please call in 3-4 weeks with their progress.  If allergy symptoms persist at that time, we could consider additional medications and possibly allergy testing.     We had a long discussion regarding the underlying pathology of temporomandibular joint dysfunction (TMD) as the cause of ear pain.  We further discussed conservative measures to treat TMD including avoiding gum and other foods that require lots of chewing, warm compresses, and scheduled antinflammatories (such as Motrin, Ibuprofen, or Aleve).  The patient should also wear a  (purchased OTC or see dentist for custom), which prevents additional pressure on the TM joint.  Stress can also exacerbate TMJ symptoms.    If the pain persists, the patient will then schedule an appointment with a dentist for further evaluation.    The  patient was given printed materials describing symptoms of reflux, as well as lifestyle modifications to decrease reflux.  I also wrote a prescription for antireflux medications.  If symptoms persist after 4-6 weeks, she will let me know and we will arrange for additional followup with GI.    Follow up in a few weeks and we will discuss further workup at that time for snoring and to reassess the polypoid area in the right nasal cavity.        Anahy Ferreira MD

## 2024-04-18 NOTE — PATIENT INSTRUCTIONS
CT neck ordered to assess anatomy of the area.     The patient was given a prescription for a nasal steroid and/or nasal antihistamine nasal spray and we discussed in detail the proper mechanism of use directing the spray away from the nasal septum.  The patient was also instructed to take a daily oral antihistamine (Claritin, Zyrtec, Allegra, or Xyzal).    In addition, we also discussed that it will take 3-4 weeks of daily use to achieve maximal effectiveness.  The patient will please call in 3-4 weeks with their progress.  If allergy symptoms persist at that time, we could consider additional medications and possibly allergy testing.     How do you use a Nasal Corticosteroid Spray?    Make sure you understand your dosing instructions. Spray only the number of prescribed sprays in each nostril. Read the package instructions before using your spray the first time.    Most corticosteroid sprays suggest the following steps:    Wash your hands well.    Gently blow your nose to clear the passageway.    Shake the container several times.    Tilt your head slightly downward.  Use the opposite hand from the nostril you will be spraying to hold the spray bottle.    Block one nostril with your finger.  Insert the nasal applicator into the other nostril.    Aim the spray toward the outer wall of the nostril.  Inhale slowly through the nose and press the .    Breathe out and repeat to apply the prescribed number of sprays.  Repeat these steps for the other nostril.     Avoid sneezing or blowing your nose right after spraying.          We had a long discussion regarding the underlying pathology of temporomandibular joint dysfunction (TMD) as the cause of ear pain.  We further discussed conservative measures to treat TMD including avoiding gum and other foods that require lots of chewing, warm compresses, and scheduled antinflammatories (such as Motrin, Ibuprofen, or Aleve).  The patient should also wear a night  guard (purchased OTC or see dentist for custom), which prevents additional pressure on the TM joint.  Stress can also exacerbate TMJ symptoms.    If the pain persists, the patient will then schedule an appointment with a dentist for further evaluation.    Instructions for Reflux:  You were provided with printed materials describing symptoms of reflux, as well as lifestyle modifications and diet changes to decrease reflux.    You were also prescribed antireflux medications.    If symptoms persist after 4-6 weeks, you may let my office know, and we will arrange for additional followup with myself and/or GI.

## 2024-05-06 ENCOUNTER — HOSPITAL ENCOUNTER (OUTPATIENT)
Dept: RADIOLOGY | Facility: HOSPITAL | Age: 44
Discharge: HOME | End: 2024-05-06
Attending: ORTHOPAEDIC SURGERY
Payer: COMMERCIAL

## 2024-05-06 ENCOUNTER — OFFICE VISIT (OUTPATIENT)
Dept: ORTHOPEDICS | Facility: CLINIC | Age: 44
End: 2024-05-06
Payer: COMMERCIAL

## 2024-05-06 VITALS — BODY MASS INDEX: 44.2 KG/M2 | WEIGHT: 265.31 LBS | HEIGHT: 65 IN

## 2024-05-06 DIAGNOSIS — M47.816 LUMBAR SPONDYLOSIS: ICD-10-CM

## 2024-05-06 DIAGNOSIS — M51.36 DDD (DEGENERATIVE DISC DISEASE), LUMBAR: Primary | ICD-10-CM

## 2024-05-06 DIAGNOSIS — M54.50 LOW BACK PAIN, UNSPECIFIED BACK PAIN LATERALITY, UNSPECIFIED CHRONICITY, UNSPECIFIED WHETHER SCIATICA PRESENT: ICD-10-CM

## 2024-05-06 DIAGNOSIS — M54.50 LOW BACK PAIN, UNSPECIFIED BACK PAIN LATERALITY, UNSPECIFIED CHRONICITY, UNSPECIFIED WHETHER SCIATICA PRESENT: Primary | ICD-10-CM

## 2024-05-06 PROCEDURE — 3008F BODY MASS INDEX DOCD: CPT | Mod: CPTII,S$GLB,, | Performed by: ORTHOPAEDIC SURGERY

## 2024-05-06 PROCEDURE — 1160F RVW MEDS BY RX/DR IN RCRD: CPT | Mod: CPTII,S$GLB,, | Performed by: ORTHOPAEDIC SURGERY

## 2024-05-06 PROCEDURE — 72110 X-RAY EXAM L-2 SPINE 4/>VWS: CPT | Mod: 26,,, | Performed by: RADIOLOGY

## 2024-05-06 PROCEDURE — 1159F MED LIST DOCD IN RCRD: CPT | Mod: CPTII,S$GLB,, | Performed by: ORTHOPAEDIC SURGERY

## 2024-05-06 PROCEDURE — 99999 PR PBB SHADOW E&M-EST. PATIENT-LVL III: CPT | Mod: PBBFAC,,, | Performed by: ORTHOPAEDIC SURGERY

## 2024-05-06 PROCEDURE — 99204 OFFICE O/P NEW MOD 45 MIN: CPT | Mod: S$GLB,,, | Performed by: ORTHOPAEDIC SURGERY

## 2024-05-06 PROCEDURE — 72110 X-RAY EXAM L-2 SPINE 4/>VWS: CPT | Mod: TC

## 2024-05-06 RX ORDER — MELOXICAM 15 MG/1
15 TABLET ORAL DAILY
Qty: 60 TABLET | Refills: 1 | Status: SHIPPED | OUTPATIENT
Start: 2024-05-06

## 2024-05-06 RX ORDER — GABAPENTIN 300 MG/1
300 CAPSULE ORAL 3 TIMES DAILY
Qty: 60 CAPSULE | Refills: 1 | Status: SHIPPED | OUTPATIENT
Start: 2024-05-06

## 2024-05-06 RX ORDER — CYCLOBENZAPRINE HCL 10 MG
10 TABLET ORAL 3 TIMES DAILY PRN
Qty: 60 TABLET | Refills: 1 | Status: SHIPPED | OUTPATIENT
Start: 2024-05-06

## 2024-05-06 RX ORDER — METHYLPREDNISOLONE 4 MG/1
TABLET ORAL
Qty: 21 TABLET | Refills: 0 | Status: SHIPPED | OUTPATIENT
Start: 2024-05-06
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
## 2024-05-13 ENCOUNTER — CLINICAL SUPPORT (OUTPATIENT)
Dept: REHABILITATION | Facility: HOSPITAL | Age: 44
End: 2024-05-13
Payer: COMMERCIAL

## 2024-05-13 DIAGNOSIS — R29.898 DECREASED STRENGTH OF LOWER EXTREMITY: ICD-10-CM

## 2024-05-13 DIAGNOSIS — M53.86 DECREASED ROM OF LUMBAR SPINE: Primary | ICD-10-CM

## 2024-05-13 DIAGNOSIS — M51.36 DDD (DEGENERATIVE DISC DISEASE), LUMBAR: ICD-10-CM

## 2024-05-13 PROCEDURE — 97161 PT EVAL LOW COMPLEX 20 MIN: CPT | Mod: PN

## 2024-05-13 PROCEDURE — 97110 THERAPEUTIC EXERCISES: CPT | Mod: PN

## 2024-05-14 PROBLEM — M53.86 DECREASED ROM OF LUMBAR SPINE: Status: ACTIVE | Noted: 2024-05-14

## 2024-05-14 PROBLEM — R29.898 DECREASED STRENGTH OF LOWER EXTREMITY: Status: ACTIVE | Noted: 2024-05-14
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
## 2024-05-22 ENCOUNTER — CLINICAL SUPPORT (OUTPATIENT)
Dept: REHABILITATION | Facility: HOSPITAL | Age: 44
End: 2024-05-22
Payer: COMMERCIAL

## 2024-05-22 DIAGNOSIS — M53.86 DECREASED ROM OF LUMBAR SPINE: Primary | ICD-10-CM

## 2024-05-22 DIAGNOSIS — R29.898 DECREASED STRENGTH OF LOWER EXTREMITY: ICD-10-CM

## 2024-05-22 PROCEDURE — 97110 THERAPEUTIC EXERCISES: CPT | Mod: PN

## 2024-05-22 PROCEDURE — 97112 NEUROMUSCULAR REEDUCATION: CPT | Mod: PN

## 2024-05-22 PROCEDURE — 97530 THERAPEUTIC ACTIVITIES: CPT | Mod: PN

## 2024-05-22 NOTE — PROGRESS NOTES
"[facility]   Physical Therapy Treatment Note      Name: Basilia Austin Replaced by [facility]  Clinic Number: [id number]    Therapy Diagnosis:   Encounter Diagnoses   Name Primary?    Decreased ROM of lumbar spine Yes    Decreased strength of lower extremity      Physician: Mendoza Mohr MD    Visit Date: 5/22/2024    Physician Orders: PT Eval and Treat   Medical Diagnosis from Referral: M51.36 (ICD-10-CM) - DDD (degenerative disc disease), lumbar   Evaluation Date: 5/13/2024  Authorization Period Expiration: 12/31/24  Plan of Care Expiration: 7/12/24  Visit # / Visits authorized: 1/ 20  FOTO: 2/10  PTA Visit #: 0/5     Time In: 11:01  Time Out: 11:54  Total Billable Time: 53 minutes ( Therapeutic Exercise - 1, NM 2, TA1)     Precautions: Standard, anxiety    Subjective     Patient reports: no complaints of pain today. Has been using legs more when she squats.   She was compliant with home exercise program.  Response to previous treatment: eval   Functional change: ongoing    Pain: 1/10  Location: right back      Objective      Objective Measures updated at progress report unless specified.     Treatment     Basilia received the treatments listed below:      therapeutic exercises to develop strength, endurance, ROM, flexibility, posture, and core stabilization for 15 minutes including:  Lower trunk rotation 20x  Open book 15x  Clamshell red theraband 2x10   Sidelying hip abduction 2x10  Pallof press blue theraband  15x bilateral   Nustep 7'    neuromuscular re-education activities to improve: Balance, Coordination, Kinesthetic, Sense, Proprioception, and Posture for 23 minutes. The following activities were included:  Transverse abdominus activation 20x  Transverse abdominus activation ball squeeze 2' 5" hold   BKFO red theraband 2'    Transverse abdominus activation march- pain right on mat-held    therapeutic activities to improve functional performance for 15  minutes, including:  Hip hinge 10x -graded " exposure to flexion -8lb KB next   Standing hip extension 2x10  Standing hip abduction 2x10   Squat to 24in box 2x10   SAPD 2x10 green theraband -->blue next  SAPD march 2x10 green theraband   Next: seated hip hinge     Patient Education and Home Exercises       Education provided:   - continue home exercise program     Written Home Exercises Provided: Patient instructed to cont prior HEP. Exercises were reviewed and Basilia was able to demonstrate them prior to the end of the session.  Basilia demonstrated good  understanding of the education provided. See Electronic Medical Record under Patient Instructions for exercises provided during therapy sessions    Assessment     Basilia is a 43 y.o. female referred to outpatient Physical Therapy with a medical diagnosis of  DDD (degenerative disc disease), lumbar. Patient presents for first follow up visit with minimal complaints of pain. Continues to demonstrate flexion aversion. Working on graded exposure to flexion with hip hinge performed in tolerable range. Good form with min cuing. Treatment incorporated trunk activation and core motor control with good tolerance. Continue to progress as tolerated.     Basilia Is progressing well towards her goals.   Patient prognosis is Good.     Patient will continue to benefit from skilled outpatient physical therapy to address the deficits listed in the problem list box on initial evaluation, provide pt/family education and to maximize pt's level of independence in the home and community environment.     Patient's spiritual, cultural and educational needs considered and pt agreeable to plan of care and goals.     Anticipated barriers to physical therapy: chronicity, co-morbidities, lifestyle, fear avoidance     Goals: Short Term Goals (4 Weeks):  1. Pt will be compliant with HEP to supplement PT in decreasing pain with functional mobility.  2. Pt will perform hip hinge with good control to demonstrate improved core strength.  3. Pt  will report 25% improvement in thoracolumbar ROM to promote functional mobility.  4. Pt will improve impaired LE MMTs to >/=4/5 to improve strength for functional tasks.  Long Term Goals (8 Weeks):   1. Pt will improve FOTO score to </= 31% limited to decrease perceived limitation with maintaining/changing body position.   2. Pt will perform Sahrmann L3 with good control to demonstrate improved core strength.  3. Pt will improve impaired LE MMTs to >/=4+/5 to improve strength for functional tasks.  4. Pt will report no pain during bending and lifting to promote tolerance for household chores.    Plan     Plan of care Certification: 5/13/2024 to 7/12/24.     BLOSSOM Russell, PT

## 2024-06-20 ENCOUNTER — OFFICE VISIT (OUTPATIENT)
Dept: OTOLARYNGOLOGY | Facility: CLINIC | Age: 44
End: 2024-06-20
Payer: COMMERCIAL

## 2024-06-20 ENCOUNTER — PATIENT MESSAGE (OUTPATIENT)
Dept: OTOLARYNGOLOGY | Facility: CLINIC | Age: 44
End: 2024-06-20

## 2024-06-20 VITALS
HEART RATE: 94 BPM | DIASTOLIC BLOOD PRESSURE: 81 MMHG | WEIGHT: 267.75 LBS | SYSTOLIC BLOOD PRESSURE: 125 MMHG | BODY MASS INDEX: 44.56 KG/M2

## 2024-06-20 DIAGNOSIS — J31.0 CHRONIC RHINITIS: Primary | Chronic | ICD-10-CM

## 2024-06-20 DIAGNOSIS — J33.0 NASAL CAVITY POLYP: ICD-10-CM

## 2024-06-20 DIAGNOSIS — J34.2 DEVIATED NASAL SEPTUM: ICD-10-CM

## 2024-06-20 DIAGNOSIS — K13.70 LESION OF ORAL MUCOSA: ICD-10-CM

## 2024-06-20 DIAGNOSIS — J34.3 HYPERTROPHY OF BOTH INFERIOR NASAL TURBINATES: ICD-10-CM

## 2024-06-20 DIAGNOSIS — K21.9 LARYNGOPHARYNGEAL REFLUX (LPR): Chronic | ICD-10-CM

## 2024-06-20 PROCEDURE — 31231 NASAL ENDOSCOPY DX: CPT | Mod: S$GLB,,, | Performed by: OTOLARYNGOLOGY

## 2024-06-20 PROCEDURE — 3008F BODY MASS INDEX DOCD: CPT | Mod: CPTII,S$GLB,, | Performed by: OTOLARYNGOLOGY

## 2024-06-20 PROCEDURE — 3074F SYST BP LT 130 MM HG: CPT | Mod: CPTII,S$GLB,, | Performed by: OTOLARYNGOLOGY

## 2024-06-20 PROCEDURE — 3079F DIAST BP 80-89 MM HG: CPT | Mod: CPTII,S$GLB,, | Performed by: OTOLARYNGOLOGY

## 2024-06-20 PROCEDURE — 99999 PR PBB SHADOW E&M-EST. PATIENT-LVL III: CPT | Mod: PBBFAC,,, | Performed by: OTOLARYNGOLOGY

## 2024-06-20 PROCEDURE — 1159F MED LIST DOCD IN RCRD: CPT | Mod: CPTII,S$GLB,, | Performed by: OTOLARYNGOLOGY

## 2024-06-20 PROCEDURE — 1160F RVW MEDS BY RX/DR IN RCRD: CPT | Mod: CPTII,S$GLB,, | Performed by: OTOLARYNGOLOGY

## 2024-06-20 PROCEDURE — 99214 OFFICE O/P EST MOD 30 MIN: CPT | Mod: 25,S$GLB,, | Performed by: OTOLARYNGOLOGY

## 2024-06-20 RX ORDER — OMEPRAZOLE 20 MG/1
20 CAPSULE, DELAYED RELEASE ORAL EVERY MORNING
Qty: 30 CAPSULE | Refills: 11 | Status: SHIPPED | OUTPATIENT
Start: 2024-06-20 | End: 2025-06-20

## 2024-06-20 RX ORDER — FAMOTIDINE 40 MG/1
20 TABLET, FILM COATED ORAL NIGHTLY
Qty: 15 TABLET | Refills: 11 | Status: SHIPPED | OUTPATIENT
Start: 2024-06-20 | End: 2025-06-20

## 2024-06-20 NOTE — PATIENT INSTRUCTIONS
Continue nasal spray and allergy medications.     Wean prilosec and add pepcid nightly.     CT scan of sinuses to assess polyp area on right nasal turbinate/nasal cavity.      Schedule minor procedure for excision of right oral cavity lesion.

## 2024-06-20 NOTE — PROGRESS NOTES
Chief Complaint   Patient presents with    Follow-up     Great Improvement No Reflux        HPI: Basilia Delgado is a 43 y.o. female who has been referred by Damaris Navarrete NP for a few months history of throat pain on the left side.  She notes the pain is intermittent, and it only occurs with lying on the left side and with neck movements, as well as occasionally with swallowing. Her voice is not progressively worsening over this time. There are not pitch breaks or cracks. There is not vocal fatigue. She denies otalgia.  She has occasional globus and dysphagia, but not chronic.  There is no hemoptysis or hematemesis. She is breathing well.     She denies throat clearing and cough. She has heartburn and reflux.    She does have bruxism and jaw pain that has worsened over the last few months due to stress.      She also has chronic congestion and PND.  She does not use any nasal sprays or allergy medications daily.    She reports snoring nightly, but she is unaware of apneas or restless sleep.            Interval HPI 6/20/2024:       Patient reports that the neck and throat pain she was previously experiencing has improved.  She has had significant reduction in her throat symptoms with use of LPR medications and has continued to take those.  She is continued to use nasal sprays for her rhinitis symptoms and does feel that these help.    She does feel some mild symptoms of decreased air flow in the right nasal cavity, but not significantly worsening  since last visit.    Her TMJ tenderness has not been an issue significantly since last visit.        Past Medical History:   Diagnosis Date    Asthma      Social History     Socioeconomic History    Marital status:    Occupational History    Occupation: works for councilman     Employer: [employer]   Tobacco Use    Smoking status: Never   Substance and Sexual Activity    Alcohol use: Never    Drug use: Never    Sexual activity: Yes     Partners: Male      Birth control/protection: Partner-Vasectomy     Past Surgical History:   Procedure Laterality Date    ADENOIDECTOMY  1986    APPENDECTOMY  1995    CHOLECYSTECTOMY  2008    ENDOMETRIAL ABLATION N/A 5/3/2023    Procedure: ABLATION, ENDOMETRIUM;  Surgeon: Vicki Mosley MD;  Location: [facility];  Service: OB/GYN;  Laterality: N/A;    LAPAROSCOPY      x 5    LAPAROTOMY      x 1    TONSILLECTOMY  1986     Family History   Problem Relation Name Age of Onset    Asthma Mother Mother     Thyroid cancer Mother Mother     Cancer Mother Mother     Alcohol abuse Father Father     Drug abuse Brother Brother     Lung cancer Maternal Grandmother      Prostate cancer Maternal Grandfather      Kidney cancer Paternal Grandmother      Alcohol abuse Paternal Grandfather Grandfather     Skin cancer Paternal Grandfather Grandfather            Review of Systems  General: negative for chills, fever or weight loss  Psychological: negative for mood changes or depression  Ophthalmic: negative for blurry vision, photophobia or eye pain  ENT: see HPI  Respiratory: no cough, shortness of breath, or wheezing  Cardiovascular: no chest pain or dyspnea on exertion  Gastrointestinal: no abdominal pain, change in bowel habits, or black/ bloody stools  Musculoskeletal: negative for gait disturbance or muscular weakness  Neurological: no syncope or seizures; no ataxia  Dermatological: negative for pruritis,  rash and jaundice  Hematologic/lymphatic: no easy bruising, no new adenopathy      Physical Exam:    Vitals:    06/20/24 1417   BP: 125/81   Pulse: 94         Constitutional:   She is oriented to person, place, and time. Vital signs are normal. She appears well-developed and well-nourished. She appears alert. She is cooperative.  Non-toxic appearance. Normal speech.      Head:  Normocephalic and atraumatic. Head is without TMJ tenderness (bilateral, singificant pain with opening jaw; TTP intraorally at pterygoings). Salivary glands normal.  Facial  strength is normal.      Ears:  Hearing normal to normal and whispered voice; external ear normal without scars, lesions, or masses; ear canal, tympanic membrane, and middle ear normal., right ear hearing normal to normal and whispered voice; external ear normal without scars, lesions, or masses; ear canal, tympanic membrane, and middle ear normal. and left ear hearing normal to normal and whispered voice; external ear normal without scars, lesions, or masses; ear canal, tympanic membrane, and middle ear normal..   Right Ear: Tympanic membrane is not perforated, not erythematous and not retracted. No middle ear effusion.   Left Ear: Tympanic membrane is not perforated, not erythematous and not retracted.  No middle ear effusion.     Nose:  Mucosal edema, rhinorrhea and polyps (Polypoid appearance of anterior surface of right middle turbinate) present. No septal deviation or nasal septal hematoma. No epistaxis. Turbinate hypertrophy (3+, congested, boggy).  No turbinate masses.  Right sinus exhibits no maxillary sinus tenderness and no frontal sinus tenderness. Left sinus exhibits no maxillary sinus tenderness and no frontal sinus tenderness.     Mouth/Throat  Oropharynx clear and moist without lesions or asymmetry, normal uvula midline, lips, teeth, and gums normal and oropharynx normal. Normal dentition. No uvula swelling or oral lesions. No oropharyngeal exudate, posterior oropharyngeal edema or posterior oropharyngeal erythema. Mirror exam not performed due to patient tolerance.  Mirror exam not performed due to patient tolerance.          Neck:  Neck normal without thyromegaly masses, asymmetry, normal tracheal structure, crepitus, and tenderness, thyroid normal, trachea normal, full range of motion with neck supple and no adenopathy. No JVD present. Carotid bruit is not present. Thyroid tenderness is present. No edema and no erythema present. No thyroid mass and no thyromegaly present.     She has no cervical  adenopathy.     Cardiovascular:    Normal rate, regular rhythm, normal heart sounds and rate and rhythm, heart sounds, and pulses normal.              Pulmonary/Chest:   Effort and breath sounds normal.     Psychiatric:   She has a normal mood and affect. Her speech is normal and behavior is normal.     Neurological:   She is alert and oriented to person, place, and time. She has neurological normal, alert and oriented. No cranial nerve deficit.     Skin:   No abrasions, lacerations, lesions, or rashes.         Nasal/sinus endoscopy    Date/Time: 6/20/2024 2:20 PM    Performed by: Anahy Ferreira MD  Authorized by: Anahy Ferreira MD    Consent Done?:  Yes (Verbal)  Anesthesia:     Local anesthetic:  Topical anesthetic    Location: bilateral.    Patient tolerance:  Patient tolerated the procedure well with no immediate complications  Nose:     Procedure Performed:  Nasal Endoscopy  External:      No external nasal deformity  Intranasal:      Mucosa polyps (polypoid appearance of anterior surface of right middle turbinate, no annamaria polyp in the middle meatus.)     Mucosa ulcers not present     No mucosa lesions present     Enlarged turbinates     Septum gross deformity (slight deviation, S shaped)  Nasopharynx:      No mucosa lesions     Adenoids not present     Posterior choanae patent     Eustachian tube patent          Assessment:    ICD-10-CM ICD-9-CM    1. Chronic rhinitis  J31.0 472.0 Nasal/sinus endoscopy      2. Nasal cavity polyp  J33.0 471.0 CT [employer] Sinuses without      Nasal/sinus endoscopy      3. Laryngopharyngeal reflux (LPR)  K21.9 478.79       4. Deviated nasal septum  J34.2 470       5. Hypertrophy of both inferior nasal turbinates  J34.3 478.0       6. Lesion of oral mucosa  K13.70 528.9           The primary encounter diagnosis was Chronic rhinitis. Diagnoses of Nasal cavity polyp, Laryngopharyngeal reflux (LPR), Deviated nasal septum, Hypertrophy of both inferior nasal turbinates, and  Lesion of oral mucosa were also pertinent to this visit.      Plan:      Continue nasal sprays and allergy medications.    Continue Prilosec at a lower dose, continue daily Pepcid.  If patient tolerates lower dose of Prilosec, may discontinue after 1 more month and maintain on Pepcid.  If symptoms recur will consider referral to GI for further evaluation.      CT scan of the sinuses ordered and will review results.  Patient will follow-up with me and we will discuss whether any further evaluation or treatment is needed for the polypoid mucosa noted in the right nasal cavity.      Anahy Ferreira MD

## 2024-06-24 ENCOUNTER — DOCUMENTATION ONLY (OUTPATIENT)
Dept: REHABILITATION | Facility: HOSPITAL | Age: 44
End: 2024-06-24
Payer: COMMERCIAL

## 2024-06-24 NOTE — PROGRESS NOTES
" Physical Therapy Discharge summary    Pt was evaluated on 24 and was seen 2 times for PT. Pt has not attended PT since 24 and cancelled remaining visits reason "condition improved". Patient given HEP. Plan of care and/or authorization . Pt to be discharged at this time.    Ariadne Russell, PT, DPT      "

## 2024-07-08 ENCOUNTER — PROCEDURE VISIT (OUTPATIENT)
Dept: OTOLARYNGOLOGY | Facility: CLINIC | Age: 44
End: 2024-07-08
Payer: COMMERCIAL

## 2024-07-08 VITALS
HEART RATE: 46 BPM | DIASTOLIC BLOOD PRESSURE: 86 MMHG | SYSTOLIC BLOOD PRESSURE: 150 MMHG | BODY MASS INDEX: 45.07 KG/M2 | WEIGHT: 270.81 LBS

## 2024-07-08 DIAGNOSIS — K13.70 ORAL MUCOSAL LESION: Primary | Chronic | ICD-10-CM

## 2024-07-08 DIAGNOSIS — J31.0 CHRONIC RHINITIS: Chronic | ICD-10-CM

## 2024-07-08 DIAGNOSIS — J33.0 NASAL CAVITY POLYP: ICD-10-CM

## 2024-07-08 PROCEDURE — 88305 TISSUE EXAM BY PATHOLOGIST: CPT | Performed by: PATHOLOGY

## 2024-07-08 RX ORDER — HYDROCODONE BITARTRATE AND ACETAMINOPHEN 5; 325 MG/1; MG/1
1 TABLET ORAL EVERY 8 HOURS PRN
Qty: 5 TABLET | Refills: 0 | Status: SHIPPED | OUTPATIENT
Start: 2024-07-08

## 2024-07-08 NOTE — PROCEDURES
Preprocedure diagnosis:    Right lower lip/oral commissure mucosal lesion  Postprocedure diagnosis:   Same    Procedure:  Oral lesion biopsy /excision    Complications:  None  Specimen:   right lip/oral commissure mucosal lesion  Blood loss:   Minimal  Anesthesia:  Local, lidocaine    Procedure in detail:  After appropriate consents were obtained, the right lower lip near the oral commissure was anesthetized using 0.7  cc of 1% lidocaine with epinephrine.  A #15 blade was used to make an  elliptical incision over the  mucosal lesion noted on the right lower lip.   The lesion was excised in its entirety and sent for permanent pathology.      Silver nitrate was used for local hemostasis.  Two simple interrupted 3-0 chromic sutures were placed in the area of excision for simple closure.    The patient tolerated the procedure without difficulty, and there were no complications.    Anahy Ferreira MD  [facility]    This note was created by combination of typed  and MModal dictation.  Transcription errors may be present.  If there are any questions, please contact me.

## 2024-07-08 NOTE — PROGRESS NOTES
Chief Complaint   Patient presents with    Follow-up     Biopsy no new issues         HPI: Basilia Delgado is a 43 y.o. female who has been referred by Damaris Navarrete NP for a few months history of throat pain on the left side.  She notes the pain is intermittent, and it only occurs with lying on the left side and with neck movements, as well as occasionally with swallowing. Her voice is not progressively worsening over this time. There are not pitch breaks or cracks. There is not vocal fatigue. She denies otalgia.  She has occasional globus and dysphagia, but not chronic.  There is no hemoptysis or hematemesis. She is breathing well.     She denies throat clearing and cough. She has heartburn and reflux.    She does have bruxism and jaw pain that has worsened over the last few months due to stress.      She also has chronic congestion and PND.  She does not use any nasal sprays or allergy medications daily.    She reports snoring nightly, but she is unaware of apneas or restless sleep.            Interval HPI 6/20/2024:       Patient reports that the neck and throat pain she was previously experiencing has improved.  She has had significant reduction in her throat symptoms with use of LPR medications and has continued to take those.  She is continued to use nasal sprays for her rhinitis symptoms and does feel that these help.    She does feel some mild symptoms of decreased air flow in the right nasal cavity, but not significantly worsening  since last visit.    Her TMJ tenderness has not been an issue significantly since last visit.    Interval HPI 7/8/24:    Patient here today for excision of right-sided oral cavity lesion.          Past Medical History:   Diagnosis Date    Asthma      Social History     Socioeconomic History    Marital status:    Occupational History    Occupation: works for councilman     Employer: [employer]   Tobacco Use    Smoking status: Never   Substance and Sexual  Activity    Alcohol use: Never    Drug use: Never    Sexual activity: Yes     Partners: Male     Birth control/protection: Partner-Vasectomy     Past Surgical History:   Procedure Laterality Date    ADENOIDECTOMY  1986    APPENDECTOMY  1995    CHOLECYSTECTOMY  2008    ENDOMETRIAL ABLATION N/A 5/3/2023    Procedure: ABLATION, ENDOMETRIUM;  Surgeon: Vicki Mosley MD;  Location: [facility];  Service: OB/GYN;  Laterality: N/A;    LAPAROSCOPY      x 5    LAPAROTOMY      x 1    TONSILLECTOMY  1986     Family History   Problem Relation Name Age of Onset    Asthma Mother Mother     Thyroid cancer Mother Mother     Cancer Mother Mother     Alcohol abuse Father Father     Drug abuse Brother Brother     Lung cancer Maternal Grandmother      Prostate cancer Maternal Grandfather      Kidney cancer Paternal Grandmother      Alcohol abuse Paternal Grandfather Grandfather     Skin cancer Paternal Grandfather Grandfather            Review of Systems  General: negative for chills, fever or weight loss  Psychological: negative for mood changes or depression  Ophthalmic: negative for blurry vision, photophobia or eye pain  ENT: see HPI  Respiratory: no cough, shortness of breath, or wheezing  Cardiovascular: no chest pain or dyspnea on exertion  Gastrointestinal: no abdominal pain, change in bowel habits, or black/ bloody stools  Musculoskeletal: negative for gait disturbance or muscular weakness  Neurological: no syncope or seizures; no ataxia  Dermatological: negative for pruritis,  rash and jaundice  Hematologic/lymphatic: no easy bruising, no new adenopathy      Physical Exam:    Vitals:    07/08/24 1300   BP: (!) 150/86   Pulse: (!) 46           Constitutional:   She is oriented to person, place, and time. Vital signs are normal. She appears well-developed and well-nourished. She appears alert. She is cooperative.  Non-toxic appearance. Normal speech.      Head:  Normocephalic and atraumatic. Head is without TMJ tenderness  (bilateral, singificant pain with opening jaw; TTP intraorally at pterygoings). Salivary glands normal.  Facial strength is normal.      Ears:  Hearing normal to normal and whispered voice; external ear normal without scars, lesions, or masses; ear canal, tympanic membrane, and middle ear normal., right ear hearing normal to normal and whispered voice; external ear normal without scars, lesions, or masses; ear canal, tympanic membrane, and middle ear normal. and left ear hearing normal to normal and whispered voice; external ear normal without scars, lesions, or masses; ear canal, tympanic membrane, and middle ear normal..   Right Ear: Tympanic membrane is not perforated, not erythematous and not retracted. No middle ear effusion.   Left Ear: Tympanic membrane is not perforated, not erythematous and not retracted.  No middle ear effusion.     Nose:  Mucosal edema and polyps (Polypoid appearance of anterior surface of right middle turbinate) present. No rhinorrhea, septal deviation or nasal septal hematoma. No epistaxis. Turbinate hypertrophy (3+, congested, boggy).  No turbinate masses.  Right sinus exhibits no maxillary sinus tenderness and no frontal sinus tenderness. Left sinus exhibits no maxillary sinus tenderness and no frontal sinus tenderness.     Mouth/Throat  Oropharynx clear and moist without lesions or asymmetry, normal uvula midline, lips, teeth, and gums normal and oropharynx normal. Normal dentition. No uvula swelling or oral lesions. No oropharyngeal exudate, posterior oropharyngeal edema or posterior oropharyngeal erythema. Mirror exam not performed due to patient tolerance.  Mirror exam not performed due to patient tolerance.          Neck:  Thyroid normal, trachea normal, full range of motion with neck supple and no adenopathy. No JVD present. Carotid bruit is not present. Thyroid tenderness is present. No edema and no erythema present. No thyroid mass and no thyromegaly present.     She has no  cervical adenopathy.     Cardiovascular:    Normal rate, regular rhythm, normal heart sounds and rate and rhythm, heart sounds, and pulses normal.              Pulmonary/Chest:   Effort and breath sounds normal.     Psychiatric:   She has a normal mood and affect. Her speech is normal and behavior is normal.     Neurological:   She is alert and oriented to person, place, and time. She has neurological normal, alert and oriented. No cranial nerve deficit.     Skin:   No abrasions, lacerations, lesions, or rashes.           See separate note for excision of oral  mucosal lesion        Assessment:    ICD-10-CM ICD-9-CM    1. Oral mucosal lesion  K13.70 528.9 HYDROcodone-acetaminophen (NORCO) 5-325 mg per tablet      Specimen to Pathology ENT      2. Nasal cavity polyp  J33.0 471.0       3. Chronic rhinitis  J31.0 472.0             The primary encounter diagnosis was Oral mucosal lesion. Diagnoses of Nasal cavity polyp and Chronic rhinitis were also pertinent to this visit.      Plan:       Keep area clean.  Drink plenty of water.    Pain medicine prescription given to use as needed, prefer use of OTC Tylenol and Motrin p.r.n..    Follow-up after CT scan complete for workup of ongoing chronic sinus issues.    Anahy Ferreira MD

## 2024-07-08 NOTE — PATIENT INSTRUCTIONS
Keep area clean.  Drink plenty of water.   Tylenol and advil as needed.   Rx for pain meds given for use if needed.     Follow up after CT scan for other sinus workup.

## 2024-07-10 LAB
FINAL PATHOLOGIC DIAGNOSIS: NORMAL
GROSS: NORMAL
Lab: NORMAL
MICROSCOPIC EXAM: NORMAL

## 2024-07-11 ENCOUNTER — PATIENT MESSAGE (OUTPATIENT)
Dept: OTOLARYNGOLOGY | Facility: CLINIC | Age: 44
End: 2024-07-11
Payer: COMMERCIAL

## 2024-08-24 DIAGNOSIS — M51.36 DDD (DEGENERATIVE DISC DISEASE), LUMBAR: ICD-10-CM

## 2024-08-26 RX ORDER — MELOXICAM 15 MG/1
15 TABLET ORAL
Qty: 30 TABLET | Refills: 3 | Status: SHIPPED | OUTPATIENT
Start: 2024-08-26

## 2024-09-19 ENCOUNTER — LAB VISIT (OUTPATIENT)
Dept: LAB | Facility: HOSPITAL | Age: 44
End: 2024-09-19
Attending: OTOLARYNGOLOGY
Payer: COMMERCIAL

## 2024-09-19 ENCOUNTER — OFFICE VISIT (OUTPATIENT)
Dept: OTOLARYNGOLOGY | Facility: CLINIC | Age: 44
End: 2024-09-19
Payer: COMMERCIAL

## 2024-09-19 ENCOUNTER — TELEPHONE (OUTPATIENT)
Dept: OTOLARYNGOLOGY | Facility: CLINIC | Age: 44
End: 2024-09-19
Payer: COMMERCIAL

## 2024-09-19 ENCOUNTER — PATIENT MESSAGE (OUTPATIENT)
Dept: OTOLARYNGOLOGY | Facility: CLINIC | Age: 44
End: 2024-09-19
Payer: COMMERCIAL

## 2024-09-19 VITALS — SYSTOLIC BLOOD PRESSURE: 134 MMHG | DIASTOLIC BLOOD PRESSURE: 86 MMHG | HEART RATE: 96 BPM

## 2024-09-19 DIAGNOSIS — G90.01 CAROTIDYNIA: Primary | Chronic | ICD-10-CM

## 2024-09-19 DIAGNOSIS — M54.2 NECK PAIN: Chronic | ICD-10-CM

## 2024-09-19 DIAGNOSIS — G90.01 CAROTIDYNIA: ICD-10-CM

## 2024-09-19 DIAGNOSIS — F41.8 SITUATIONAL ANXIETY: ICD-10-CM

## 2024-09-19 LAB
ANION GAP SERPL CALC-SCNC: 11 MMOL/L (ref 8–16)
BASOPHILS # BLD AUTO: 0.05 K/UL (ref 0–0.2)
BASOPHILS NFR BLD: 0.7 % (ref 0–1.9)
BUN SERPL-MCNC: 10 MG/DL (ref 6–20)
CALCIUM SERPL-MCNC: 9.4 MG/DL (ref 8.7–10.5)
CHLORIDE SERPL-SCNC: 103 MMOL/L (ref 95–110)
CO2 SERPL-SCNC: 27 MMOL/L (ref 23–29)
CREAT SERPL-MCNC: 0.8 MG/DL (ref 0.5–1.4)
DIFFERENTIAL METHOD BLD: NORMAL
EOSINOPHIL # BLD AUTO: 0.2 K/UL (ref 0–0.5)
EOSINOPHIL NFR BLD: 2.2 % (ref 0–8)
ERYTHROCYTE [DISTWIDTH] IN BLOOD BY AUTOMATED COUNT: 13.1 % (ref 11.5–14.5)
ERYTHROCYTE [SEDIMENTATION RATE] IN BLOOD BY PHOTOMETRIC METHOD: 8 MM/HR (ref 0–36)
EST. GFR  (NO RACE VARIABLE): >60 ML/MIN/1.73 M^2
GLUCOSE SERPL-MCNC: 95 MG/DL (ref 70–110)
HCT VFR BLD AUTO: 43.8 % (ref 37–48.5)
HGB BLD-MCNC: 14.2 G/DL (ref 12–16)
IMM GRANULOCYTES # BLD AUTO: 0.02 K/UL (ref 0–0.04)
IMM GRANULOCYTES NFR BLD AUTO: 0.3 % (ref 0–0.5)
LYMPHOCYTES # BLD AUTO: 2.9 K/UL (ref 1–4.8)
LYMPHOCYTES NFR BLD: 41.8 % (ref 18–48)
MCH RBC QN AUTO: 30 PG (ref 27–31)
MCHC RBC AUTO-ENTMCNC: 32.4 G/DL (ref 32–36)
MCV RBC AUTO: 92 FL (ref 82–98)
MONOCYTES # BLD AUTO: 0.5 K/UL (ref 0.3–1)
MONOCYTES NFR BLD: 7 % (ref 4–15)
NEUTROPHILS # BLD AUTO: 3.3 K/UL (ref 1.8–7.7)
NEUTROPHILS NFR BLD: 48 % (ref 38–73)
NRBC BLD-RTO: 0 /100 WBC
PLATELET # BLD AUTO: 231 K/UL (ref 150–450)
PMV BLD AUTO: 11.6 FL (ref 9.2–12.9)
POTASSIUM SERPL-SCNC: 3.8 MMOL/L (ref 3.5–5.1)
RBC # BLD AUTO: 4.74 M/UL (ref 4–5.4)
RHEUMATOID FACT SERPL-ACNC: <13 IU/ML (ref 0–15)
SODIUM SERPL-SCNC: 141 MMOL/L (ref 136–145)
WBC # BLD AUTO: 6.82 K/UL (ref 3.9–12.7)

## 2024-09-19 PROCEDURE — 80048 BASIC METABOLIC PNL TOTAL CA: CPT | Performed by: OTOLARYNGOLOGY

## 2024-09-19 PROCEDURE — 1159F MED LIST DOCD IN RCRD: CPT | Mod: CPTII,S$GLB,, | Performed by: OTOLARYNGOLOGY

## 2024-09-19 PROCEDURE — 86038 ANTINUCLEAR ANTIBODIES: CPT | Performed by: OTOLARYNGOLOGY

## 2024-09-19 PROCEDURE — 86235 NUCLEAR ANTIGEN ANTIBODY: CPT | Mod: 59 | Performed by: OTOLARYNGOLOGY

## 2024-09-19 PROCEDURE — 99214 OFFICE O/P EST MOD 30 MIN: CPT | Mod: S$GLB,,, | Performed by: OTOLARYNGOLOGY

## 2024-09-19 PROCEDURE — 1160F RVW MEDS BY RX/DR IN RCRD: CPT | Mod: CPTII,S$GLB,, | Performed by: OTOLARYNGOLOGY

## 2024-09-19 PROCEDURE — 86036 ANCA SCREEN EACH ANTIBODY: CPT | Mod: 59 | Performed by: OTOLARYNGOLOGY

## 2024-09-19 PROCEDURE — 86039 ANTINUCLEAR ANTIBODIES (ANA): CPT | Performed by: OTOLARYNGOLOGY

## 2024-09-19 PROCEDURE — 3079F DIAST BP 80-89 MM HG: CPT | Mod: CPTII,S$GLB,, | Performed by: OTOLARYNGOLOGY

## 2024-09-19 PROCEDURE — 3075F SYST BP GE 130 - 139MM HG: CPT | Mod: CPTII,S$GLB,, | Performed by: OTOLARYNGOLOGY

## 2024-09-19 PROCEDURE — 36415 COLL VENOUS BLD VENIPUNCTURE: CPT | Performed by: OTOLARYNGOLOGY

## 2024-09-19 PROCEDURE — 85652 RBC SED RATE AUTOMATED: CPT | Performed by: OTOLARYNGOLOGY

## 2024-09-19 PROCEDURE — 86431 RHEUMATOID FACTOR QUANT: CPT | Performed by: OTOLARYNGOLOGY

## 2024-09-19 PROCEDURE — 86235 NUCLEAR ANTIGEN ANTIBODY: CPT | Performed by: OTOLARYNGOLOGY

## 2024-09-19 PROCEDURE — 85025 COMPLETE CBC W/AUTO DIFF WBC: CPT | Performed by: OTOLARYNGOLOGY

## 2024-09-19 PROCEDURE — 99999 PR PBB SHADOW E&M-EST. PATIENT-LVL III: CPT | Mod: PBBFAC,,, | Performed by: OTOLARYNGOLOGY

## 2024-09-19 RX ORDER — ALPRAZOLAM 0.25 MG/1
0.25 TABLET ORAL 2 TIMES DAILY PRN
Qty: 10 TABLET | Refills: 0 | Status: SHIPPED | OUTPATIENT
Start: 2024-09-19 | End: 2024-10-19

## 2024-09-19 RX ORDER — PREDNISONE 10 MG/1
10 TABLET ORAL DAILY
Qty: 10 TABLET | Refills: 0 | Status: SHIPPED | OUTPATIENT
Start: 2024-09-19

## 2024-09-19 NOTE — TELEPHONE ENCOUNTER
Spoke with patient and informed her that we have nothing sooner but will place her on waitlist incase a sooner appt is available   ----- Message from Heriberto Sanchez sent at 9/19/2024 10:29 AM CDT -----  Contact: Pt  .Type:  Sooner Apoointment Request    Caller is requesting a sooner appointment.  Caller declined first available appointment listed below.  Caller will not accept being placed on the waitlist and is requesting a message be sent to doctor.  Name of Caller:pt  When is the first available appointment?10/31/2024  Symptoms: 2-3 month f/u to review CT  Would the patient rather a call back or a response via [facility]?  Call back  Best Call Back Number:[phone]  Additional Information:Pt. States she is having the issue again and prefer to be seen while it is going on.

## 2024-09-19 NOTE — PATIENT INSTRUCTIONS
CT of the neck with and without contrast.    Will order basic inflammatory workup to rule out autoimmune inflammatory issues  Causing carotidynia.    Will follow-up on results and discuss with patient any further measures for treatment.    Patient may use OTC NSAIDs or Tylenol, heating pad.  I will give prescription for prednisone taper that she may start once I have all of her labs back.    Gave prescription for Xanax to be used with CT scan for anxiety.

## 2024-09-19 NOTE — PROGRESS NOTES
Chief Complaint   Patient presents with    Follow-up     Pain between ear and throat, left side, started last night        HPI: Basilia Delgado is a 43 y.o. female who has been referred by Damaris Navarrete NP for a few months history of throat pain on the left side.  She notes the pain is intermittent, and it only occurs with lying on the left side and with neck movements, as well as occasionally with swallowing. Her voice is not progressively worsening over this time. There are not pitch breaks or cracks. There is not vocal fatigue. She denies otalgia.  She has occasional globus and dysphagia, but not chronic.  There is no hemoptysis or hematemesis. She is breathing well.     She denies throat clearing and cough. She has heartburn and reflux.    She does have bruxism and jaw pain that has worsened over the last few months due to stress.      She also has chronic congestion and PND.  She does not use any nasal sprays or allergy medications daily.    She reports snoring nightly, but she is unaware of apneas or restless sleep.            Interval HPI 6/20/2024:       Patient reports that the neck and throat pain she was previously experiencing has improved.  She has had significant reduction in her throat symptoms with use of LPR medications and has continued to take those.  She is continued to use nasal sprays for her rhinitis symptoms and does feel that these help.    She does feel some mild symptoms of decreased air flow in the right nasal cavity, but not significantly worsening  since last visit.    Her TMJ tenderness has not been an issue significantly since last visit.    Interval HPI 09/19/2024 :      Patient presents today with acute exacerbation of her previously mentioned neck pain.  Started a few days ago.  She reports pain all along the SCM muscle and in the midportion of the neck.  She does not report any other new head and neck related symptoms.  She has not been sick lately.  She has not started any  new medications.  She has not done any new exercising.  No reason for muscle strain in that area that she can think of.  Patient does not report any history of bruxism or recent dental issues.          Past Medical History:   Diagnosis Date    Asthma      Social History     Socioeconomic History    Marital status:    Occupational History    Occupation: works for councilman     Employer: [employer]   Tobacco Use    Smoking status: Never   Substance and Sexual Activity    Alcohol use: Never    Drug use: Never    Sexual activity: Yes     Partners: Male     Birth control/protection: Partner-Vasectomy     Past Surgical History:   Procedure Laterality Date    ADENOIDECTOMY  1986    APPENDECTOMY  1995    CHOLECYSTECTOMY  2008    ENDOMETRIAL ABLATION N/A 5/3/2023    Procedure: ABLATION, ENDOMETRIUM;  Surgeon: Vicki Mosley MD;  Location: [facility];  Service: OB/GYN;  Laterality: N/A;    LAPAROSCOPY      x 5    LAPAROTOMY      x 1    TONSILLECTOMY  1986     Family History   Problem Relation Name Age of Onset    Asthma Mother Mother     Thyroid cancer Mother Mother     Cancer Mother Mother     Alcohol abuse Father Father     Drug abuse Brother Brother     Lung cancer Maternal Grandmother      Prostate cancer Maternal Grandfather      Kidney cancer Paternal Grandmother      Alcohol abuse Paternal Grandfather Grandfather     Skin cancer Paternal Grandfather Grandfather            Review of Systems  General: negative for chills, fever or weight loss  Psychological: negative for mood changes or depression  Ophthalmic: negative for blurry vision, photophobia or eye pain  ENT: see HPI  Respiratory: no cough, shortness of breath, or wheezing  Cardiovascular: no chest pain or dyspnea on exertion  Gastrointestinal: no abdominal pain, change in bowel habits, or black/ bloody stools  Musculoskeletal: negative for gait disturbance or muscular weakness  Neurological: no syncope or seizures; no ataxia  Dermatological:  negative for pruritis,  rash and jaundice  Hematologic/lymphatic: no easy bruising, no new adenopathy      Physical Exam:    Vitals:    09/19/24 1516   BP: 134/86   Pulse: 96           Constitutional:   She is oriented to person, place, and time. Vital signs are normal. She appears well-developed and well-nourished. She appears alert. She is cooperative.  Non-toxic appearance. Normal speech.      Head:  Normocephalic and atraumatic. Head is with TMJ tenderness (Tenderness along left TM joint, jaw, SCM muscle.  Tenderness near carotid bifurcation and a long carotid sheath.). Salivary glands normal.  Facial strength is normal.      Ears:  Hearing normal to normal and whispered voice; external ear normal without scars, lesions, or masses; ear canal, tympanic membrane, and middle ear normal., right ear hearing normal to normal and whispered voice; external ear normal without scars, lesions, or masses; ear canal, tympanic membrane, and middle ear normal. and left ear hearing normal to normal and whispered voice; external ear normal without scars, lesions, or masses; ear canal, tympanic membrane, and middle ear normal..   Right Ear: Tympanic membrane is not perforated, not erythematous and not retracted. No middle ear effusion.   Left Ear: Tympanic membrane is not perforated, not erythematous and not retracted.  No middle ear effusion.     Nose:  Mucosal edema and polyps (Polypoid appearance of anterior surface of right middle turbinate) present. No rhinorrhea, septal deviation or nasal septal hematoma. No epistaxis. Turbinate hypertrophy (3+, congested, boggy).  No turbinate masses.  Right sinus exhibits no maxillary sinus tenderness and no frontal sinus tenderness. Left sinus exhibits no maxillary sinus tenderness and no frontal sinus tenderness.     Mouth/Throat  Oropharynx clear and moist without lesions or asymmetry, normal uvula midline, lips, teeth, and gums normal and oropharynx normal. Normal dentition. No uvula  swelling or oral lesions. No oropharyngeal exudate, posterior oropharyngeal edema or posterior oropharyngeal erythema. Mirror exam not performed due to patient tolerance.  Mirror exam not performed due to patient tolerance.    Area of previous mucous membrane lesion excision well healed.    Mild tenderness to palpation along left pterygoid/retromolar trigone area.      Neck:  Neck normal without thyromegaly masses, asymmetry, normal tracheal structure, crepitus, and tenderness, thyroid normal, trachea normal, full range of motion with neck supple and no adenopathy. No JVD present. Carotid bruit is not present. Thyroid tenderness is present. No edema and no erythema present. No thyroid mass and no thyromegaly present.     She has no cervical adenopathy.       Tenderness long left SCM, point tenderness near carotid bifurcation.     Cardiovascular:    Normal rate, regular rhythm, normal heart sounds and rate and rhythm, heart sounds, and pulses normal.              Pulmonary/Chest:   Effort and breath sounds normal.     Psychiatric:   She has a normal mood and affect. Her speech is normal and behavior is normal.     Neurological:   She is alert and oriented to person, place, and time. She has neurological normal, alert and oriented. No cranial nerve deficit.     Skin:   No abrasions, lacerations, lesions, or rashes.                 Assessment:    ICD-10-CM ICD-9-CM    1. Carotidynia  G90.01 337.01 CT Soft Tissue Neck W WO Contrast      Sjogrens syndrome-A extractable nuclear antibody      Sjogrens syndrome-B extractable nuclear antibody      Rheumatoid Factor      LUÍS Screen w/Reflex      Sedimentation rate      CBC Auto Differential      ANTI-NEUTROPHILIC CYTOPLASMIC ANTIBODY      Basic Metabolic Panel      2. Situational anxiety  F41.8 300.09 ALPRAZolam (XANAX) 0.25 MG tablet      3. Neck pain  M54.2 723.1             The primary encounter diagnosis was Carotidynia. Diagnoses of Situational anxiety and Neck pain were  also pertinent to this visit.      Plan:      Will order CT scan of the neck with IV contrast to assess cervical spine, neck musculature, jaw.  Gave patient prescription for Xanax due to anxiety with testing.    Will do basic workup for autoimmune or inflammatory vasculitides.    Patient may use heating pad, anti-inflammatory medications, Tylenol for pain.  Gave prescription for short taper of prednisone, but patient will not start this medication until after she has workup with labs and neck CT complete.    Anahy Ferreira MD

## 2024-09-20 ENCOUNTER — HOSPITAL ENCOUNTER (OUTPATIENT)
Dept: RADIOLOGY | Facility: HOSPITAL | Age: 44
Discharge: HOME | End: 2024-09-20
Attending: OTOLARYNGOLOGY
Payer: COMMERCIAL

## 2024-09-20 DIAGNOSIS — G90.01 CAROTIDYNIA: Chronic | ICD-10-CM

## 2024-09-20 LAB
ANA PATTERN 1: NORMAL
ANA SER QL IF: POSITIVE
ANA TITR SER IF: NORMAL {TITER}

## 2024-09-20 PROCEDURE — 70492 CT SFT TSUE NCK W/O & W/DYE: CPT | Mod: TC

## 2024-09-20 PROCEDURE — 70492 CT SFT TSUE NCK W/O & W/DYE: CPT | Mod: 26,,, | Performed by: RADIOLOGY

## 2024-09-20 PROCEDURE — 25500020 PHARM REV CODE 255: Performed by: OTOLARYNGOLOGY

## 2024-09-20 RX ADMIN — IOHEXOL 75 ML: 350 INJECTION, SOLUTION INTRAVENOUS at 03:09

## 2024-09-21 NOTE — PROGRESS NOTES
Labs reviewed and CBC, sedimentation rate, metabolic panel, rheumatoid factor negative.  LUÍS profile positive, but further confirmatory testing pending..    I will follow-up on LUÍS results and other labs still pending and discuss with patient.    Anahy Ferreira MD  [facility]

## 2024-09-22 NOTE — PROGRESS NOTES
CT scan films reviewed and does show soft tissue infiltration and inflammation of the fascial planes around the left carotid artery, most prominent at the bifurcation, consistent with the patient's symptoms.  Labs still pending for ruling out vasculitis, but carotidynia/TIPIC syndrome likely, given the clinical scenario.    I will call to discuss with patient next steps, but she has already had recommendations for treatment that she should continue to follow.      Anahy Ferreira MD  [facility]

## 2024-09-23 ENCOUNTER — TELEPHONE (OUTPATIENT)
Dept: OTOLARYNGOLOGY | Facility: CLINIC | Age: 44
End: 2024-09-23
Payer: COMMERCIAL

## 2024-09-23 DIAGNOSIS — F41.9 ANXIETY: ICD-10-CM

## 2024-09-23 LAB
ANCA AB TITR SER IF: NORMAL TITER
P-ANCA TITR SER IF: NORMAL TITER

## 2024-09-23 RX ORDER — ESCITALOPRAM OXALATE 10 MG/1
10 TABLET ORAL
Qty: 90 TABLET | Refills: 0 | Status: SHIPPED | OUTPATIENT
Start: 2024-09-23

## 2024-09-23 NOTE — TELEPHONE ENCOUNTER
----- Message from Anahy Ferreira MD sent at 9/23/2024  2:49 PM CDT -----  Labs reviewed and C-ANCA negative for any vasculitic pathology.  Still awaiting further LUÍS results.      Anahy Ferreira MD  [facility]

## 2024-09-23 NOTE — PROGRESS NOTES
Labs reviewed and C-ANCA negative for any vasculitic pathology.  Still awaiting further LUÍS results.      Anahy Ferreira MD  [facility]

## 2024-09-23 NOTE — TELEPHONE ENCOUNTER
Refill Decision Note   Basilia Delgado  is requesting a refill authorization.  Brief Assessment and Rationale for Refill:  Approve     Medication Therapy Plan:         Comments:     Note composed:4:45 PM 09/23/2024

## 2024-09-23 NOTE — TELEPHONE ENCOUNTER
No care due was identified.  [facility] Embedded Care Due Messages. Reference number: [id number].   9/23/2024 9:31:57 AM CDT

## 2024-09-24 ENCOUNTER — PATIENT MESSAGE (OUTPATIENT)
Dept: OTOLARYNGOLOGY | Facility: CLINIC | Age: 44
End: 2024-09-24
Payer: COMMERCIAL

## 2024-09-24 LAB
ANTI-SSA ANTIBODY: 0.05 RATIO (ref 0–0.99)
ANTI-SSA INTERPRETATION: NEGATIVE
ANTI-SSB ANTIBODY: 0.06 RATIO (ref 0–0.99)
ANTI-SSB INTERPRETATION: NEGATIVE

## 2024-09-24 NOTE — PROGRESS NOTES
Labs reviewed and remainder of LUÍS workup all negative for Sjogren's or lupus.  I did mentioned of the patient that I would likely place rheumatologic referral for her to be established just to ensure there is no further workup they would recommend for this issue.  I will place referral.    Anahy Ferreira MD  [facility]

## 2024-09-25 LAB
ANTI SM ANTIBODY: 0.07 RATIO (ref 0–0.99)
ANTI SM/RNP ANTIBODY: 0.08 RATIO (ref 0–0.99)
ANTI-SM INTERPRETATION: NEGATIVE
ANTI-SM/RNP INTERPRETATION: NEGATIVE
ANTI-SSA ANTIBODY: 0.05 RATIO (ref 0–0.99)
ANTI-SSA INTERPRETATION: NEGATIVE
ANTI-SSB ANTIBODY: 0.06 RATIO (ref 0–0.99)
ANTI-SSB INTERPRETATION: NEGATIVE
DSDNA AB SER-ACNC: NORMAL [IU]/ML

## 2024-10-03 ENCOUNTER — LAB VISIT (OUTPATIENT)
Dept: LAB | Facility: HOSPITAL | Age: 44
End: 2024-10-03
Attending: INTERNAL MEDICINE
Payer: COMMERCIAL

## 2024-10-03 ENCOUNTER — OFFICE VISIT (OUTPATIENT)
Dept: RHEUMATOLOGY | Facility: CLINIC | Age: 44
End: 2024-10-03
Payer: COMMERCIAL

## 2024-10-03 VITALS
WEIGHT: 272.5 LBS | BODY MASS INDEX: 45.4 KG/M2 | DIASTOLIC BLOOD PRESSURE: 89 MMHG | HEART RATE: 88 BPM | SYSTOLIC BLOOD PRESSURE: 134 MMHG | HEIGHT: 65 IN

## 2024-10-03 DIAGNOSIS — G90.01 CAROTIDYNIA: Primary | ICD-10-CM

## 2024-10-03 DIAGNOSIS — M25.50 POLYARTHRALGIA: ICD-10-CM

## 2024-10-03 LAB
C3 SERPL-MCNC: 165 MG/DL (ref 50–180)
C4 SERPL-MCNC: 28 MG/DL (ref 11–44)
CRP SERPL-MCNC: 4.8 MG/L (ref 0–8.2)

## 2024-10-03 PROCEDURE — 3008F BODY MASS INDEX DOCD: CPT | Mod: CPTII,S$GLB,, | Performed by: INTERNAL MEDICINE

## 2024-10-03 PROCEDURE — 1159F MED LIST DOCD IN RCRD: CPT | Mod: CPTII,S$GLB,, | Performed by: INTERNAL MEDICINE

## 2024-10-03 PROCEDURE — 36415 COLL VENOUS BLD VENIPUNCTURE: CPT | Performed by: INTERNAL MEDICINE

## 2024-10-03 PROCEDURE — 83516 IMMUNOASSAY NONANTIBODY: CPT | Performed by: INTERNAL MEDICINE

## 2024-10-03 PROCEDURE — 82787 IGG 1 2 3 OR 4 EACH: CPT | Mod: 59 | Performed by: INTERNAL MEDICINE

## 2024-10-03 PROCEDURE — 86140 C-REACTIVE PROTEIN: CPT | Performed by: INTERNAL MEDICINE

## 2024-10-03 PROCEDURE — 83516 IMMUNOASSAY NONANTIBODY: CPT | Mod: 59 | Performed by: INTERNAL MEDICINE

## 2024-10-03 PROCEDURE — 82164 ANGIOTENSIN I ENZYME TEST: CPT | Performed by: INTERNAL MEDICINE

## 2024-10-03 PROCEDURE — 86160 COMPLEMENT ANTIGEN: CPT | Mod: 59 | Performed by: INTERNAL MEDICINE

## 2024-10-03 PROCEDURE — 3079F DIAST BP 80-89 MM HG: CPT | Mod: CPTII,S$GLB,, | Performed by: INTERNAL MEDICINE

## 2024-10-03 PROCEDURE — 3075F SYST BP GE 130 - 139MM HG: CPT | Mod: CPTII,S$GLB,, | Performed by: INTERNAL MEDICINE

## 2024-10-03 PROCEDURE — 99204 OFFICE O/P NEW MOD 45 MIN: CPT | Mod: S$GLB,,, | Performed by: INTERNAL MEDICINE

## 2024-10-03 PROCEDURE — 99999 PR PBB SHADOW E&M-EST. PATIENT-LVL III: CPT | Mod: PBBFAC,,, | Performed by: INTERNAL MEDICINE

## 2024-10-03 PROCEDURE — 86160 COMPLEMENT ANTIGEN: CPT | Performed by: INTERNAL MEDICINE

## 2024-10-03 RX ORDER — FLUTICASONE PROPIONATE 50 MCG
1 SPRAY, SUSPENSION (ML) NASAL 2 TIMES DAILY
COMMUNITY
Start: 2024-05-23

## 2024-10-03 NOTE — PROGRESS NOTES
"Subjective:      Patient ID: Basilia Delgado is a 44 y.o. female.    Chief Complaint: Disease Management    HPI  44 year old with PMH of asthma, endometriosis here for evaluation for left carotid artery pain.   In beginning of year, she started to have pain around her left carotid artery.   She has 3 episodes lasting 3 to 5 days.    Denies triggers for episodes.    Denies dry eyes or dry mouth. Denies fevers or night sweats. Denies hair loss, oral ulcers, Raynauds, rashes, or photosensitivity.  Her last episode was 2 weeks ago.  Denies cough or SOB. n    Past Medical History:   Diagnosis Date    Asthma      Review of Systems see HPI      Objective:   /89   Pulse 88   Ht 5' 5" (1.651 m)   Wt 123.6 kg (272 lb 7.8 oz)   BMI 45.34 kg/m²   Physical Exam   Constitutional: She is oriented to person, place, and time.   HENT:   Head: Normocephalic and atraumatic.   Right Ear: External ear normal.   Left Ear: External ear normal.   Nose: Nose normal.   Mouth/Throat: Oropharynx is clear and moist. No oropharyngeal exudate.   Eyes: Pupils are equal, round, and reactive to light. Conjunctivae are normal. Right eye exhibits no discharge. Left eye exhibits no discharge. No scleral icterus.   Neck: No JVD present. No thyromegaly present.   Cardiovascular: Normal rate, regular rhythm and normal heart sounds. Exam reveals no gallop and no friction rub.   No murmur heard.  Pulmonary/Chest: Effort normal and breath sounds normal. No respiratory distress. She has no wheezes. She has no rales. She exhibits no tenderness.   Abdominal: Soft. Bowel sounds are normal. She exhibits no distension and no mass. There is no abdominal tenderness. There is no rebound and no guarding.   Musculoskeletal:         General: No swelling, tenderness or signs of injury.      Cervical back: Neck supple.   Lymphadenopathy:     She has no cervical adenopathy.   Neurological: She is alert and oriented to person, place, and time. No cranial nerve " "deficit. Gait normal. Coordination normal.   Skin: Skin is dry. No rash noted. No erythema. No pallor.   Psychiatric: Affect and judgment normal.      CT neck ( I personally reviewed):9/2024: I personally reviewed;  Impression:     Infiltration of the soft tissue and fascial planes around the left carotid artery most prominent the carotid bifurcation.  No evidence of significant luminal irregularity.  No although an underlying vasculitis is not excluded, carotidynia/TIPIC syndrome to be considered particularly in light of the history.  Follow-up as clinically warranted.         No data to display     Assessment:   44 year old F here for evaluation of abnormal CT of neck.  She was having left carotid pain and was found to have "Infiltration of the soft tissue and fascial planes around the left carotid artery most prominent the carotid bifurcation. "  I will work her up for vasculitis but have low suspicion. Per the radiology interpretation, this is more consistent with TIPIC syndrome.  Discussed with patient signs and symptoms of vasculitis.  No diagnosis found.      Plan:     Problem List Items Addressed This Visit    None  #Left carotid pain:  Labs  Rtc prn    #Obesity: risks of obesity discussed including increased inflammation.  -discussed diet including anti-inflammatory diet and handout was given  -encouraged daily exercise 30 minutes a day       45* minutes of total time spent on the encounter, which includes face to face time and non-face to face time preparing to see the patient (eg, review of tests), Obtaining and/or reviewing separately obtained history, Documenting clinical information in the electronic or other health record, Independently interpreting results (not separately reported) and communicating results to the patient/family/caregiver, or Care coordination (not separately reported).         "

## 2024-10-03 NOTE — PROGRESS NOTES
9/27/2024     1:02 PM   Rapid3 Question Responses and Scores   MDHAQ Score 0.1   Psychologic Score 2.2   Pain Score 5   When you awakened in the morning OVER THE LAST WEEK, did you feel stiff? No   Fatigue Score 0   Global Health Score 1   RAPID3 Score 2.11     Answers submitted by the patient for this visit:  Rheumatology Questionnaire (Submitted on 9/27/2024)  fever: No  eye redness: No  mouth sores: No  headaches: No  shortness of breath: No  chest pain: No  trouble swallowing: No  diarrhea: Yes  constipation: No  unexpected weight change: No  genital sore: No  dysuria: No  During the last 3 days, have you had a skin rash?: No  Bruises or bleeds easily: No  cough: No

## 2024-10-05 LAB
ACE SERPL-CCNC: 41 U/L (ref 16–85)
PROTEINASE3 IGG SER-ACNC: <0.2 U

## 2024-10-07 LAB
IGG1 SER-MCNC: 480 MG/DL (ref 382–929)
IGG2 SER-MCNC: 190 MG/DL (ref 242–700)
IGG3 SER-MCNC: 68 MG/DL (ref 22–176)
IGG4 SER-MCNC: 10 MG/DL (ref 4–86)
MYELOPEROXIDASE AB SER-ACNC: 0.2 U/ML

## 2024-10-31 ENCOUNTER — OFFICE VISIT (OUTPATIENT)
Dept: OTOLARYNGOLOGY | Facility: CLINIC | Age: 44
End: 2024-10-31
Payer: COMMERCIAL

## 2024-10-31 VITALS
DIASTOLIC BLOOD PRESSURE: 81 MMHG | SYSTOLIC BLOOD PRESSURE: 132 MMHG | BODY MASS INDEX: 45.2 KG/M2 | WEIGHT: 271.63 LBS | HEART RATE: 90 BPM

## 2024-10-31 DIAGNOSIS — K21.9 LARYNGOPHARYNGEAL REFLUX (LPR): Chronic | ICD-10-CM

## 2024-10-31 DIAGNOSIS — J34.2 DEVIATED NASAL SEPTUM: Chronic | ICD-10-CM

## 2024-10-31 DIAGNOSIS — G90.01 CAROTIDYNIA: Primary | Chronic | ICD-10-CM

## 2024-10-31 DIAGNOSIS — J31.0 CHRONIC RHINITIS: Chronic | ICD-10-CM

## 2024-10-31 DIAGNOSIS — M54.2 NECK PAIN: ICD-10-CM

## 2024-10-31 PROCEDURE — 99214 OFFICE O/P EST MOD 30 MIN: CPT | Mod: S$GLB,,, | Performed by: OTOLARYNGOLOGY

## 2024-10-31 PROCEDURE — 3008F BODY MASS INDEX DOCD: CPT | Mod: CPTII,S$GLB,, | Performed by: OTOLARYNGOLOGY

## 2024-10-31 PROCEDURE — 1160F RVW MEDS BY RX/DR IN RCRD: CPT | Mod: CPTII,S$GLB,, | Performed by: OTOLARYNGOLOGY

## 2024-10-31 PROCEDURE — 1159F MED LIST DOCD IN RCRD: CPT | Mod: CPTII,S$GLB,, | Performed by: OTOLARYNGOLOGY

## 2024-10-31 PROCEDURE — 3075F SYST BP GE 130 - 139MM HG: CPT | Mod: CPTII,S$GLB,, | Performed by: OTOLARYNGOLOGY

## 2024-10-31 PROCEDURE — 3079F DIAST BP 80-89 MM HG: CPT | Mod: CPTII,S$GLB,, | Performed by: OTOLARYNGOLOGY

## 2024-10-31 PROCEDURE — 99999 PR PBB SHADOW E&M-EST. PATIENT-LVL III: CPT | Mod: PBBFAC,,, | Performed by: OTOLARYNGOLOGY

## 2024-10-31 RX ORDER — IBUPROFEN 600 MG/1
600 TABLET ORAL EVERY 6 HOURS PRN
Qty: 15 TABLET | Refills: 1 | Status: SHIPPED | OUTPATIENT
Start: 2024-10-31

## 2024-11-08 ENCOUNTER — OFFICE VISIT (OUTPATIENT)
Dept: OBSTETRICS AND GYNECOLOGY | Facility: CLINIC | Age: 44
End: 2024-11-08
Payer: COMMERCIAL

## 2024-11-08 VITALS
BODY MASS INDEX: 45.36 KG/M2 | HEIGHT: 65 IN | WEIGHT: 272.25 LBS | SYSTOLIC BLOOD PRESSURE: 130 MMHG | DIASTOLIC BLOOD PRESSURE: 90 MMHG

## 2024-11-08 DIAGNOSIS — Z12.11 COLON CANCER SCREENING: ICD-10-CM

## 2024-11-08 DIAGNOSIS — Z12.31 BREAST CANCER SCREENING BY MAMMOGRAM: ICD-10-CM

## 2024-11-08 DIAGNOSIS — Z01.419 WELL WOMAN EXAM WITH ROUTINE GYNECOLOGICAL EXAM: Primary | ICD-10-CM

## 2024-11-08 PROCEDURE — 99999 PR PBB SHADOW E&M-EST. PATIENT-LVL IV: CPT | Mod: PBBFAC,,, | Performed by: STUDENT IN AN ORGANIZED HEALTH CARE EDUCATION/TRAINING PROGRAM

## 2024-11-08 NOTE — PROGRESS NOTES
History & Physical  Gynecology      SUBJECTIVE:     Chief Complaint: Well Woman         History of Present Illness:  Annual exam. S/p ablation 2023. No bleeding  Obstetric Hx: ,  (21 and 15; 2 girls)  LMP: n/a  STD/STI Hx: Denies any history of STD's  Contraception Hx: vasectomy   Sexually Active: one male partner  Family history: Denies any personal or family history of GYN/colon cancers. Mother thyroid   Social: Wears seatbelts. Exercises sometimes. Feels safe at home.   Last pap:  normal  Mammogram 10/2023  Covid vaccine vaccinated  Vitamins: yes        Review of patient's allergies indicates:   Allergen Reactions    Cephalosporins Anaphylaxis, Other (See Comments) and Shortness Of Breath     Other Reaction(s): respiratory distress    Penicillins Rash       Past Medical History:   Diagnosis Date    Asthma     Endometriosis of uterus     Fibroid      Past Surgical History:   Procedure Laterality Date    ADENOIDECTOMY      APPENDECTOMY      CHOLECYSTECTOMY      ENDOMETRIAL ABLATION N/A 2023    Procedure: ABLATION, ENDOMETRIUM;  Surgeon: Vicki Mosley MD;  Location: [facility];  Service: OB/GYN;  Laterality: N/A;    LAPAROSCOPY      x 5    LAPAROTOMY      x 1    PELVIC LAPAROSCOPY  , , , , ,     TONSILLECTOMY       OB History          2    Para   2    Term   2            AB        Living             SAB        IAB        Ectopic        Multiple        Live Births                   Family History   Problem Relation Name Age of Onset    Asthma Mother Mother     Thyroid cancer Mother Mother     Cancer Mother Mother     Alcohol abuse Father Father     Drug abuse Brother Brother     Lung cancer Maternal Grandmother      Prostate cancer Maternal Grandfather      Kidney cancer Paternal Grandmother      Alcohol abuse Paternal Grandfather Grandfather     Skin cancer Paternal Grandfather Grandfather      Social History     Tobacco Use     Smoking status: Never   Substance Use Topics    Alcohol use: Never    Drug use: Never       Current Outpatient Medications   Medication Sig    albuterol (PROVENTIL/VENTOLIN HFA) 90 mcg/actuation inhaler INHALE 1-2 PUFFS BY MOUTH INTO THE LUNGS EVERY 6 HOURS AS NEEDED FOR WHEEZING. (RESCUE)    cetirizine (ZYRTEC) 10 MG tablet Take 1 tablet (10 mg total) by mouth once daily.    cyclobenzaprine (FLEXERIL) 10 MG tablet Take 1 tablet (10 mg total) by mouth 3 (three) times daily as needed for Muscle spasms.    EScitalopram oxalate (LEXAPRO) 10 MG tablet TAKE 1 TABLET BY MOUTH ONCE DAILY    famotidine (PEPCID) 40 MG tablet Take 0.5 tablets (20 mg total) by mouth nightly. for 365 doses    fluticasone propionate (FLONASE) 50 mcg/actuation nasal spray 1 spray by Each Nostril route 2 (two) times daily.    gabapentin (NEURONTIN) 300 MG capsule Take 1 capsule (300 mg total) by mouth 3 (three) times daily.    HYDROcodone-acetaminophen (NORCO) 5-325 mg per tablet Take 1 tablet by mouth every 8 (eight) hours as needed for Pain.    ibuprofen (ADVIL,MOTRIN) 600 MG tablet Take 1 tablet (600 mg total) by mouth every 6 (six) hours as needed for Pain.    meloxicam (MOBIC) 15 MG tablet TAKE 1 TABLET BY MOUTH EVERY DAY    omeprazole (PRILOSEC) 20 MG capsule Take 1 capsule (20 mg total) by mouth every morning.    oxybutynin (DITROPAN-XL) 5 MG TR24 Take 1 tablet (5 mg total) by mouth once daily.    ALPRAZolam (XANAX) 0.25 MG tablet Take 1 tablet (0.25 mg total) by mouth 2 (two) times daily as needed for Anxiety (use for CT).     No current facility-administered medications for this visit.         Review of Systems:  Review of Systems   Constitutional:  Negative for activity change, appetite change, fever and unexpected weight change.   Respiratory:  Negative for shortness of breath.    Cardiovascular:  Negative for chest pain.   Gastrointestinal:  Negative for abdominal pain, blood in stool, constipation, diarrhea, nausea and vomiting.    Genitourinary:  Negative for dysmenorrhea, dyspareunia, dysuria, hematuria, menorrhagia, menstrual problem, pelvic pain, vaginal bleeding, vaginal discharge, vaginal pain, urinary incontinence (stress), postcoital bleeding and vaginal odor.   Integumentary:  Negative for breast mass, nipple discharge and breast skin changes.   Breast: Negative for lump, mass, mastodynia, nipple discharge and skin changes       OBJECTIVE:     Physical Exam:  Physical Exam  Vitals reviewed.   Constitutional:       General: She is not in acute distress.     Appearance: She is well-developed. She is not diaphoretic.   HENT:      Head: Normocephalic and atraumatic.   Eyes:      General: No scleral icterus.        Right eye: No discharge.         Left eye: No discharge.      Conjunctiva/sclera: Conjunctivae normal.   Neck:      Thyroid: No thyromegaly.   Cardiovascular:      Rate and Rhythm: Normal rate.   Pulmonary:      Effort: Pulmonary effort is normal.   Chest:   Breasts:     Breasts are symmetrical.      Right: No inverted nipple, mass, nipple discharge, skin change or tenderness.      Left: No inverted nipple, mass, nipple discharge, skin change or tenderness.   Abdominal:      General: There is no distension.      Palpations: Abdomen is soft.      Tenderness: There is no abdominal tenderness.   Genitourinary:     Labia:         Right: No rash, tenderness, lesion or injury.         Left: No rash, tenderness, lesion or injury.       Vagina: Normal. No signs of injury and foreign body. No vaginal discharge, erythema, tenderness or bleeding.      Cervix: No cervical motion tenderness, discharge or friability.      Uterus: Not deviated, not enlarged, not fixed and not tender.       Adnexa:         Right: No mass, tenderness or fullness.          Left: No mass, tenderness or fullness.     Musculoskeletal:         General: Normal range of motion.      Cervical back: Normal range of motion and neck supple.   Lymphadenopathy:       Cervical: No cervical adenopathy.   Skin:     General: Skin is warm and dry.      Findings: No erythema or rash.   Neurological:      Mental Status: She is alert and oriented to person, place, and time.         ASSESSMENT:       ICD-10-CM ICD-9-CM    1. Well woman exam with routine gynecological exam  Z01.419 V72.31              Plan:    WWE  - Vaccines utd  - Pap and co test due 2025  - Mammogram scheduled  - Colonoscopy ordered  - GC/CT, affirm n/a  - Daily vitamin discussed.  - CBE normal. Physical exam normal. VSS.  - RTC for annual or PRN.    Counseling time: 15 minutes    Vicki Mosley

## 2024-11-09 ENCOUNTER — TELEPHONE (OUTPATIENT)
Dept: ENDOSCOPY | Facility: HOSPITAL | Age: 44
End: 2024-11-09

## 2024-11-09 ENCOUNTER — HOSPITAL ENCOUNTER (OUTPATIENT)
Dept: RADIOLOGY | Facility: HOSPITAL | Age: 44
Discharge: HOME | End: 2024-11-09
Attending: STUDENT IN AN ORGANIZED HEALTH CARE EDUCATION/TRAINING PROGRAM
Payer: COMMERCIAL

## 2024-11-09 ENCOUNTER — CLINICAL SUPPORT (OUTPATIENT)
Dept: ENDOSCOPY | Facility: HOSPITAL | Age: 44
End: 2024-11-09
Attending: STUDENT IN AN ORGANIZED HEALTH CARE EDUCATION/TRAINING PROGRAM
Payer: COMMERCIAL

## 2024-11-09 VITALS — HEIGHT: 65 IN | BODY MASS INDEX: 45.32 KG/M2 | WEIGHT: 272 LBS

## 2024-11-09 DIAGNOSIS — Z01.419 WELL WOMAN EXAM WITH ROUTINE GYNECOLOGICAL EXAM: ICD-10-CM

## 2024-11-09 DIAGNOSIS — Z12.11 SPECIAL SCREENING FOR MALIGNANT NEOPLASMS, COLON: Primary | ICD-10-CM

## 2024-11-09 DIAGNOSIS — Z12.31 BREAST CANCER SCREENING BY MAMMOGRAM: ICD-10-CM

## 2024-11-09 DIAGNOSIS — Z12.11 COLON CANCER SCREENING: ICD-10-CM

## 2024-11-09 PROCEDURE — 77067 SCR MAMMO BI INCL CAD: CPT | Mod: TC

## 2024-11-09 RX ORDER — SOD SULF/POT CHLORIDE/MAG SULF 1.479 G
12 TABLET ORAL DAILY
Qty: 24 TABLET | Refills: 0 | Status: SHIPPED | OUTPATIENT
Start: 2024-11-09 | End: 2024-11-13 | Stop reason: HOSPADM

## 2024-11-09 NOTE — TELEPHONE ENCOUNTER
Referral for procedure from PAT appointment      Spoke to patient to schedule procedure(s) Colonoscopy       Physician to perform procedure(s) Dr. MARTIN Starkey  Date of Procedure (s) 11/13/24  Arrival Time 6:30 AM  Time of Procedure(s) 7:30 AM   Location of Procedure(s) [place] [address]  Type of Rx Prep sent to patient: S[facility]ab  Instructions provided to patient via [facility]    Patient was informed on the following information and verbalized understanding. Screening questionnaire reviewed with patient and complete. If procedure requires anesthesia, a responsible adult needs to be present to accompany the patient home, patient cannot drive after receiving anesthesia. Appointment details are tentative, especially check-in time. Patient will receive a prep-op call 7 days prior to confirm check-in time for procedure. If applicable the patient should contact their pharmacy to verify Rx for procedure prep is ready for pick-up. Patient was advised to call the scheduling department at [phone] if pharmacy states no Rx is available. Patient was advised to call the endoscopy scheduling department if any questions or concerns arise.      [facility] Scheduling Department

## 2024-11-11 ENCOUNTER — ANESTHESIA EVENT (OUTPATIENT)
Dept: ENDOSCOPY | Facility: HOSPITAL | Age: 44
End: 2024-11-11
Payer: COMMERCIAL

## 2024-11-11 NOTE — ANESTHESIA PREPROCEDURE EVALUATION
11/11/2024  Basilia Delgado is a 44 y.o., female.    Procedure: COLONOSCOPY, SCREENING, LOW RISK PATIENT (N/A)         Patient Active Problem List   Diagnosis    Anxiety    Endometriosis    Class 3 severe obesity due to excess calories without serious comorbidity with body mass index (BMI) of 40.0 to 44.9 in adult    Prediabetes    Decreased ROM of lumbar spine    Decreased strength of lower extremity       Past Medical History:   Diagnosis Date    Asthma     Endometriosis of uterus 1994    Fibroid 2000       ECHO: No results found for this or any previous visit.      There is no height or weight on file to calculate BMI.    Tobacco Use: Unknown (11/8/2024)    Patient History     Smoking Tobacco Use: Never     Smokeless Tobacco Use: Unknown     Passive Exposure: Not on file       Social History     Substance and Sexual Activity   Drug Use Never        Alcohol Use: Not At Risk (9/27/2024)    AUDIT-C     Frequency of Alcohol Consumption: Never     Average Number of Drinks: Patient does not drink     Frequency of Binge Drinking: Never       Review of patient's allergies indicates:   Allergen Reactions    Cephalosporins Anaphylaxis, Other (See Comments) and Shortness Of Breath     Other Reaction(s): respiratory distress    Penicillins Rash         Airway:  No value filed.    Pre-op Assessment    I have reviewed the Patient Summary Reports.     I have reviewed the Nursing Notes. I have reviewed the NPO Status.   I have reviewed the Medications.     Review of Systems  Anesthesia Hx:  No problems with previous Anesthesia             Denies Family Hx of Anesthesia complications.    Denies Personal Hx of Anesthesia complications.                    Social:  Non-Smoker, No Alcohol Use       Hematology/Oncology:  Hematology Normal   Oncology Normal                                   EENT/Dental:  EENT/Dental Normal            Cardiovascular:  Cardiovascular Normal Exercise tolerance: good                                             Pulmonary:    Asthma                    Renal/:  Renal/ Normal                 Hepatic/GI:  Hepatic/GI Normal Bowel Prep.                   Musculoskeletal:  Musculoskeletal Normal                Neurological:  Neurology Normal                                      Endocrine:  Endocrine Normal          Morbid Obesity / BMI > 40  Dermatological:  Skin Normal    Psych:   anxiety                 Physical Exam  General: Well nourished, Cooperative, Alert and Oriented    Airway:  Mallampati: II   Mouth Opening: Normal  TM Distance: Normal  Tongue: Normal  Neck ROM: Normal ROM    Dental:  Intact    Chest/Lungs:  Normal Respiratory Rate        Anesthesia Plan  Type of Anesthesia, risks & benefits discussed:    Anesthesia Type: Gen Natural Airway  Intra-op Monitoring Plan: Standard ASA Monitors  Post Op Pain Control Plan: multimodal analgesia and IV/PO Opioids PRN  Induction:  IV  Informed Consent: Informed consent signed with the Patient and all parties understand the risks and agree with anesthesia plan.  All questions answered. Patient consented to blood products? No  ASA Score: 2  Day of Surgery Review of History & Physical: H&P Update referred to the surgeon/provider.    Ready For Surgery From Anesthesia Perspective.     .

## 2024-11-13 ENCOUNTER — ANESTHESIA (OUTPATIENT)
Dept: ENDOSCOPY | Facility: HOSPITAL | Age: 44
End: 2024-11-13
Payer: COMMERCIAL

## 2024-11-13 ENCOUNTER — HOSPITAL ENCOUNTER (OUTPATIENT)
Facility: HOSPITAL | Age: 44
Discharge: HOME | End: 2024-11-13
Attending: INTERNAL MEDICINE | Admitting: INTERNAL MEDICINE
Payer: COMMERCIAL

## 2024-11-13 VITALS
HEIGHT: 64 IN | OXYGEN SATURATION: 97 % | BODY MASS INDEX: 42.68 KG/M2 | HEART RATE: 77 BPM | DIASTOLIC BLOOD PRESSURE: 70 MMHG | RESPIRATION RATE: 19 BRPM | TEMPERATURE: 98 F | WEIGHT: 250 LBS | SYSTOLIC BLOOD PRESSURE: 137 MMHG

## 2024-11-13 DIAGNOSIS — Z12.11 SCREEN FOR COLON CANCER: ICD-10-CM

## 2024-11-13 DIAGNOSIS — Z12.11 COLON CANCER SCREENING: Primary | ICD-10-CM

## 2024-11-13 PROCEDURE — 25000003 PHARM REV CODE 250: Performed by: NURSE ANESTHETIST, CERTIFIED REGISTERED

## 2024-11-13 PROCEDURE — 88305 TISSUE EXAM BY PATHOLOGIST: CPT | Performed by: STUDENT IN AN ORGANIZED HEALTH CARE EDUCATION/TRAINING PROGRAM

## 2024-11-13 PROCEDURE — 37000008 HC ANESTHESIA 1ST 15 MINUTES: Performed by: INTERNAL MEDICINE

## 2024-11-13 PROCEDURE — 45385 COLONOSCOPY W/LESION REMOVAL: CPT | Mod: 33 | Performed by: INTERNAL MEDICINE

## 2024-11-13 PROCEDURE — 63600175 PHARM REV CODE 636 W HCPCS: Performed by: NURSE ANESTHETIST, CERTIFIED REGISTERED

## 2024-11-13 PROCEDURE — 99900035 HC TECH TIME PER 15 MIN (STAT)

## 2024-11-13 PROCEDURE — 45385 COLONOSCOPY W/LESION REMOVAL: CPT | Mod: 33,,, | Performed by: INTERNAL MEDICINE

## 2024-11-13 PROCEDURE — 94761 N-INVAS EAR/PLS OXIMETRY MLT: CPT

## 2024-11-13 PROCEDURE — A4216 STERILE WATER/SALINE, 10 ML: HCPCS | Performed by: NURSE ANESTHETIST, CERTIFIED REGISTERED

## 2024-11-13 PROCEDURE — 27201089 HC SNARE, DISP (ANY): Performed by: INTERNAL MEDICINE

## 2024-11-13 PROCEDURE — 37000009 HC ANESTHESIA EA ADD 15 MINS: Performed by: INTERNAL MEDICINE

## 2024-11-13 RX ORDER — PROPOFOL 10 MG/ML
VIAL (ML) INTRAVENOUS
Status: DISCONTINUED | OUTPATIENT
Start: 2024-11-13 | End: 2024-11-13

## 2024-11-13 RX ORDER — SODIUM CHLORIDE 0.9 % (FLUSH) 0.9 %
SYRINGE (ML) INJECTION
Status: DISCONTINUED | OUTPATIENT
Start: 2024-11-13 | End: 2024-11-13

## 2024-11-13 RX ORDER — LIDOCAINE HYDROCHLORIDE 20 MG/ML
INJECTION INTRAVENOUS
Status: DISCONTINUED | OUTPATIENT
Start: 2024-11-13 | End: 2024-11-13

## 2024-11-13 RX ADMIN — LIDOCAINE HYDROCHLORIDE 100 MG: 20 INJECTION INTRAVENOUS at 08:11

## 2024-11-13 RX ADMIN — SODIUM CHLORIDE 10 ML: 9 INJECTION, SOLUTION INTRAMUSCULAR; INTRAVENOUS; SUBCUTANEOUS at 08:11

## 2024-11-13 RX ADMIN — PROPOFOL 100 MG: 10 INJECTION, EMULSION INTRAVENOUS at 08:11

## 2024-11-13 RX ADMIN — PROPOFOL 40 MG: 10 INJECTION, EMULSION INTRAVENOUS at 08:11

## 2024-11-13 RX ADMIN — PROPOFOL 125 MCG/KG/MIN: 10 INJECTION, EMULSION INTRAVENOUS at 08:11

## 2024-11-13 NOTE — PLAN OF CARE
Pt arrived to recovery dosc via stretcher per ENDO team. Bedside report received. Pt attached to bedside monitor. VSS. Pt _responds to voice__ post procedure. Pt on room air; oxygen sats _95___%. Pt IV access clean, dry and intact,  Saline locked. Will continue to monitor.

## 2024-11-13 NOTE — PLAN OF CARE
Pt in preop bay 5, VSS and IV inserted. Pt denies any open wounds on body or the use of any weight loss injections. Pt needs an updated H&P, procedural consents, and anesthesia consents, otherwise ready to roll.

## 2024-11-13 NOTE — PROVATION PATIENT INSTRUCTIONS
Discharge Summary/Instructions after an Endoscopic Procedure  Patient Name: Basilia Delgado  Patient MRN: [id number]  Patient YOB: 1980 Wednesday, November 13, 2024  Gordon Starkey MD  Dear patient,  As a result of recent federal legislation (The Federal Cures Act), you may   receive lab or pathology results from your procedure in your [facility]   account before your physician is able to contact you. Your physician or   their representative will relay the results to you with their   recommendations at their soonest availability.  Thank you,  RESTRICTIONS:  During your procedure today, you received medications for sedation.  These   medications may affect your judgment, balance and coordination.  Therefore,   for 24 hours, you have the following restrictions:   - DO NOT drive a car, operate machinery, make legal/financial decisions,   sign important papers or drink alcohol.    ACTIVITY:  Today: no heavy lifting, straining or running due to procedural   sedation/anesthesia.  The following day: return to full activity including work.  DIET:  Eat and drink normally unless instructed otherwise.     TREATMENT FOR COMMON SIDE EFFECTS:  - Mild abdominal pain, nausea, belching, bloating or excessive gas:  rest,   eat lightly and use a heating pad.  - Sore Throat: treat with throat lozenges and/or gargle with warm salt   water.  - Because air was used during the procedure, expelling large amounts of air   from your rectum or belching is normal.  - If a bowel prep was taken, you may not have a bowel movement for 1-3 days.    This is normal.  SYMPTOMS TO WATCH FOR AND REPORT TO YOUR PHYSICIAN:  1. Abdominal pain or bloating, other than gas cramps.  2. Chest pain.  3. Back pain.  4. Signs of infection such as: chills or fever occurring within 24 hours   after the procedure.  5. Rectal bleeding, which would show as bright red, maroon, or black stools.   (A tablespoon of blood from the rectum is not serious, especially  if   hemorrhoids are present.)  6. Vomiting.  7. Weakness or dizziness.  GO DIRECTLY TO THE NEAREST EMERGENCY ROOM IF YOU HAVE ANY OF THE FOLLOWING:      Difficulty breathing              Chills and/or fever over 101 F   Persistent vomiting and/or vomiting blood   Severe abdominal pain   Severe chest pain   Black, tarry stools   Bleeding- more than one tablespoon   Any other symptom or condition that you feel may need urgent attention  Your doctor recommends these additional instructions:  If any biopsies were taken, your doctors clinic will contact you in 1 to 2   weeks with any results.  - Patient has a contact number available for emergencies.  The signs and   symptoms of potential delayed complications were discussed with the   patient.  Return to normal activities tomorrow.  Written discharge   instructions were provided to the patient.   - Discharge patient to home.   - Resume previous diet.   - Continue present medications.   - Await pathology results.   - Repeat colonoscopy in 5 years for surveillance.   For questions, problems or results please call your physician - Gordon Starkey MD at Work:  [phone].  [facility], EMERGENCY ROOM PHONE NUMBER: [phone]  IF A COMPLICATION OR EMERGENCY SITUATION ARISES AND YOU ARE UNABLE TO REACH   YOUR PHYSICIAN - GO DIRECTLY TO THE EMERGENCY ROOM.  Gordon Starkey MD  11/13/2024 8:26:33 AM  This report has been verified and signed electronically.  Dear patient,  As a result of recent federal legislation (The Federal Cures Act), you may   receive lab or pathology results from your procedure in your [facility]   account before your physician is able to contact you. Your physician or   their representative will relay the results to you with their   recommendations at their soonest availability.  Thank you,  PROVATION

## 2024-11-13 NOTE — ANESTHESIA POSTPROCEDURE EVALUATION
Anesthesia Post Evaluation    Patient: Basilia Austin Ural    Procedure(s) Performed: Procedure(s) (LRB):  COLONOSCOPY, SCREENING, LOW RISK PATIENT (N/A)    Final Anesthesia Type: general      Patient location during evaluation: PACU  Patient participation: Yes- Able to Participate  Level of consciousness: awake and alert  Post-procedure vital signs: reviewed and stable  Pain management: adequate  Airway patency: patent    PONV status at discharge: No PONV  Anesthetic complications: no      Cardiovascular status: blood pressure returned to baseline  Respiratory status: unassisted  Hydration status: euvolemic  Follow-up not needed.          Vitals Value Taken Time   /70 11/13/24 0845   Temp 36.5 °C (97.7 °F) 11/13/24 0829   Pulse 78 11/13/24 0850   Resp 26 11/13/24 0850   SpO2 97 % 11/13/24 0850   Vitals shown include unfiled device data.      Event Time   Out of Recovery 08:45:00         Pain/Renny Score: Renny Score: 10 (11/13/2024  8:45 AM)

## 2024-11-13 NOTE — PLAN OF CARE
Discharge instructions given to patient with verbalization of understanding all.  Written report of procedure provided by MD and given to patient. MD came to bedside and gave report of findings. VSS, denies n/v and tolerating PO, rates pain level tolerable. IV removed, and family notified for patient discharge home.

## 2024-11-13 NOTE — H&P
Short Stay Endoscopy History and Physical    PCP - Leeann Green MD    Procedure - Colonoscopy  Sedation: GA  ASA - per anesthesia  Mallampati - per anesthesia  History of Anesthesia problems - no  Family history Anesthesia problems -  no     HPI:  This is a 44 y.o. female here for evaluation of : Screening for CRC    Reflux - no  Dysphagia - no  Abdominal pain - no  Diarrhea - no    ROS:  Constitutional: No fevers, chills, No weight loss  ENT: No allergies  CV: No chest pain  Pulm: No cough, No shortness of breath  Ophtho: No vision changes  GI: see HPI  Medical History:  has a past medical history of Asthma, Endometriosis of uterus (1994), and Fibroid (2000).    Surgical History:  has a past surgical history that includes Adenoidectomy (1986); Appendectomy (1995); Tonsillectomy (1986); Cholecystectomy (2008); Laparoscopy; Laparotomy; Endometrial ablation (N/A, 05/03/2023); and Pelvic laparoscopy (1994, 1994, 1995, 1995, 1996, 1996).    Family History: family history includes Alcohol abuse in her father and paternal grandfather; Asthma in her mother; Cancer in her mother; Drug abuse in her brother; Kidney cancer in her paternal grandmother; Lung cancer in her maternal grandmother; Prostate cancer in her maternal grandfather; Skin cancer in her paternal grandfather; Thyroid cancer in her mother.. Otherwise no colon cancer, inflammatory bowel disease, or GI malignancies.    Social History:  reports that she has never smoked. She does not have any smokeless tobacco history on file. She reports that she does not drink alcohol and does not use drugs.    Review of patient's allergies indicates:   Allergen Reactions    Cephalosporins Anaphylaxis, Other (See Comments) and Shortness Of Breath     Other Reaction(s): respiratory distress    Penicillins Rash       Medications:   Medications Prior to Admission   Medication Sig Dispense Refill Last Dose/Taking    ALPRAZolam (XANAX) 0.25 MG tablet Take 1 tablet (0.25 mg  total) by mouth 2 (two) times daily as needed for Anxiety (use for CT). 10 tablet 0 Past Month    cetirizine (ZYRTEC) 10 MG tablet Take 1 tablet (10 mg total) by mouth once daily. 30 tablet 12 Past Week    cyclobenzaprine (FLEXERIL) 10 MG tablet Take 1 tablet (10 mg total) by mouth 3 (three) times daily as needed for Muscle spasms. 60 tablet 1 Past Month    EScitalopram oxalate (LEXAPRO) 10 MG tablet TAKE 1 TABLET BY MOUTH ONCE DAILY 90 tablet 0 Past Week    famotidine (PEPCID) 40 MG tablet Take 0.5 tablets (20 mg total) by mouth nightly. for 365 doses 15 tablet 11 Past Week    gabapentin (NEURONTIN) 300 MG capsule Take 1 capsule (300 mg total) by mouth 3 (three) times daily. 60 capsule 1 Past Month    meloxicam (MOBIC) 15 MG tablet TAKE 1 TABLET BY MOUTH EVERY DAY 30 tablet 3 Past Month    omeprazole (PRILOSEC) 20 MG capsule Take 1 capsule (20 mg total) by mouth every morning. 30 capsule 11 Past Week    albuterol (PROVENTIL/VENTOLIN HFA) 90 mcg/actuation inhaler INHALE 1-2 PUFFS BY MOUTH INTO THE LUNGS EVERY 6 HOURS AS NEEDED FOR WHEEZING. (RESCUE) 16 g 1 More than a month    fluticasone propionate (FLONASE) 50 mcg/actuation nasal spray 1 spray by Each Nostril route 2 (two) times daily.       HYDROcodone-acetaminophen (NORCO) 5-325 mg per tablet Take 1 tablet by mouth every 8 (eight) hours as needed for Pain. 5 tablet 0 More than a month    ibuprofen (ADVIL,MOTRIN) 600 MG tablet Take 1 tablet (600 mg total) by mouth every 6 (six) hours as needed for Pain. 15 tablet 1 More than a month    oxybutynin (DITROPAN-XL) 5 MG TR24 Take 1 tablet (5 mg total) by mouth once daily. 30 tablet 11     sod sulf-pot chloride-mag sulf (SUTAB) 1.479-0.188- 0.225 gram tablet Take 12 tablets by mouth once daily. Please run insurance first then apply coupon: BIN: [id number], PCN: [id number], GROUP: [id number], MEMBER ID: [id number] 24 tablet 0        Objective Findings:    Vital Signs: Per nursing notes.    Physical Exam:  General Appearance: Well  appearing in no acute distress  Head:   Normocephalic, without obvious abnormality  Eyes:    No scleral icterus  Airway: Open  Neck: No restriction in mobility  Lungs: CTA bilaterally in anterior and posterior fields, no wheezes, no crackles.  Heart:  Regular rate and rhythm, S1, S2 normal, no murmurs heard  Abdomen: Soft, non tender, non distended      Labs:  Lab Results   Component Value Date    WBC 6.82 09/19/2024    HGB 14.2 09/19/2024    HCT 43.8 09/19/2024     09/19/2024    CHOL 179 10/03/2023    TRIG 84 10/03/2023    HDL 47 10/03/2023    ALT 18 10/03/2023    AST 17 10/03/2023     09/19/2024    K 3.8 09/19/2024     09/19/2024    CREATININE 0.8 09/19/2024    BUN 10 09/19/2024    CO2 27 09/19/2024    TSH 1.853 09/30/2022    HGBA1C 6.3 (H) 10/03/2023         I have explained the risks and benefits of endoscopy procedures to the patient including but not limited to bleeding, perforation, infection, and death.    Thank you so much for allowing me to participate in the care of Basilia Starkey MD

## 2024-11-13 NOTE — TRANSFER OF CARE
"Anesthesia Transfer of Care Note    Patient: Basilia Austin Ural    Procedure(s) Performed: Procedure(s) (LRB):  COLONOSCOPY, SCREENING, LOW RISK PATIENT (N/A)    Patient location: PACU    Anesthesia Type: general    Transport from OR: Transported from OR on room air with adequate spontaneous ventilation    Post pain: adequate analgesia    Post assessment: no apparent anesthetic complications    Post vital signs: stable    Level of consciousness: awake and oriented    Nausea/Vomiting: no nausea/vomiting    Complications: none    Transfer of care protocol was followed      Last vitals: Visit Vitals  BP (!) 133/90 (BP Location: Left arm, Patient Position: Lying)   Pulse 84   Temp 36.4 °C (97.5 °F) (Temporal)   Resp 14   Ht 5' 4" (1.626 m)   Wt 113.4 kg (250 lb)   SpO2 96%   Breastfeeding No   BMI 42.91 kg/m²     "

## 2024-11-16 LAB
FINAL PATHOLOGIC DIAGNOSIS: NORMAL
GROSS: NORMAL
Lab: NORMAL

## 2024-11-19 ENCOUNTER — TELEPHONE (OUTPATIENT)
Dept: GASTROENTEROLOGY | Facility: CLINIC | Age: 44
End: 2024-11-19
Payer: COMMERCIAL

## 2024-12-16 ENCOUNTER — LAB VISIT (OUTPATIENT)
Dept: LAB | Facility: HOSPITAL | Age: 44
End: 2024-12-16
Attending: INTERNAL MEDICINE
Payer: COMMERCIAL

## 2024-12-16 ENCOUNTER — OFFICE VISIT (OUTPATIENT)
Dept: PRIMARY CARE CLINIC | Facility: CLINIC | Age: 44
End: 2024-12-16
Payer: COMMERCIAL

## 2024-12-16 VITALS
HEART RATE: 87 BPM | BODY MASS INDEX: 46.55 KG/M2 | WEIGHT: 272.69 LBS | OXYGEN SATURATION: 97 % | HEIGHT: 64 IN | SYSTOLIC BLOOD PRESSURE: 124 MMHG | DIASTOLIC BLOOD PRESSURE: 86 MMHG

## 2024-12-16 DIAGNOSIS — Z00.00 ANNUAL PHYSICAL EXAM: ICD-10-CM

## 2024-12-16 DIAGNOSIS — F41.9 ANXIETY: ICD-10-CM

## 2024-12-16 DIAGNOSIS — R73.03 PREDIABETES: Primary | ICD-10-CM

## 2024-12-16 DIAGNOSIS — E66.01 CLASS 3 SEVERE OBESITY DUE TO EXCESS CALORIES WITHOUT SERIOUS COMORBIDITY WITH BODY MASS INDEX (BMI) OF 45.0 TO 49.9 IN ADULT: ICD-10-CM

## 2024-12-16 DIAGNOSIS — E66.813 CLASS 3 SEVERE OBESITY DUE TO EXCESS CALORIES WITHOUT SERIOUS COMORBIDITY WITH BODY MASS INDEX (BMI) OF 45.0 TO 49.9 IN ADULT: ICD-10-CM

## 2024-12-16 DIAGNOSIS — R73.03 PREDIABETES: ICD-10-CM

## 2024-12-16 DIAGNOSIS — S15.002A: ICD-10-CM

## 2024-12-16 PROBLEM — R29.898 DECREASED STRENGTH OF LOWER EXTREMITY: Status: RESOLVED | Noted: 2024-05-14 | Resolved: 2024-12-16

## 2024-12-16 PROBLEM — M53.86 DECREASED ROM OF LUMBAR SPINE: Status: RESOLVED | Noted: 2024-05-14 | Resolved: 2024-12-16

## 2024-12-16 LAB
ALBUMIN SERPL BCP-MCNC: 3.3 G/DL (ref 3.5–5.2)
ALP SERPL-CCNC: 86 U/L (ref 40–150)
ALT SERPL W/O P-5'-P-CCNC: 15 U/L (ref 10–44)
ANION GAP SERPL CALC-SCNC: 7 MMOL/L (ref 8–16)
AST SERPL-CCNC: 18 U/L (ref 10–40)
BASOPHILS # BLD AUTO: 0.04 K/UL (ref 0–0.2)
BASOPHILS NFR BLD: 0.7 % (ref 0–1.9)
BILIRUB SERPL-MCNC: 0.4 MG/DL (ref 0.1–1)
BUN SERPL-MCNC: 8 MG/DL (ref 6–20)
CALCIUM SERPL-MCNC: 8.5 MG/DL (ref 8.7–10.5)
CHLORIDE SERPL-SCNC: 106 MMOL/L (ref 95–110)
CHOLEST SERPL-MCNC: 171 MG/DL (ref 120–199)
CHOLEST/HDLC SERPL: 3.9 {RATIO} (ref 2–5)
CO2 SERPL-SCNC: 25 MMOL/L (ref 23–29)
CREAT SERPL-MCNC: 0.8 MG/DL (ref 0.5–1.4)
DIFFERENTIAL METHOD BLD: ABNORMAL
EOSINOPHIL # BLD AUTO: 0.1 K/UL (ref 0–0.5)
EOSINOPHIL NFR BLD: 2.4 % (ref 0–8)
ERYTHROCYTE [DISTWIDTH] IN BLOOD BY AUTOMATED COUNT: 13.3 % (ref 11.5–14.5)
EST. GFR  (NO RACE VARIABLE): >60 ML/MIN/1.73 M^2
ESTIMATED AVG GLUCOSE: 143 MG/DL (ref 68–131)
GLUCOSE SERPL-MCNC: 126 MG/DL (ref 70–110)
HBA1C MFR BLD: 6.6 % (ref 4–5.6)
HCT VFR BLD AUTO: 42.5 % (ref 37–48.5)
HDLC SERPL-MCNC: 44 MG/DL (ref 40–75)
HDLC SERPL: 25.7 % (ref 20–50)
HGB BLD-MCNC: 13.3 G/DL (ref 12–16)
IMM GRANULOCYTES # BLD AUTO: 0.02 K/UL (ref 0–0.04)
IMM GRANULOCYTES NFR BLD AUTO: 0.4 % (ref 0–0.5)
LDLC SERPL CALC-MCNC: 113.6 MG/DL (ref 63–159)
LYMPHOCYTES # BLD AUTO: 2.1 K/UL (ref 1–4.8)
LYMPHOCYTES NFR BLD: 37.5 % (ref 18–48)
MCH RBC QN AUTO: 29.9 PG (ref 27–31)
MCHC RBC AUTO-ENTMCNC: 31.3 G/DL (ref 32–36)
MCV RBC AUTO: 96 FL (ref 82–98)
MONOCYTES # BLD AUTO: 0.5 K/UL (ref 0.3–1)
MONOCYTES NFR BLD: 8.4 % (ref 4–15)
NEUTROPHILS # BLD AUTO: 2.8 K/UL (ref 1.8–7.7)
NEUTROPHILS NFR BLD: 50.6 % (ref 38–73)
NONHDLC SERPL-MCNC: 127 MG/DL
NRBC BLD-RTO: 0 /100 WBC
PLATELET # BLD AUTO: 230 K/UL (ref 150–450)
PMV BLD AUTO: 11.5 FL (ref 9.2–12.9)
POTASSIUM SERPL-SCNC: 4.1 MMOL/L (ref 3.5–5.1)
PROT SERPL-MCNC: 6.2 G/DL (ref 6–8.4)
RBC # BLD AUTO: 4.45 M/UL (ref 4–5.4)
SODIUM SERPL-SCNC: 138 MMOL/L (ref 136–145)
TRIGL SERPL-MCNC: 67 MG/DL (ref 30–150)
WBC # BLD AUTO: 5.5 K/UL (ref 3.9–12.7)

## 2024-12-16 PROCEDURE — 99396 PREV VISIT EST AGE 40-64: CPT | Mod: S$GLB,,, | Performed by: INTERNAL MEDICINE

## 2024-12-16 PROCEDURE — 83036 HEMOGLOBIN GLYCOSYLATED A1C: CPT | Performed by: INTERNAL MEDICINE

## 2024-12-16 PROCEDURE — 80053 COMPREHEN METABOLIC PANEL: CPT | Performed by: INTERNAL MEDICINE

## 2024-12-16 PROCEDURE — 3074F SYST BP LT 130 MM HG: CPT | Mod: CPTII,S$GLB,, | Performed by: INTERNAL MEDICINE

## 2024-12-16 PROCEDURE — 3079F DIAST BP 80-89 MM HG: CPT | Mod: CPTII,S$GLB,, | Performed by: INTERNAL MEDICINE

## 2024-12-16 PROCEDURE — 1159F MED LIST DOCD IN RCRD: CPT | Mod: CPTII,S$GLB,, | Performed by: INTERNAL MEDICINE

## 2024-12-16 PROCEDURE — 3008F BODY MASS INDEX DOCD: CPT | Mod: CPTII,S$GLB,, | Performed by: INTERNAL MEDICINE

## 2024-12-16 PROCEDURE — 99999 PR PBB SHADOW E&M-EST. PATIENT-LVL III: CPT | Mod: PBBFAC,,, | Performed by: INTERNAL MEDICINE

## 2024-12-16 PROCEDURE — 80061 LIPID PANEL: CPT | Performed by: INTERNAL MEDICINE

## 2024-12-16 PROCEDURE — 85025 COMPLETE CBC W/AUTO DIFF WBC: CPT | Performed by: INTERNAL MEDICINE

## 2024-12-16 RX ORDER — ESCITALOPRAM OXALATE 20 MG/1
20 TABLET ORAL DAILY
Qty: 90 TABLET | Refills: 3 | Status: SHIPPED | OUTPATIENT
Start: 2024-12-16

## 2024-12-16 NOTE — ASSESSMENT & PLAN NOTE
Interested in lexapro 20 mg, feels like she could be doing better than she is on 10 mg.    From previous visit:  Feels very anxious all the time.  Worries about things that she shouldn't. Gets anxiety in car wash.

## 2024-12-16 NOTE — PROGRESS NOTES
[facility] Note    Chief Complaint      Chief Complaint   Patient presents with    Annual Exam     History of Present Illness      Basilia Delgado is a 44 y.o. female who presents today for Annual preventative visit.  Patient comes to appointment alone.  GYN: [facility]        Problem List Items Addressed This Visit       Anxiety    Current Assessment & Plan     Interested in lexapro 20 mg, feels like she could be doing better than she is on 10 mg.    From previous visit:  Feels very anxious all the time.  Worries about things that she shouldn't. Gets anxiety in car wash.         Relevant Medications    EScitalopram oxalate (LEXAPRO) 20 MG tablet    Class 3 severe obesity due to excess calories without serious comorbidity with body mass index (BMI) of 45.0 to 49.9 in adult    Current Assessment & Plan     Gained weight since last year. Has a treadmill but hasn't done it yet.  Cooking most meals.          Injury of left carotid artery    Current Assessment & Plan     Was having pain on left side of neck/throat.  Had CT during episode with ENT Dr. Ferreira concerning for TIPIC syndrome.  Put on NSAIDS, plan for steroid for bad episodes. Seen by Rheum with negative AI workup.         Prediabetes - Primary    Current Assessment & Plan     A1C 5.9 in 9/2022. Not on meds.         Relevant Orders    Hemoglobin A1C     Other Visit Diagnoses       Annual physical exam        Relevant Orders    CBC Auto Differential    Lipid Panel    Comprehensive Metabolic Panel              Health Maintenance   Topic Date Due    Influenza Vaccine (1) 09/01/2024    COVID-19 Vaccine (5 - 2024-25 season) 09/01/2024    Hemoglobin A1c (Prediabetes)  10/03/2024    Mammogram  11/09/2025    Cervical Cancer Screening  10/20/2027    Lipid Panel  10/03/2028    Colorectal Cancer Screening  11/13/2029    TETANUS VACCINE  11/06/2030    RSV Vaccine (Age 60+ and Pregnant patients) (1 - 1-dose 75+ series) 08/25/2054    Hepatitis C Screening   Completed    HIV Screening  Completed    Pneumococcal Vaccines (Age 0-64)  Aged Out       Past Medical History:   Diagnosis Date    Asthma     Endometriosis of uterus 1994    Fibroid 2000       Past Surgical History:   Procedure Laterality Date    ADENOIDECTOMY  1986    APPENDECTOMY  1995    CHOLECYSTECTOMY  2008    COLONOSCOPY, SCREENING, LOW RISK PATIENT N/A 11/13/2024    Procedure: COLONOSCOPY, SCREENING, LOW RISK PATIENT;  Surgeon: Gordon Starkey MD;  Location: [facility] ENDOSCOPY;  Service: Endoscopy;  Laterality: N/A;  11/9 ref by Vicki Mosley MD, [place], portal-st    ENDOMETRIAL ABLATION N/A 05/03/2023    Procedure: ABLATION, ENDOMETRIUM;  Surgeon: Vicki Mosley MD;  Location: [facility] OR;  Service: OB/GYN;  Laterality: N/A;    LAPAROSCOPY      x 5    LAPAROTOMY      x 1    PELVIC LAPAROSCOPY  1994, 1994, 1995, 1995, 1996, 1996    TONSILLECTOMY  1986       family history includes Alcohol abuse in her father and paternal grandfather; Asthma in her mother; Cancer in her mother; Drug abuse in her brother; Kidney cancer in her paternal grandmother; Lung cancer in her maternal grandmother; Prostate cancer in her maternal grandfather; Skin cancer in her paternal grandfather; Thyroid cancer in her mother.    Social History     Tobacco Use    Smoking status: Never   Substance Use Topics    Alcohol use: Never    Drug use: Never       Review of Systems   HENT:  Negative for hearing loss.    Eyes:  Negative for discharge.   Respiratory:  Negative for wheezing.    Cardiovascular:  Negative for chest pain and palpitations.   Gastrointestinal:  Negative for blood in stool, constipation, diarrhea and vomiting.   Genitourinary:  Negative for dysuria and hematuria.   Musculoskeletal:  Negative for neck pain.   Neurological:  Negative for weakness and headaches.   Endo/Heme/Allergies:  Negative for polydipsia.        Outpatient Encounter Medications as of 12/16/2024   Medication Sig Dispense Refill    albuterol  (PROVENTIL/VENTOLIN HFA) 90 mcg/actuation inhaler INHALE 1-2 PUFFS BY MOUTH INTO THE LUNGS EVERY 6 HOURS AS NEEDED FOR WHEEZING. (RESCUE) 16 g 1    ALPRAZolam (XANAX) 0.25 MG tablet Take 1 tablet (0.25 mg total) by mouth 2 (two) times daily as needed for Anxiety (use for CT). 10 tablet 0    cetirizine (ZYRTEC) 10 MG tablet Take 1 tablet (10 mg total) by mouth once daily. 30 tablet 12    cyclobenzaprine (FLEXERIL) 10 MG tablet Take 1 tablet (10 mg total) by mouth 3 (three) times daily as needed for Muscle spasms. 60 tablet 1    famotidine (PEPCID) 40 MG tablet Take 0.5 tablets (20 mg total) by mouth nightly. for 365 doses 15 tablet 11    fluticasone propionate (FLONASE) 50 mcg/actuation nasal spray 1 spray by Each Nostril route 2 (two) times daily.      gabapentin (NEURONTIN) 300 MG capsule Take 1 capsule (300 mg total) by mouth 3 (three) times daily. 60 capsule 1    meloxicam (MOBIC) 15 MG tablet TAKE 1 TABLET BY MOUTH EVERY DAY 30 tablet 3    omeprazole (PRILOSEC) 20 MG capsule Take 1 capsule (20 mg total) by mouth every morning. 30 capsule 11    [DISCONTINUED] EScitalopram oxalate (LEXAPRO) 10 MG tablet TAKE 1 TABLET BY MOUTH ONCE DAILY 90 tablet 0    EScitalopram oxalate (LEXAPRO) 20 MG tablet Take 1 tablet (20 mg total) by mouth once daily. 90 tablet 3    [DISCONTINUED] HYDROcodone-acetaminophen (NORCO) 5-325 mg per tablet Take 1 tablet by mouth every 8 (eight) hours as needed for Pain. (Patient not taking: Reported on 12/16/2024) 5 tablet 0    [DISCONTINUED] ibuprofen (ADVIL,MOTRIN) 600 MG tablet Take 1 tablet (600 mg total) by mouth every 6 (six) hours as needed for Pain. (Patient not taking: Reported on 12/16/2024) 15 tablet 1     No facility-administered encounter medications on file as of 12/16/2024.        Review of patient's allergies indicates:   Allergen Reactions    Cephalosporins Anaphylaxis, Other (See Comments) and Shortness Of Breath     Other Reaction(s): respiratory distress    Penicillins Rash  "      Physical Exam      Vital Signs  Pulse: 87  SpO2: 97 %  BP: 124/86  Pain Score: 0-No pain  Height and Weight  Height: 5' 4" (162.6 cm)  Weight: 123.7 kg (272 lb 11.3 oz)  BSA (Calculated - sq m): 2.36 sq meters  BMI (Calculated): 46.8  Weight in (lb) to have BMI = 25: 145.3]    Physical Exam     Laboratory:  CBC:  Recent Labs   Lab Result Units 09/19/24  1557   WBC K/uL 6.82   RBC M/uL 4.74   Hemoglobin g/dL 14.2   Hematocrit % 43.8   Platelets K/uL 231   MCV fL 92   MCH pg 30.0   MCHC g/dL 32.4     CMP:  Recent Labs   Lab Result Units 09/19/24  1557   Glucose mg/dL 95   Calcium mg/dL 9.4   Sodium mmol/L 141   Potassium mmol/L 3.8   CO2 mmol/L 27   Chloride mmol/L 103   BUN mg/dL 10     URINALYSIS:  Recent Labs   Lab Result Units 10/03/24  1348   Color, UA  Colorless*   Specific Gravity, UA  1.010   pH, UA  6.0   Protein, UA  Negative   Bacteria /hpf Occasional   Nitrite, UA  Negative   Leukocytes, UA  2+*      LIPIDS:  No results for input(s): "TSH", "HDL", "CHOL", "TRIG", "LDLCALC", "CHOLHDL", "NONHDLCHOL", "TOTALCHOLEST" in the last 2160 hours.  TSH:  No results for input(s): "TSH" in the last 2160 hours.  A1C:  No results for input(s): "HGBA1C" in the last 2160 hours.    Radiology:  No results found in the last 30 days.     Assessment/Plan     Basilia Delgado is a 44 y.o.female with:    1. Prediabetes  - Hemoglobin A1C; Future    2. Anxiety  - EScitalopram oxalate (LEXAPRO) 20 MG tablet; Take 1 tablet (20 mg total) by mouth once daily.  Dispense: 90 tablet; Refill: 3    3. Annual physical exam  - CBC Auto Differential; Future  - Lipid Panel; Future  - Comprehensive Metabolic Panel; Future    4. Class 3 severe obesity due to excess calories without serious comorbidity with body mass index (BMI) of 45.0 to 49.9 in adult    5. Injury of left carotid artery, initial encounter      -check labs  -Continue current medications and maintain follow up with specialists.    -Follow up in about 1 year (around " 12/16/2025) for Annual Visit.       Leeann Green MD  [facility]                    Answers submitted by the patient for this visit:  Review of Systems Questionnaire (Submitted on 12/15/2024)  activity change: No  unexpected weight change: No  rhinorrhea: No  trouble swallowing: No  visual disturbance: No  chest tightness: No  polyuria: No  difficulty urinating: No  menstrual problem: No  joint swelling: No  arthralgias: No  confusion: No  dysphoric mood: No

## 2024-12-16 NOTE — ASSESSMENT & PLAN NOTE
Was having pain on left side of neck/throat.  Had CT during episode with ENT Dr. Ferreira concerning for TIPIC syndrome.  Put on NSAIDS, plan for steroid for bad episodes. Seen by Rheum with negative AI workup.

## 2024-12-17 ENCOUNTER — PATIENT MESSAGE (OUTPATIENT)
Dept: PRIMARY CARE CLINIC | Facility: CLINIC | Age: 44
End: 2024-12-17
Payer: COMMERCIAL

## 2024-12-17 DIAGNOSIS — E11.9 TYPE 2 DIABETES MELLITUS WITHOUT COMPLICATION, WITHOUT LONG-TERM CURRENT USE OF INSULIN: Primary | ICD-10-CM

## 2024-12-18 RX ORDER — TIRZEPATIDE 2.5 MG/.5ML
2.5 INJECTION, SOLUTION SUBCUTANEOUS
Qty: 2 ML | Refills: 0 | Status: SHIPPED | OUTPATIENT
Start: 2024-12-18

## 2025-01-28 DIAGNOSIS — E11.9 TYPE 2 DIABETES MELLITUS WITHOUT COMPLICATION, WITHOUT LONG-TERM CURRENT USE OF INSULIN: ICD-10-CM

## 2025-01-28 NOTE — TELEPHONE ENCOUNTER
No care due was identified.  [facility] Embedded Care Due Messages. Reference number: [id number].   1/28/2025 12:47:21 PM CST

## 2025-01-29 RX ORDER — TIRZEPATIDE 2.5 MG/.5ML
2.5 INJECTION, SOLUTION SUBCUTANEOUS
Qty: 12 PEN | Refills: 1 | Status: SHIPPED | OUTPATIENT
Start: 2025-01-29

## 2025-01-29 NOTE — TELEPHONE ENCOUNTER
Refill Routing Note   Medication(s) are not appropriate for processing by [facility] for the following reason(s):        New or recently adjusted medication    [facility] action(s):  Defer        Medication Therapy Plan: New start (12/18/24)      Appointments  past 12m or future 3m with PCP    Date Provider   Last Visit   12/16/2024 Leeann Green MD   Next Visit   Visit date not found Leeann Green MD   ED visits in past 90 days: 0        Note composed:8:58 PM 01/28/2025

## 2025-03-25 ENCOUNTER — OFFICE VISIT (OUTPATIENT)
Dept: OTOLARYNGOLOGY | Facility: CLINIC | Age: 45
End: 2025-03-25
Payer: COMMERCIAL

## 2025-03-25 VITALS — WEIGHT: 258.94 LBS | BODY MASS INDEX: 44.45 KG/M2

## 2025-03-25 DIAGNOSIS — J31.0 CHRONIC RHINITIS: Chronic | ICD-10-CM

## 2025-03-25 DIAGNOSIS — J34.2 DEVIATED NASAL SEPTUM: ICD-10-CM

## 2025-03-25 DIAGNOSIS — J02.9 SORE THROAT: Primary | ICD-10-CM

## 2025-03-25 DIAGNOSIS — J06.9 UPPER RESPIRATORY TRACT INFECTION, UNSPECIFIED TYPE: ICD-10-CM

## 2025-03-25 LAB
CTP QC/QA: YES
CTP QC/QA: YES
MOLECULAR STREP A: NEGATIVE
POC MOLECULAR INFLUENZA A AGN: NEGATIVE
POC MOLECULAR INFLUENZA B AGN: NEGATIVE

## 2025-03-25 PROCEDURE — 1160F RVW MEDS BY RX/DR IN RCRD: CPT | Mod: CPTII,S$GLB,, | Performed by: OTOLARYNGOLOGY

## 2025-03-25 PROCEDURE — 3008F BODY MASS INDEX DOCD: CPT | Mod: CPTII,S$GLB,, | Performed by: OTOLARYNGOLOGY

## 2025-03-25 PROCEDURE — 99214 OFFICE O/P EST MOD 30 MIN: CPT | Mod: S$GLB,,, | Performed by: OTOLARYNGOLOGY

## 2025-03-25 PROCEDURE — 87502 INFLUENZA DNA AMP PROBE: CPT | Mod: QW,S$GLB,, | Performed by: OTOLARYNGOLOGY

## 2025-03-25 PROCEDURE — 1159F MED LIST DOCD IN RCRD: CPT | Mod: CPTII,S$GLB,, | Performed by: OTOLARYNGOLOGY

## 2025-03-25 PROCEDURE — 99999 PR PBB SHADOW E&M-EST. PATIENT-LVL III: CPT | Mod: PBBFAC,,, | Performed by: OTOLARYNGOLOGY

## 2025-03-25 PROCEDURE — 87651 STREP A DNA AMP PROBE: CPT | Mod: QW,S$GLB,, | Performed by: OTOLARYNGOLOGY

## 2025-03-25 RX ORDER — BENZONATATE 200 MG/1
200 CAPSULE ORAL 3 TIMES DAILY PRN
Qty: 30 CAPSULE | Refills: 1 | Status: SHIPPED | OUTPATIENT
Start: 2025-03-25 | End: 2025-04-14

## 2025-03-25 RX ORDER — PREDNISONE 10 MG/1
10 TABLET ORAL DAILY
Qty: 10 TABLET | Refills: 0 | Status: SHIPPED | OUTPATIENT
Start: 2025-03-25

## 2025-03-25 NOTE — PROGRESS NOTES
Chief Complaint   Patient presents with    Sinus Problem     Constant Sinus congestion /sore throat pains ( since Sunday night )         HPI: Basilia Delgado is a 43 y.o. female who has been referred by Damaris Navarrete NP for a few months history of throat pain on the left side.  She notes the pain is intermittent, and it only occurs with lying on the left side and with neck movements, as well as occasionally with swallowing. Her voice is not progressively worsening over this time. There are not pitch breaks or cracks. There is not vocal fatigue. She denies otalgia.  She has occasional globus and dysphagia, but not chronic.  There is no hemoptysis or hematemesis. She is breathing well.     She denies throat clearing and cough. She has heartburn and reflux.    She does have bruxism and jaw pain that has worsened over the last few months due to stress.      She also has chronic congestion and PND.  She does not use any nasal sprays or allergy medications daily.    She reports snoring nightly, but she is unaware of apneas or restless sleep.            Interval HPI 6/20/2024:       Patient reports that the neck and throat pain she was previously experiencing has improved.  She has had significant reduction in her throat symptoms with use of LPR medications and has continued to take those.  She is continued to use nasal sprays for her rhinitis symptoms and does feel that these help.    She does feel some mild symptoms of decreased air flow in the right nasal cavity, but not significantly worsening  since last visit.    Her TMJ tenderness has not been an issue significantly since last visit.    Interval HPI 9/19/2024 :      Patient presents today with acute exacerbation of her previously mentioned neck pain.  Started a few days ago.  She reports pain all along the SCM muscle and in the midportion of the neck.  She does not report any other new head and neck related symptoms.  She has not been sick lately.  She has  not started any new medications.  She has not done any new exercising.  No reason for muscle strain in that area that she can think of.  Patient does not report any history of bruxism or recent dental issues.    Interval HPI 10/31/2024 :      Overall better since last visit.   She saw Rheum after the last episode of carotidynia. They did workup and did not show vasculitis.   She feels well today and has not had any worsening of the neck pain and her nasal symptoms are under good control with the AR medications.       CT SOFT TISSUE NECK W WO CONTRAST- reviewed and discussed findings with patient today.     CLINICAL HISTORY:  neck pain, carotidynia;  Carotid sinus syncope     TECHNIQUE:  Postcontrast CT imaging of the neck was performed utilizing 75 mL omni 350 intravenous contrast.  Sagittal and coronal reformats were created.     COMPARISON:  None     FINDINGS:  In light of the history, there is mild infiltration of the soft tissues surrounding the left carotid artery most advanced at the carotid bifurcation.  No evidence of significant narrowing of the carotid lumen is identified.     The salivary and thyroid glands are unremarkable.     No soft tissue mass or adenopathy is otherwise identified in the neck.     The visualized sinuses and mastoid air cells are clear other than for minimal polypoid mucosal thickening at the floor the maxillary sinuses.  No osseous destructive process.     Impression:     Infiltration of the soft tissue and fascial planes around the left carotid artery most prominent the carotid bifurcation.  No evidence of significant luminal irregularity.  No although an underlying vasculitis is not excluded, carotidynia/TIPIC syndrome to be considered particularly in light of the history.  Follow-up as clinically warranted.       Interval HPI 03/25/2025 :      Patient has not had any recent issues with her carotidynia or neck pain.    Over the last few days she has noticed sore throat, ear pain  when she swallows, cough.  She does not note any fever, significant mucus production, significant nasal symptom.  She tested herself at home for COVID which was negative.        Past Medical History:   Diagnosis Date    Asthma     Endometriosis of uterus 1994    Fibroid 2000     Social History     Socioeconomic History    Marital status:    Occupational History    Occupation: works for councilman     Employer: [employer]   Tobacco Use    Smoking status: Never   Substance and Sexual Activity    Alcohol use: Never    Drug use: Never    Sexual activity: Yes     Partners: Male     Birth control/protection: Partner-Vasectomy     Social Drivers of Health     Financial Resource Strain: Low Risk  (9/27/2024)    Overall Financial Resource Strain (CARDIA)     Difficulty of Paying Living Expenses: Not hard at all   Food Insecurity: No Food Insecurity (9/27/2024)    Hunger Vital Sign     Worried About Running Out of Food in the Last Year: Never true     Ran Out of Food in the Last Year: Never true   Physical Activity: Inactive (9/27/2024)    Exercise Vital Sign     Days of Exercise per Week: 0 days     Minutes of Exercise per Session: 0 min   Stress: Stress Concern Present (9/27/2024)    [place] [employer] - Occupational Stress Questionnaire     Feeling of Stress : To some extent   Housing Stability: Unknown (9/27/2024)    Housing Stability Vital Sign     Unable to Pay for Housing in the Last Year: No     Past Surgical History:   Procedure Laterality Date    ADENOIDECTOMY  1986    APPENDECTOMY  1995    CHOLECYSTECTOMY  2008    COLONOSCOPY, SCREENING, LOW RISK PATIENT N/A 11/13/2024    Procedure: COLONOSCOPY, SCREENING, LOW RISK PATIENT;  Surgeon: Gordon Starkey MD;  Location: [facility] ENDOSCOPY;  Service: Endoscopy;  Laterality: N/A;  11/9 ref by Vicki Mosley MD, Sutab, portal-st    ENDOMETRIAL ABLATION N/A 05/03/2023    Procedure: ABLATION, ENDOMETRIUM;  Surgeon: Vicki Mosley MD;   Location: [facility] OR;  Service: OB/GYN;  Laterality: N/A;    LAPAROSCOPY      x 5    LAPAROTOMY      x 1    PELVIC LAPAROSCOPY  1994, 1994, 1995, 1995, 1996, 1996    TONSILLECTOMY  1986     Family History   Problem Relation Name Age of Onset    Asthma Mother Mother     Thyroid cancer Mother Mother     Cancer Mother Mother     Alcohol abuse Father Father     Drug abuse Brother Brother     Lung cancer Maternal Grandmother      Prostate cancer Maternal Grandfather      Kidney cancer Paternal Grandmother      Alcohol abuse Paternal Grandfather Grandfather     Skin cancer Paternal Grandfather Grandfather            Review of Systems  General: negative for chills, fever or weight loss  Psychological: negative for mood changes or depression  Ophthalmic: negative for blurry vision, photophobia or eye pain  ENT: see HPI  Respiratory: no cough, shortness of breath, or wheezing  Cardiovascular: no chest pain or dyspnea on exertion  Gastrointestinal: no abdominal pain, change in bowel habits, or black/ bloody stools  Musculoskeletal: negative for gait disturbance or muscular weakness  Neurological: no syncope or seizures; no ataxia  Dermatological: negative for pruritis,  rash and jaundice  Hematologic/lymphatic: no easy bruising, no new adenopathy      Physical Exam:    There were no vitals filed for this visit.            Constitutional:   She is oriented to person, place, and time. Vital signs are normal. She appears well-developed and well-nourished. She appears alert. She is cooperative.  Non-toxic appearance. Normal speech.      Head:  Normocephalic and atraumatic. Head is with TMJ tenderness (mild bilateral TMJs). Salivary glands normal.  Facial strength is normal.      Ears:  Hearing normal to normal and whispered voice; external ear normal without scars, lesions, or masses; ear canal, tympanic membrane, and middle ear normal., right ear hearing normal to normal and whispered voice; external ear normal without scars,  lesions, or masses; ear canal, tympanic membrane, and middle ear normal. and left ear hearing normal to normal and whispered voice; external ear normal without scars, lesions, or masses; ear canal, tympanic membrane, and middle ear normal..   Right Ear: Tympanic membrane is not perforated, not erythematous and not retracted. No middle ear effusion.   Left Ear: Tympanic membrane is not perforated, not erythematous and not retracted.  No middle ear effusion.     Nose:  Mucosal edema present. No rhinorrhea, septal deviation, nasal septal hematoma or polyps (Polypoid appearance of anterior surface of right middle turbinate, improved). No epistaxis. Turbinates abnormal (Polypoid appearance of anterior surface of right middle turbinate, improved) and turbinate hypertrophy (3+, congested, boggy).  No turbinate masses.  Right sinus exhibits no maxillary sinus tenderness and no frontal sinus tenderness. Left sinus exhibits no maxillary sinus tenderness and no frontal sinus tenderness.     Mouth/Throat  Oropharynx clear and moist without lesions or asymmetry, normal uvula midline, lips, teeth, and gums normal and oropharynx normal. Normal dentition. No uvula swelling or oral lesions. No oropharyngeal exudate, posterior oropharyngeal edema or posterior oropharyngeal erythema. Mirror exam not performed due to patient tolerance.  Mirror exam not performed due to patient tolerance.      Neck:  Neck normal without thyromegaly masses, asymmetry, normal tracheal structure, crepitus, and tenderness, thyroid normal, trachea normal, full range of motion with neck supple and no adenopathy. No JVD present. Carotid bruit is not present. Thyroid tenderness is present. No edema and no erythema present. No thyroid mass and no thyromegaly present.     She has no cervical adenopathy.     Cardiovascular:    Normal rate, regular rhythm, normal heart sounds and rate and rhythm, heart sounds, and pulses normal.              Pulmonary/Chest:    Effort and breath sounds normal.     Psychiatric:   She has a normal mood and affect. Her speech is normal and behavior is normal.     Neurological:   She is alert and oriented to person, place, and time. She has neurological normal, alert and oriented. No cranial nerve deficit.     Skin:   No abrasions, lacerations, lesions, or rashes.         POCT Flu and Strep- negative        Assessment:    ICD-10-CM ICD-9-CM    1. Sore throat  J02.9 462 POCT Strep A, Molecular      POCT Influenza A/B Molecular      predniSONE (DELTASONE) 10 MG tablet      benzonatate (TESSALON) 200 MG capsule      2. Upper respiratory tract infection, unspecified type  J06.9 465.9 predniSONE (DELTASONE) 10 MG tablet      benzonatate (TESSALON) 200 MG capsule      3. Chronic rhinitis  J31.0 472.0       4. Deviated nasal septum  J34.2 470                 The primary encounter diagnosis was Sore throat. Diagnoses of Upper respiratory tract infection, unspecified type, Chronic rhinitis, and Deviated nasal septum were also pertinent to this visit.      Plan:      Prescribed short steroid taper and Tessalon Perles for patient to use  P.r.n..    She will continue her daily allergy medications.      I discussed with her that if symptoms worsen or she has other signs of ongoing infection that are worsening she will let me know and I can prescribe any additional medications that may be needed.      Anahy Ferreira MD

## 2025-03-25 NOTE — PATIENT INSTRUCTIONS
Continue flonase and zyrtec.   Steroid taper and tessalon perles for cough if needed.    Continue OTC medications for cough/congestion.

## 2025-04-08 ENCOUNTER — LAB VISIT (OUTPATIENT)
Dept: LAB | Facility: HOSPITAL | Age: 45
End: 2025-04-08
Payer: COMMERCIAL

## 2025-04-08 DIAGNOSIS — E11.9 TYPE 2 DIABETES MELLITUS WITHOUT COMPLICATION, WITHOUT LONG-TERM CURRENT USE OF INSULIN: ICD-10-CM

## 2025-04-08 LAB
ALBUMIN/CREAT UR: 2.9 UG/MG
CREAT UR-MCNC: 209 MG/DL (ref 15–325)
EAG (OHS): 126 MG/DL (ref 68–131)
HBA1C MFR BLD: 6 % (ref 4–5.6)
MICROALBUMIN UR-MCNC: 6 UG/ML (ref ?–5000)

## 2025-04-08 PROCEDURE — 36415 COLL VENOUS BLD VENIPUNCTURE: CPT

## 2025-04-08 PROCEDURE — 82043 UR ALBUMIN QUANTITATIVE: CPT

## 2025-04-08 PROCEDURE — 83036 HEMOGLOBIN GLYCOSYLATED A1C: CPT

## 2025-04-09 ENCOUNTER — RESULTS FOLLOW-UP (OUTPATIENT)
Dept: PRIMARY CARE CLINIC | Facility: CLINIC | Age: 45
End: 2025-04-09

## 2025-05-12 RX ORDER — FLUTICASONE PROPIONATE 50 MCG
SPRAY, SUSPENSION (ML) NASAL
Qty: 48 ML | Refills: 3 | Status: SHIPPED | OUTPATIENT
Start: 2025-05-12

## 2025-07-01 ENCOUNTER — LAB VISIT (OUTPATIENT)
Dept: LAB | Facility: HOSPITAL | Age: 45
End: 2025-07-01
Attending: INTERNAL MEDICINE
Payer: COMMERCIAL

## 2025-07-01 ENCOUNTER — OFFICE VISIT (OUTPATIENT)
Dept: PRIMARY CARE CLINIC | Facility: CLINIC | Age: 45
End: 2025-07-01
Payer: COMMERCIAL

## 2025-07-01 VITALS
WEIGHT: 251.31 LBS | HEART RATE: 85 BPM | HEIGHT: 64 IN | DIASTOLIC BLOOD PRESSURE: 78 MMHG | SYSTOLIC BLOOD PRESSURE: 122 MMHG | BODY MASS INDEX: 42.9 KG/M2 | OXYGEN SATURATION: 97 %

## 2025-07-01 DIAGNOSIS — F41.9 ANXIETY: ICD-10-CM

## 2025-07-01 DIAGNOSIS — E66.01 CLASS 3 SEVERE OBESITY DUE TO EXCESS CALORIES WITHOUT SERIOUS COMORBIDITY WITH BODY MASS INDEX (BMI) OF 40.0 TO 44.9 IN ADULT: ICD-10-CM

## 2025-07-01 DIAGNOSIS — E66.813 CLASS 3 SEVERE OBESITY DUE TO EXCESS CALORIES WITHOUT SERIOUS COMORBIDITY WITH BODY MASS INDEX (BMI) OF 40.0 TO 44.9 IN ADULT: ICD-10-CM

## 2025-07-01 DIAGNOSIS — Z12.31 ENCOUNTER FOR SCREENING MAMMOGRAM FOR MALIGNANT NEOPLASM OF BREAST: ICD-10-CM

## 2025-07-01 DIAGNOSIS — R61 NIGHT SWEATS: ICD-10-CM

## 2025-07-01 DIAGNOSIS — E11.9 TYPE 2 DIABETES MELLITUS WITHOUT COMPLICATION, WITHOUT LONG-TERM CURRENT USE OF INSULIN: Primary | ICD-10-CM

## 2025-07-01 DIAGNOSIS — Z00.00 ANNUAL PHYSICAL EXAM: ICD-10-CM

## 2025-07-01 LAB
ESTRADIOL SERPL HS-MCNC: 39 PG/ML
FSH SERPL-ACNC: 15.13 MIU/ML
LH SERPL-ACNC: 3.9 MIU/ML
PROGEST SERPL-MCNC: 0.2 NG/ML

## 2025-07-01 PROCEDURE — 36415 COLL VENOUS BLD VENIPUNCTURE: CPT

## 2025-07-01 PROCEDURE — 83001 ASSAY OF GONADOTROPIN (FSH): CPT

## 2025-07-01 PROCEDURE — 84144 ASSAY OF PROGESTERONE: CPT

## 2025-07-01 PROCEDURE — 3008F BODY MASS INDEX DOCD: CPT | Mod: CPTII,S$GLB,, | Performed by: INTERNAL MEDICINE

## 2025-07-01 PROCEDURE — 99999 PR PBB SHADOW E&M-EST. PATIENT-LVL IV: CPT | Mod: PBBFAC,,, | Performed by: INTERNAL MEDICINE

## 2025-07-01 PROCEDURE — 3074F SYST BP LT 130 MM HG: CPT | Mod: CPTII,S$GLB,, | Performed by: INTERNAL MEDICINE

## 2025-07-01 PROCEDURE — 82670 ASSAY OF TOTAL ESTRADIOL: CPT

## 2025-07-01 PROCEDURE — 3044F HG A1C LEVEL LT 7.0%: CPT | Mod: CPTII,S$GLB,, | Performed by: INTERNAL MEDICINE

## 2025-07-01 PROCEDURE — 1159F MED LIST DOCD IN RCRD: CPT | Mod: CPTII,S$GLB,, | Performed by: INTERNAL MEDICINE

## 2025-07-01 PROCEDURE — 3078F DIAST BP <80 MM HG: CPT | Mod: CPTII,S$GLB,, | Performed by: INTERNAL MEDICINE

## 2025-07-01 PROCEDURE — 83002 ASSAY OF GONADOTROPIN (LH): CPT

## 2025-07-01 PROCEDURE — 99214 OFFICE O/P EST MOD 30 MIN: CPT | Mod: S$GLB,,, | Performed by: INTERNAL MEDICINE

## 2025-07-01 RX ORDER — TIRZEPATIDE 5 MG/.5ML
5 INJECTION, SOLUTION SUBCUTANEOUS
Qty: 6 ML | Refills: 0 | Status: SHIPPED | OUTPATIENT
Start: 2025-07-01

## 2025-07-01 RX ORDER — OMEPRAZOLE 20 MG/1
20 CAPSULE, DELAYED RELEASE ORAL EVERY MORNING
Qty: 30 CAPSULE | Refills: 11 | Status: SHIPPED | OUTPATIENT
Start: 2025-07-01 | End: 2026-07-01

## 2025-07-01 NOTE — PROGRESS NOTES
[facility] Note    Chief Complaint      Chief Complaint   Patient presents with    Follow-up     History of Present Illness      Basilia Delgado is a 44 y.o. female who presents today for prediabetes.  Patient comes to appointment alone.  GYN: [place]        Problem List Items Addressed This Visit       Class 3 severe obesity due to excess calories without serious comorbidity with body mass index (BMI) of 40.0 to 44.9 in adult    Current Assessment & Plan   Lost 20-25 pounds since last visit, doing mounjaro, has been eating earlier. Has not been exercising.         Type 2 diabetes mellitus without complication, without long-term current use of insulin - Primary    Current Assessment & Plan   A1C 6 in 4/2025. On mounjaro 2.5 mg.         Relevant Medications    tirzepatide (MOUNJARO) 5 mg/0.5 mL PnIj    Other Relevant Orders    Hemoglobin A1C    Microalbumin/Creatinine Ratio, Urine     Other Visit Diagnoses         Annual physical exam        Relevant Orders    Comprehensive Metabolic Panel    Lipid Panel    CBC Auto Differential      Night sweats        Relevant Orders    Luteinizing Hormone    Follicle Stimulating Hormone    Estradiol    Progesterone      Encounter for screening mammogram for malignant neoplasm of breast        Relevant Orders    Mammo Digital Screening Bilat w/ Adan (XPD)                Health Maintenance   Topic Date Due    COVID-19 Vaccine (5 - 2024-25 season) 09/01/2024    Influenza Vaccine (1) 09/01/2025    Mammogram  11/09/2025    Hemoglobin A1c (Prediabetes)  04/08/2026    Cervical Cancer Screening  10/20/2027    Colorectal Cancer Screening  11/13/2029    Lipid Panel  12/16/2029    TETANUS VACCINE  11/06/2030    RSV Vaccine (Age 60+ and Pregnant patients) (1 - 1-dose 75+ series) 08/25/2054    Hepatitis C Screening  Completed    HIV Screening  Completed    Pneumococcal Vaccines (Age 0-49)  Aged Out       Past Medical History:   Diagnosis Date    Asthma     Endometriosis of  uterus 1994    Fibroid 2000    GERD (gastroesophageal reflux disease)     Obesity     Seasonal allergies        Past Surgical History:   Procedure Laterality Date    ADENOIDECTOMY  1986    APPENDECTOMY  1995    CHOLECYSTECTOMY  2008    COLONOSCOPY, SCREENING, LOW RISK PATIENT N/A 11/13/2024    Procedure: COLONOSCOPY, SCREENING, LOW RISK PATIENT;  Surgeon: Gordon Starkey MD;  Location: [facility] ENDOSCOPY;  Service: Endoscopy;  Laterality: N/A;  11/9 ref by Vicki Mosley MD, Sutab, portal-st    ENDOMETRIAL ABLATION N/A 05/03/2023    Procedure: ABLATION, ENDOMETRIUM;  Surgeon: Vicki Mosley MD;  Location: [facility] OR;  Service: OB/GYN;  Laterality: N/A;    LAPAROSCOPY      x 5    LAPAROTOMY      x 1    PELVIC LAPAROSCOPY  1994, 1994, 1995, 1995, 1996, 1996    TONSILLECTOMY  1986    TYMPANOSTOMY TUBE PLACEMENT         family history includes Alcohol abuse in her father and paternal grandfather; Asthma in her mother; Cancer in her mother; Drug abuse in her brother; Kidney cancer in her paternal grandmother; Lung cancer in her maternal grandmother; Prostate cancer in her maternal grandfather; Skin cancer in her paternal grandfather; Thyroid cancer in her mother.    Social History     Tobacco Use    Smoking status: Never   Substance Use Topics    Alcohol use: Never    Drug use: Never       Review of Systems   Constitutional:  Negative for chills and fever.   HENT:  Negative for hearing loss.    Eyes:  Negative for discharge.   Respiratory:  Negative for cough, shortness of breath and wheezing.    Cardiovascular:  Negative for chest pain and palpitations.   Gastrointestinal:  Negative for blood in stool, constipation, diarrhea, nausea and vomiting.   Genitourinary:  Negative for dysuria and hematuria.   Musculoskeletal:  Negative for falls and neck pain.   Neurological:  Negative for weakness and headaches.   Endo/Heme/Allergies:  Negative for polydipsia.        Outpatient Encounter Medications as of 7/1/2025    Medication Sig Dispense Refill    albuterol (PROVENTIL/VENTOLIN HFA) 90 mcg/actuation inhaler INHALE 1-2 PUFFS BY MOUTH INTO THE LUNGS EVERY 6 HOURS AS NEEDED FOR WHEEZING. (RESCUE) 16 g 1    ALPRAZolam (XANAX) 0.25 MG tablet Take 1 tablet (0.25 mg total) by mouth 2 (two) times daily as needed for Anxiety (use for CT). 10 tablet 0    cetirizine (ZYRTEC) 10 MG tablet Take 1 tablet (10 mg total) by mouth once daily. 30 tablet 12    cyclobenzaprine (FLEXERIL) 10 MG tablet Take 1 tablet (10 mg total) by mouth 3 (three) times daily as needed for Muscle spasms. 60 tablet 1    EScitalopram oxalate (LEXAPRO) 20 MG tablet Take 1 tablet (20 mg total) by mouth once daily. 90 tablet 3    fluticasone propionate (FLONASE) 50 mcg/actuation nasal spray SPRAY 1 SPRAY (50 MCG TOTAL) INTO EACH NOSTRIL TWICE A DAY 48 mL 3    gabapentin (NEURONTIN) 300 MG capsule Take 1 capsule (300 mg total) by mouth 3 (three) times daily. 60 capsule 1    meloxicam (MOBIC) 15 MG tablet TAKE 1 TABLET BY MOUTH EVERY DAY 30 tablet 3    [DISCONTINUED] MOUNJARO 2.5 mg/0.5 mL PnIj INJECT 2.5 MG INTO THE SKIN EVERY 7 DAYS 12 Pen 1    [DISCONTINUED] omeprazole (PRILOSEC) 20 MG capsule Take 1 capsule (20 mg total) by mouth every morning. 30 capsule 11    omeprazole (PRILOSEC) 20 MG capsule Take 1 capsule (20 mg total) by mouth every morning. 30 capsule 11    tirzepatide (MOUNJARO) 5 mg/0.5 mL PnIj Inject 5 mg into the skin every 7 days. 6 mL 0    [DISCONTINUED] famotidine (PEPCID) 40 MG tablet Take 0.5 tablets (20 mg total) by mouth nightly. for 365 doses 15 tablet 11    [DISCONTINUED] predniSONE (DELTASONE) 10 MG tablet Take 1 tablet (10 mg total) by mouth once daily. Take 4 pills PO day 1, then 3 pills PO day 2, then 2 pills PO day 3, then 1 pill PO day 4 10 tablet 0     No facility-administered encounter medications on file as of 7/1/2025.        Review of patient's allergies indicates:   Allergen Reactions    Cephalosporins Anaphylaxis, Other (See  "Comments) and Shortness Of Breath     Other Reaction(s): respiratory distress    Penicillins Rash       Physical Exam      Vital Signs  Pulse: 85  SpO2: 97 %  BP: 122/78  Pain Score: 0-No pain  Height and Weight  Height: 5' 4" (162.6 cm)  Weight: 114 kg (251 lb 5.2 oz)  BSA (Calculated - sq m): 2.27 sq meters  BMI (Calculated): 43.1  Weight in (lb) to have BMI = 25: 145.3]    Physical Exam  Constitutional:       Appearance: She is well-developed.   HENT:      Head: Normocephalic and atraumatic.   Cardiovascular:      Rate and Rhythm: Normal rate and regular rhythm.      Heart sounds: Normal heart sounds. No murmur heard.  Pulmonary:      Effort: Pulmonary effort is normal. No respiratory distress.      Breath sounds: Normal breath sounds.   Abdominal:      General: There is no distension.      Palpations: Abdomen is soft.      Tenderness: There is no abdominal tenderness. There is no guarding.   Skin:     General: Skin is warm and dry.   Neurological:      Mental Status: She is alert. Mental status is at baseline.   Psychiatric:         Behavior: Behavior normal.          Laboratory:  CBC:  No results for input(s): "WBC", "RBC", "HGB", "HCT", "PLT", "MCV", "MCH", "MCHC" in the last 2160 hours.    CMP:  No results for input(s): "GLU", "CALCIUM", "ALBUMIN", "PROT", "NA", "K", "CO2", "CL", "BUN", "ALKPHOS", "ALT", "AST", "BILITOT" in the last 2160 hours.    Invalid input(s): "CREATININ"    URINALYSIS:  No results for input(s): "COLORU", "CLARITYU", "SPECGRAV", "PHUR", "PROTEINUA", "GLUCOSEU", "BILIRUBINCON", "BLOODU", "WBCU", "RBCU", "BACTERIA", "MUCUS", "NITRITE", "LEUKOCYTESUR", "UROBILINOGEN", "HYALINECASTS" in the last 2160 hours.     LIPIDS:  No results for input(s): "TSH", "HDL", "CHOL", "TRIG", "LDLCALC", "CHOLHDL", "NONHDLCHOL", "TOTALCHOLEST" in the last 2160 hours.  TSH:  No results for input(s): "TSH" in the last 2160 hours.  A1C:  Recent Labs   Lab Result Units 04/08/25  0849   Hemoglobin A1c % 6.0* "       Radiology:  No results found in the last 30 days.     Assessment/Plan     Basilia Delgado is a 44 y.o.female with:    1. Type 2 diabetes mellitus without complication, without long-term current use of insulin  - tirzepatide (MOUNJARO) 5 mg/0.5 mL PnIj; Inject 5 mg into the skin every 7 days.  Dispense: 6 mL; Refill: 0  - Hemoglobin A1C; Future  - Microalbumin/Creatinine Ratio, Urine; Future    2. Class 3 severe obesity due to excess calories without serious comorbidity with body mass index (BMI) of 40.0 to 44.9 in adult    3. Annual physical exam  - Comprehensive Metabolic Panel; Future  - Lipid Panel; Future  - CBC Auto Differential; Future    4. Night sweats  - Luteinizing Hormone; Future  - Follicle Stimulating Hormone; Future  - Estradiol; Future  - Progesterone; Future    5. Encounter for screening mammogram for malignant neoplasm of breast  - Mammo Digital Screening Bilat w/ Adan (XPD); Future      -  -Continue current medications and maintain follow up with specialists.    -No follow-ups on file.       Leeann Green MD  [facility]  Answers submitted by the patient for this visit:  Review of Systems Questionnaire (Submitted on 6/24/2025)  activity change: No  unexpected weight change: No  rhinorrhea: No  trouble swallowing: No  visual disturbance: No  chest tightness: No  polyuria: No  difficulty urinating: No  menstrual problem: No  joint swelling: No  arthralgias: No  confusion: No  dysphoric mood: No

## 2025-07-01 NOTE — ASSESSMENT & PLAN NOTE
Stable on lexapro 20 mg.    From previous visit:  Feels very anxious all the time.  Worries about things that she shouldn't. Gets anxiety in car wash.

## 2025-07-01 NOTE — ASSESSMENT & PLAN NOTE
Lost 20-25 pounds since last visit, doing mounjaro, has been eating earlier. Has not been exercising.

## 2025-07-02 ENCOUNTER — RESULTS FOLLOW-UP (OUTPATIENT)
Dept: PRIMARY CARE CLINIC | Facility: CLINIC | Age: 45
End: 2025-07-02

## (undated) DEVICE — SOL POVIDONE PREP IODINE 4 OZ

## (undated) DEVICE — SOL NACL IRR 3000ML

## (undated) DEVICE — PACK FLUENT DISPOSABLE

## (undated) DEVICE — VIDEO RENTAL SERVICE

## (undated) DEVICE — Device

## (undated) DEVICE — SET BASIN 48X48IN 6000ML RING

## (undated) DEVICE — SOL IRR SOD CHL .9% POUR

## (undated) DEVICE — GLOVE BIOGEL SKINSENSE PI 6.5

## (undated) DEVICE — SOL POVIDONE SCRUB IODINE 4 OZ

## (undated) DEVICE — DEVICE ABLATION NOVASURE DISP